# Patient Record
Sex: FEMALE | Race: WHITE | Employment: OTHER | URBAN - METROPOLITAN AREA
[De-identification: names, ages, dates, MRNs, and addresses within clinical notes are randomized per-mention and may not be internally consistent; named-entity substitution may affect disease eponyms.]

---

## 2017-04-27 ENCOUNTER — HOSPICE ADMISSION (OUTPATIENT)
Dept: HOSPICE | Facility: HOSPICE | Age: 82
End: 2017-04-27

## 2017-05-09 PROBLEM — E03.9 HYPOTHYROIDISM: Status: ACTIVE | Noted: 2017-05-09

## 2017-05-09 PROBLEM — F03.90 DEMENTIA WITHOUT BEHAVIORAL DISTURBANCE (HCC): Status: ACTIVE | Noted: 2017-05-09

## 2017-05-09 PROBLEM — G47.34 NOCTURNAL HYPOXIA: Status: ACTIVE | Noted: 2017-05-09

## 2017-05-09 PROBLEM — J44.9 COPD (CHRONIC OBSTRUCTIVE PULMONARY DISEASE) (HCC): Status: ACTIVE | Noted: 2017-05-09

## 2017-05-09 PROBLEM — F32.A DEPRESSION: Status: ACTIVE | Noted: 2017-05-09

## 2017-06-21 ENCOUNTER — HOSPICE ADMISSION (OUTPATIENT)
Dept: HOSPICE | Facility: HOSPICE | Age: 82
End: 2017-06-21
Payer: OTHER MISCELLANEOUS

## 2017-06-21 ENCOUNTER — HOSPICE ADMISSION (OUTPATIENT)
Dept: HOSPICE | Facility: HOSPICE | Age: 82
End: 2017-06-21

## 2017-06-29 ENCOUNTER — HOSPITAL ENCOUNTER (INPATIENT)
Age: 82
LOS: 5 days | Discharge: HOME | End: 2017-07-04
Attending: INTERNAL MEDICINE | Admitting: INTERNAL MEDICINE

## 2017-06-29 PROCEDURE — 3336500001 HSPC ELECTION

## 2017-06-29 PROCEDURE — 0655 HSPC INPATIENT RESPITE

## 2017-06-29 RX ORDER — TAMSULOSIN HYDROCHLORIDE 0.4 MG/1
0.4 CAPSULE ORAL DAILY
Status: DISCONTINUED | OUTPATIENT
Start: 2017-06-30 | End: 2017-07-04 | Stop reason: HOSPADM

## 2017-06-29 RX ORDER — DIPHENHYDRAMINE HCL 25 MG
25 CAPSULE ORAL
Status: DISCONTINUED | OUTPATIENT
Start: 2017-06-29 | End: 2017-06-29

## 2017-06-29 RX ORDER — CICLOPIROX OLAMINE 7.7 MG/G
1 CREAM TOPICAL 2 TIMES DAILY
Status: DISCONTINUED | OUTPATIENT
Start: 2017-06-29 | End: 2017-07-04 | Stop reason: HOSPADM

## 2017-06-29 RX ORDER — ACETAMINOPHEN 500 MG
500 TABLET ORAL
Status: ON HOLD | COMMUNITY
End: 2017-06-29

## 2017-06-29 RX ORDER — CICLOPIROX OLAMINE 7.7 MG/G
1 CREAM TOPICAL 2 TIMES DAILY
COMMUNITY

## 2017-06-29 RX ORDER — DOCUSATE SODIUM 100 MG/1
100 CAPSULE, LIQUID FILLED ORAL
Status: DISCONTINUED | OUTPATIENT
Start: 2017-06-29 | End: 2017-06-29

## 2017-06-29 RX ORDER — ACETAMINOPHEN 500 MG
500 TABLET ORAL
Status: DISCONTINUED | OUTPATIENT
Start: 2017-06-29 | End: 2017-06-29

## 2017-06-29 RX ORDER — FLUTICASONE PROPIONATE AND SALMETEROL 250; 50 UG/1; UG/1
1 POWDER RESPIRATORY (INHALATION) EVERY 12 HOURS
Status: DISCONTINUED | OUTPATIENT
Start: 2017-06-29 | End: 2017-07-04 | Stop reason: HOSPADM

## 2017-06-29 RX ORDER — ALBUTEROL SULFATE 90 UG/1
2 AEROSOL, METERED RESPIRATORY (INHALATION)
Status: DISCONTINUED | OUTPATIENT
Start: 2017-06-29 | End: 2017-07-04 | Stop reason: HOSPADM

## 2017-06-29 RX ORDER — DIPHENHYDRAMINE HCL 25 MG
25 CAPSULE ORAL
Status: DISCONTINUED | OUTPATIENT
Start: 2017-06-29 | End: 2017-07-04 | Stop reason: HOSPADM

## 2017-06-29 RX ORDER — DULOXETIN HYDROCHLORIDE 30 MG/1
30 CAPSULE, DELAYED RELEASE ORAL DAILY
Status: DISCONTINUED | OUTPATIENT
Start: 2017-06-30 | End: 2017-07-04 | Stop reason: HOSPADM

## 2017-06-29 RX ORDER — TAMSULOSIN HYDROCHLORIDE 0.4 MG/1
0.4 CAPSULE ORAL DAILY
COMMUNITY
End: 2017-08-23

## 2017-06-29 RX ORDER — MEMANTINE HYDROCHLORIDE 10 MG/1
10 TABLET ORAL 2 TIMES DAILY
Status: DISCONTINUED | OUTPATIENT
Start: 2017-06-29 | End: 2017-07-04 | Stop reason: HOSPADM

## 2017-06-29 RX ORDER — LORAZEPAM 0.5 MG/1
0.5 TABLET ORAL
COMMUNITY
End: 2017-07-31 | Stop reason: ALTCHOICE

## 2017-06-29 RX ORDER — DIPHENHYDRAMINE HCL 25 MG
25 CAPSULE ORAL
COMMUNITY
End: 2017-07-31 | Stop reason: ALTCHOICE

## 2017-06-29 RX ORDER — LORAZEPAM 0.5 MG/1
0.5 TABLET ORAL
Status: DISCONTINUED | OUTPATIENT
Start: 2017-06-29 | End: 2017-07-04 | Stop reason: HOSPADM

## 2017-06-29 RX ORDER — LORAZEPAM 0.5 MG/1
0.5 TABLET ORAL
Status: DISCONTINUED | OUTPATIENT
Start: 2017-06-29 | End: 2017-06-29

## 2017-06-29 RX ORDER — DOCUSATE SODIUM 100 MG/1
100 CAPSULE, LIQUID FILLED ORAL
Status: ON HOLD | COMMUNITY
End: 2017-06-29

## 2017-06-29 RX ORDER — ALBUTEROL SULFATE 90 UG/1
2 AEROSOL, METERED RESPIRATORY (INHALATION)
COMMUNITY

## 2017-06-29 RX ORDER — DIPHENHYDRAMINE HCL 25 MG
25 CAPSULE ORAL
Status: ON HOLD | COMMUNITY
End: 2017-06-29

## 2017-06-29 RX ORDER — PHENOL 1.4 %
1 AEROSOL, SPRAY (ML) MUCOUS MEMBRANE DAILY
Status: DISCONTINUED | OUTPATIENT
Start: 2017-06-30 | End: 2017-07-04 | Stop reason: HOSPADM

## 2017-06-29 RX ADMIN — FLUTICASONE PROPIONATE AND SALMETEROL 1 PUFF: 250; 50 POWDER RESPIRATORY (INHALATION) at 20:41

## 2017-06-29 RX ADMIN — MEMANTINE HYDROCHLORIDE 10 MG: 10 TABLET ORAL at 20:42

## 2017-06-29 RX ADMIN — CICLOPIROX OLAMINE 1 G: 7.7 CREAM TOPICAL at 20:41

## 2017-06-29 NOTE — IP AVS SNAPSHOT
Current Discharge Medication List  
  
CONTINUE these medications which have CHANGED Dose & Instructions Dispensing Information Comments Morning Noon Evening Bedtime OTHER(NON-FORMULARY) What changed:  Another medication with the same name was removed. Continue taking this medication, and follow the directions you see here. Your last dose was: Your next dose is:    
   
   
 Dose:  1 Tab Take 1 Tab by mouth every four (4) hours as needed (nasal congestion). Zicam rapid melts 1 tablet po q4 prn nasal congestion. Contracted Patient with [facility]. Using patient supply of home med. Refills:  0 CONTINUE these medications which have NOT CHANGED Dose & Instructions Dispensing Information Comments Morning Noon Evening Bedtime ADVAIR DISKUS 250-50 mcg/dose diskus inhaler Generic drug:  fluticasone-salmeterol Your last dose was: Your next dose is:    
   
   
 Dose:  1 Puff Take 1 Puff by inhalation every twelve (12) hours. Refills:  0  
     
   
   
   
  
 ATIVAN 0.5 mg tablet Generic drug:  LORazepam  
   
Your last dose was: Your next dose is:    
   
   
 Dose:  0.5 mg Take 0.5 mg by mouth every six (6) hours as needed for Anxiety. Refills:  0  
     
   
   
   
  
 BENADRYL 25 mg capsule Generic drug:  diphenhydrAMINE Your last dose was: Your next dose is:    
   
   
 Dose:  25 mg Take 25 mg by mouth nightly as needed for Sleep. Refills:  0  
     
   
   
   
  
 CHLORASEPTIC SORE THROAT 6-10 mg Lozg Generic drug:  Benzocaine-Menthol Your last dose was: Your next dose is:    
   
   
 Dose:  1-2 Lozenge 1-2 Lozenges by Mucous Membrane route every four (4) hours as needed for Other (sore throat). Contracted Patient with [facility]. Using patient supply of home med. Refills:  0 ciclopirox 0.77 % topical cream  
Commonly known as:  Radha Colder Your last dose was: Your next dose is:    
   
   
 Dose:  1 g Apply 1 g to affected area two (2) times a day. To affected area bid. Contracted Patient with [facility]. Using patient supply of home med. Refills:  0  
     
   
   
   
  
 CYMBALTA 30 mg capsule Generic drug:  DULoxetine Your last dose was: Your next dose is:    
   
   
 Dose:  30 mg Take 30 mg by mouth daily. Refills:  0  
     
   
   
   
  
 FLOMAX 0.4 mg capsule Generic drug:  tamsulosin Your last dose was: Your next dose is:    
   
   
 Dose:  0.4 mg Take 0.4 mg by mouth daily. Refills:  0 MEGARED OMEGA-3 KRILL OIL PO Your last dose was: Your next dose is:    
   
   
 Dose:  1 Cap Take 1 Cap by mouth daily. Refills:  0  
     
   
   
   
  
 NAMENDA 10 mg tablet Generic drug:  memantine Your last dose was: Your next dose is:    
   
   
 Dose:  10 mg Take 10 mg by mouth two (2) times a day. Refills:  0 phenol throat spray 1.4 % spray Commonly known as:  [place] Your last dose was: Your next dose is:    
   
   
 Dose:  2 Spray Take 2 Sprays by mouth every four (4) hours as needed for Sore throat. Contracted Patient with [facility]. Using patient supply of home med. Refills:  0 PROVENTIL HFA 90 mcg/actuation inhaler Generic drug:  albuterol Your last dose was: Your next dose is:    
   
   
 Dose:  2 Puff Take 2 Puffs by inhalation every four (4) hours as needed for Wheezing or Shortness of Breath. Refills:  0

## 2017-06-29 NOTE — IP AVS SNAPSHOT
Yoseph Bowman 
 
 
 [address] [facility] 
[id number] Patient: Wayne Stroud MRN: [id number] KJT:[id number] You are allergic to the following No active allergies Recent Documentation Smoking Status Former Smoker Emergency Contacts Name Discharge Info Relation Home Work Mobile Lauren Love  Daughter [21] [phone] About your hospitalization You were admitted on:  June 29, 2017 You last received care in the:  [address] [place] [address] You were discharged on:  July 4, 2017 Unit phone number:  [phone] Why you were hospitalized Your primary diagnosis was:  Copd (Chronic Obstructive Pulmonary Disease) ([facility]) Providers Seen During Your Hospitalizations Provider Role Specialty Primary office phone Leda Mcgraw MD Attending Provider Geriatric Medicine [phone] Your Primary Care Physician (PCP) Primary Care Physician Office Phone Office Fax Omid Dunncorrine [phone] Follow-up Information Follow up With Details Comments Contact Info Lane Gilford, [place]   [place] [address] 
[phone] Leda Mcgraw MD   [address] [address] 
[phone] Current Discharge Medication List  
  
CONTINUE these medications which have CHANGED Dose & Instructions Dispensing Information Comments Morning Noon Evening Bedtime OTHER(NON-FORMULARY) What changed:  Another medication with the same name was removed. Continue taking this medication, and follow the directions you see here. Your last dose was: Your next dose is:    
   
   
 Dose:  1 Tab Take 1 Tab by mouth every four (4) hours as needed (nasal congestion). Zicam rapid melts 1 tablet po q4 prn nasal congestion.  Contracted Patient with [facility]. Using patient supply of home med. Refills:  0 CONTINUE these medications which have NOT CHANGED Dose & Instructions Dispensing Information Comments Morning Noon Evening Bedtime ADVAIR DISKUS 250-50 mcg/dose diskus inhaler Generic drug:  fluticasone-salmeterol Your last dose was: Your next dose is:    
   
   
 Dose:  1 Puff Take 1 Puff by inhalation every twelve (12) hours. Refills:  0  
     
   
   
   
  
 ATIVAN 0.5 mg tablet Generic drug:  LORazepam  
   
Your last dose was: Your next dose is:    
   
   
 Dose:  0.5 mg Take 0.5 mg by mouth every six (6) hours as needed for Anxiety. Refills:  0  
     
   
   
   
  
 BENADRYL 25 mg capsule Generic drug:  diphenhydrAMINE Your last dose was: Your next dose is:    
   
   
 Dose:  25 mg Take 25 mg by mouth nightly as needed for Sleep. Refills:  0  
     
   
   
   
  
 CHLORASEPTIC SORE THROAT 6-10 mg Lozg Generic drug:  Benzocaine-Menthol Your last dose was: Your next dose is:    
   
   
 Dose:  1-2 Lozenge 1-2 Lozenges by Mucous Membrane route every four (4) hours as needed for Other (sore throat). Contracted Patient with [facility]. Using patient supply of home med. Refills:  0  
     
   
   
   
  
 ciclopirox 0.77 % topical cream  
Commonly known as:  Areli Erika Your last dose was: Your next dose is:    
   
   
 Dose:  1 g Apply 1 g to affected area two (2) times a day. To affected area bid. Contracted Patient with [facility]. Using patient supply of home med. Refills:  0  
     
   
   
   
  
 CYMBALTA 30 mg capsule Generic drug:  DULoxetine Your last dose was: Your next dose is:    
   
   
 Dose:  30 mg Take 30 mg by mouth daily. Refills:  0  
     
   
   
   
  
 FLOMAX 0.4 mg capsule Generic drug:  tamsulosin Your last dose was: Your next dose is:    
   
   
 Dose:  0.4 mg Take 0.4 mg by mouth daily. Refills:  0 MEGARED OMEGA-3 KRILL OIL PO Your last dose was: Your next dose is:    
   
   
 Dose:  1 Cap Take 1 Cap by mouth daily. Refills:  0  
     
   
   
   
  
 NAMENDA 10 mg tablet Generic drug:  memantine Your last dose was: Your next dose is:    
   
   
 Dose:  10 mg Take 10 mg by mouth two (2) times a day. Refills:  0 phenol throat spray 1.4 % spray Commonly known as:  Roxianbhargav Goodmanles Your last dose was: Your next dose is:    
   
   
 Dose:  2 Spray Take 2 Sprays by mouth every four (4) hours as needed for Sore throat. Contracted Patient with [facility]. Using patient supply of home med. Refills:  0 PROVENTIL HFA 90 mcg/actuation inhaler Generic drug:  albuterol Your last dose was: Your next dose is:    
   
   
 Dose:  2 Puff Take 2 Puffs by inhalation every four (4) hours as needed for Wheezing or Shortness of Breath. Refills:  0 Discharge Instructions SEE DC SUMMARY. Rip Robbins NP Nurse Practitioner Cosign Needed Nurse Practitioner Discharge Summaries Date of Service: 07/03/17 1526 Hide copied text 
 
  
Discharge Summary  
  
Patient: Brenda Henderson MRN: [id number]  SSN: [id number] YOB: 1933  Age: 80 y.o. Sex: female   
   
Admit Date: 6/29/2017 
  
Discharge Date: 7/4/17   
  
Admission Diagnoses: Respite COPD (chronic obstructive pulmonary disease) ([facility] [address] COPD (chronic obstructive pulmonary disease) ([facility] [address] COPD (chronic obstructive pulmonary disease) ([facility] [address] COPD (chronic obstructive pulmonary disease) [facility] 
  
Discharge Diagnoses:  
Problem List as of 7/3/2017  Date Reviewed: 7/3/2017  
          Codes Class Noted - Resolved   * (Principal)COPD (chronic obstructive pulmonary disease) ([facility] [address] ICD-10-CM: J44.9 ICD-9-CM: 054   5/9/2017 - Present  
     
  Depression ICD-10-CM: F32.9 ICD-9-CM: 758   5/9/2017 - Present  
     
  Hypothyroidism ICD-10-CM: E03.9 ICD-9-CM: 303. 9   5/9/2017 - Present  
     
  Nocturnal hypoxia ICD-10-CM: G47.34 
ICD-9-CM: 327.24   5/9/2017 - Present  
     
  Dementia without behavioral disturbance ICD-10-CM: F03.90 ICD-9-CM: 294.20   5/9/2017 - Present  
     
  
  
  
Discharge Condition: Stable 
  
Code Status:DNR 
  
Hospital Course: Discharge from respite stay at [facility] to home with [facility]. DC 7/4/17 at  via 11 AM by family car. Status at time of discharge is routine. 
  
Consults: None 
  
Significant Diagnostic Studies: None 
  
Disposition: home 
  
Discharge Medications:  
   
Current Discharge Medication List  
   
   
CONTINUE these medications which have NOT CHANGED  
  Details LORazepam (ATIVAN) 0.5 mg tablet Take 0.5 mg by mouth every six (6) hours as needed for Anxiety.  
   
albuterol (PROVENTIL HFA) 90 mcg/actuation inhaler Take 2 Puffs by inhalation every four (4) hours as needed for Wheezing or Shortness of Breath.  
   
tamsulosin (FLOMAX) 0.4 mg capsule Take 0.4 mg by mouth daily.  
   
OTHER,NON-FORMULARY, Take 1 Tab by mouth every four (4) hours as needed (nasal congestion). Zicam rapid melts 1 tablet po q4 prn nasal congestion. Contracted Patient with [facility]. Using patient supply of home med.  
   
KRILL/OM-3/DHA/EPA/PHOSPHO/AST (MEGARED OMEGA-3 KRILL OIL PO) Take 1 Cap by mouth daily.  
   
phenol throat spray (CHLORASEPTIC) 1.4 % spray Take 2 Sprays by mouth every four (4) hours as needed for Sore throat. Contracted Patient with [facility].  Using patient supply of home med.  
   
Benzocaine-Menthol (CHLORASEPTIC SORE THROAT) 6-10 mg lozg 1-2 Lozenges by Mucous Membrane route every four (4) hours as needed for Other (sore throat). Contracted Patient with [facility]. Using patient supply of home med.  
   
ciclopirox (LOPROX) 0.77 % topical cream Apply 1 g to affected area two (2) times a day. To affected area bid. Contracted Patient with [facility]. Using patient supply of home med.  
   
diphenhydrAMINE (BENADRYL) 25 mg capsule Take 25 mg by mouth nightly as needed for Sleep.  
   
DULoxetine (CYMBALTA) 30 mg capsule Take 30 mg by mouth daily.  
   
memantine (NAMENDA) 10 mg tablet Take 10 mg by mouth two (2) times a day.  
   
fluticasone-salmeterol (ADVAIR DISKUS) 250-50 mcg/dose diskus inhaler Take 1 Puff by inhalation every twelve (12) hours.  
   
   
  
  
Activity: Activity as tolerated with assist. 
Diet: Regular Diet Wound Care: None needed Oxygen: 2 L/min via NC PRN SOB. Equipment: As previously established by primary hospice. Meds: Patient to DC home tomorrow with [facility]. Meds  From home to be returned to patient on DC. No new meds ordered and no prescriptions provided.  
  
     
Follow-up Appointments Procedures  FOLLOW UP VISIT Appointment in: Other (Specify) Patient to DC home tomorrow with [facility]. Meds  From home to be returned to pat on DC. No new meds ordered and no prescriptions provided.  
    Patient to DC home tomorrow with [facility]. Meds  From home to be returned to pat on DC. No new meds ordered and no prescriptions provided.  
    Standing Status:   Standing  
    Number of Occurrences:   1  
    Order Specific Question:   Appointment in  
    Answer: Other (Specify)  
  
  
Signed By: Dougie Paul NP   
  July 3, 2017   
  
   
   
  
     
   
 
 
 
Discharge Orders None MyChart Announcement  We are excited to announce that we are making your provider's discharge notes available to you in Vsnap. You will see these notes when they are completed and signed by the physician that discharged you from your recent hospital stay. If you have any questions or concerns about any information you see in Vsnap, please call the Health Information Department where you were seen or reach out to your Primary Care Provider for more information about your plan of care. Introducing [facility] SERVICES! Maria G Patel introduces Vsnap patient portal. Now you can access parts of your medical record, email your doctor's office, and request medication refills online. 1. In your internet browser, go to [email or web address] 2. Click on the First Time User? Click Here link in the Sign In box. You will see the New Member Sign Up page. 3. Enter your Vsnap Access Code exactly as it appears below. You will not need to use this code after youve completed the sign-up process. If you do not sign up before the expiration date, you must request a new code. · Vsnap Access Code: [email or web address] Expires: 7/26/2017  2:55 PM 
 
4. Enter the last four digits of your Social Security Number (xxxx) and Date of Birth (mm/dd/yyyy) as indicated and click Submit. You will be taken to the next sign-up page. 5. Create a Vsnap ID. This will be your Vsnap login ID and cannot be changed, so think of one that is secure and easy to remember. 6. Create a Vsnap password. You can change your password at any time. 7. Enter your Password Reset Question and Answer. This can be used at a later time if you forget your password. 8. Enter your e-mail address. You will receive e-mail notification when new information is available in [address] 9. Click Sign Up. You can now view and download portions of your medical record. 10. Click the Download Summary menu link to download a portable copy of your medical information. If you have questions, please visit the Frequently Asked Questions section of the MyChart website. Remember, MyChart is NOT to be used for urgent needs. For medical emergencies, dial 911. Now available from your iPhone and Android! General Information Please provide this summary of care documentation to your next provider. Patient Signature:  ____________________________________________________________ Date:  ____________________________________________________________  
  
Larri Hazard Provider Signature:  ____________________________________________________________ Date:  ____________________________________________________________

## 2017-06-29 NOTE — PROGRESS NOTES
Presents via private vehicle from her daughter's home with her daughter for respite admission. DX: COPD. She presents alert, oriented x 3. She anticipates and assists in assessment, sofya, ESAs and fall risk. Her daughter Kathalene Olszewski provides information related to PMH and medications. Swapna Pearce RN from [facility] is present to reconcile meds. Pt denies pain, nausea, SOB. She is cheerful, interactive, pleasant. She is escorted through the house in her wheelchair by her daughter for assisted tour with a volunteer. All deny questions, concerns. 1530- Snack provided. She ate 100% of her late lunch. She denies pain, SOB, nausea. 1653- eating dinner. She is alert, cheerful and interactive. She is oriented to the building and the location of her room. Her gait is steady. She denies discomfort. I verify that a minimum of hourly rounds have been provided for this patient during this shift. Meds: Admission meds reconciled    Family/Education: Pt and daughter oriented to the building, to safety issues, call bell etc.      Oral intake: Ate 100% both meals    New problems/issues: Non    Summary/general care: Cooperative, oriented respite admission. Meds reconciled with [facility] nurse, issues addressed. No symptoms for management at this time.

## 2017-06-30 PROCEDURE — 0655 HSPC INPATIENT RESPITE

## 2017-06-30 RX ADMIN — CICLOPIROX OLAMINE 1 G: 7.7 CREAM TOPICAL at 21:00

## 2017-06-30 RX ADMIN — MEMANTINE HYDROCHLORIDE 10 MG: 10 TABLET ORAL at 21:00

## 2017-06-30 RX ADMIN — FLUTICASONE PROPIONATE AND SALMETEROL 1 PUFF: 250; 50 POWDER RESPIRATORY (INHALATION) at 08:10

## 2017-06-30 RX ADMIN — DULOXETIN HYDROCHLORIDE 30 MG: 30 CAPSULE, DELAYED RELEASE ORAL at 08:10

## 2017-06-30 RX ADMIN — Medication 1 CAPSULE: at 08:10

## 2017-06-30 RX ADMIN — FLUTICASONE PROPIONATE AND SALMETEROL 1 PUFF: 250; 50 POWDER RESPIRATORY (INHALATION) at 21:00

## 2017-06-30 RX ADMIN — TAMSULOSIN HYDROCHLORIDE 0.4 MG: 0.4 CAPSULE ORAL at 08:10

## 2017-06-30 RX ADMIN — CICLOPIROX OLAMINE 1 G: 7.7 CREAM TOPICAL at 08:10

## 2017-06-30 RX ADMIN — MEMANTINE HYDROCHLORIDE 10 MG: 10 TABLET ORAL at 08:10

## 2017-06-30 NOTE — PROGRESS NOTES
ID, bedside report. She wakes, denies pain or discomfort. She requests to sleep longer and states she doesn't want breakfast yet. 830- awakens. She requests breakfast and her medications. She is oriented, pleasant and cooperative. Up with minimal assist, supervision mostly. Oral care, morning care by her own hand. 1100- walking about with volunteer. Steady, cheerful. She denies distress. 1400- Watching Tv, having a snack. She denies discomfort. Unaccompanied. 1730- Up in the room unattended. Steady on her feet. She denies needs, distress. She is cheerful,.

## 2017-06-30 NOTE — HSP[facility] IDG VOLUNTEER NOTES
[address] Review     Status Codes I = Initiated C=Continued R=Revised RS = Resolved     I.  Volunteer     Goal: Hospice house volunteer (s) enhances the quality of remaining life while patient is at the hospice house. Interventions: Tejas Cobb Volunteer (s) will provide companionship to the patient and/or family by visiting at the hospice house       . Tejas Cobb Volunteer (s) will provide respite as needed when requested by patient and/or family. Tejas Trinidad Brochure  Volunteer will provide activities such as music, reading, pet therapy, etc. as requested. Tejas Trinidad Brochure  Comfort bag delivered. Any other special requests or information regarding volunteer services:    Staff have asked for extra companionship visits and volunteers have been notified. No further needs identified at this time. These notes have been discussed in [facility] meeting.           Signed by: Twan Chan

## 2017-06-30 NOTE — ADVANCED PRACTICE NURSE
Confused. Roaming in hallway all the way to the cafe yesterday. Eating well. Plans for discharge on 7/4. Case discussed with Dr. Chad Land and in [facility] meeting today. No changes in plan of care.

## 2017-06-30 NOTE — HSPC IDG NURSE NOTES
Patient: Brenda Henderson    Date: 06/30/17  Time: 12:29 PM    [facility] Nurse Notes    UPDATE: Patient is a 80year old Female patient, on [address] [place], from [facility]. O2 prn for dyspnea management. Good appetite reported. Ativan for agitation. Discharge plan for 7/4/2017. Skin reported to be intact with itching reported at tops of feet. GOALS OF CARE:   Continue to monitor for optimal patient comfort and symptom management. Discharge plan for 7/4/2017.             Signed by: Fadi Guevara RN MSN

## 2017-06-30 NOTE — PROGRESS NOTES
Report received from day shift RN. Pt identified by name and . Pt walking from her bathroom to her room at this time. Denies pain or discomfort. Resp. Even and unlabored. Pt reminded to call for assist as needed. Call bell in reach.

## 2017-06-30 NOTE — HSPC IDG SOCIAL WORKER NOTES
Patient: Jorje Scanlon    Date: 06/30/17  Time: 12:28 PM    [facility]  Notes  Pt is a respite for [facility]. LMSW will differ to the SW for the psychosocial needs.  DC on 7/4/17        Signed by: Bolivar Knight

## 2017-07-01 PROCEDURE — 0655 HSPC INPATIENT RESPITE

## 2017-07-01 RX ADMIN — FLUTICASONE PROPIONATE AND SALMETEROL 1 PUFF: 250; 50 POWDER RESPIRATORY (INHALATION) at 08:06

## 2017-07-01 RX ADMIN — FLUTICASONE PROPIONATE AND SALMETEROL 1 PUFF: 250; 50 POWDER RESPIRATORY (INHALATION) at 21:18

## 2017-07-01 RX ADMIN — TAMSULOSIN HYDROCHLORIDE 0.4 MG: 0.4 CAPSULE ORAL at 08:07

## 2017-07-01 RX ADMIN — CICLOPIROX OLAMINE 1 G: 7.7 CREAM TOPICAL at 08:05

## 2017-07-01 RX ADMIN — MEMANTINE HYDROCHLORIDE 10 MG: 10 TABLET ORAL at 21:19

## 2017-07-01 RX ADMIN — CICLOPIROX OLAMINE 1 G: 7.7 CREAM TOPICAL at 21:17

## 2017-07-01 RX ADMIN — DULOXETIN HYDROCHLORIDE 30 MG: 30 CAPSULE, DELAYED RELEASE ORAL at 08:06

## 2017-07-01 RX ADMIN — Medication 1 CAPSULE: at 08:07

## 2017-07-01 RX ADMIN — MEMANTINE HYDROCHLORIDE 10 MG: 10 TABLET ORAL at 08:07

## 2017-07-01 NOTE — PROGRESS NOTES
Patient assessment complotted as documented. Patient denies needs at present. Noted pt does complain of throat pain. States difficult to swallow due to pain. Noted small white patches in back of throat. No fever, nausea or shortness of breath/ Will notify NP/ physician re complaint. No other abnormalities noted a t present. Instructed to call for needs.

## 2017-07-02 PROCEDURE — 0655 HSPC INPATIENT RESPITE

## 2017-07-02 RX ADMIN — MEMANTINE HYDROCHLORIDE 10 MG: 10 TABLET ORAL at 21:00

## 2017-07-02 RX ADMIN — Medication 1 CAPSULE: at 09:00

## 2017-07-02 RX ADMIN — DULOXETIN HYDROCHLORIDE 30 MG: 30 CAPSULE, DELAYED RELEASE ORAL at 09:00

## 2017-07-02 RX ADMIN — CICLOPIROX OLAMINE 1 G: 7.7 CREAM TOPICAL at 09:00

## 2017-07-02 RX ADMIN — FLUTICASONE PROPIONATE AND SALMETEROL 1 PUFF: 250; 50 POWDER RESPIRATORY (INHALATION) at 09:00

## 2017-07-02 RX ADMIN — FLUTICASONE PROPIONATE AND SALMETEROL 1 PUFF: 250; 50 POWDER RESPIRATORY (INHALATION) at 21:00

## 2017-07-02 RX ADMIN — TAMSULOSIN HYDROCHLORIDE 0.4 MG: 0.4 CAPSULE ORAL at 09:00

## 2017-07-02 RX ADMIN — CICLOPIROX OLAMINE 1 G: 7.7 CREAM TOPICAL at 21:00

## 2017-07-02 RX ADMIN — MEMANTINE HYDROCHLORIDE 10 MG: 10 TABLET ORAL at 09:00

## 2017-07-02 NOTE — HSP[facility] IDG CHAPLAIN NOTES
Patient: Asha Perkins    Date: 07/02/17  Time: 4:42 PM    [facility]  Notes  Respite Patient. Spiritual support to be provided as needed, requested or referred.         Signed by: Shital Law

## 2017-07-02 NOTE — PROGRESS NOTES
Awakened for HS meds. Still drowsy. Denies needs. No trouble swallowing. Denies needs. All safety measures continue. Bed in low and locked position with side rails up x2 and call light in reach. Door left open as pt is unaccompanied.

## 2017-07-02 NOTE — PROGRESS NOTES
Pt identified by name/. Walking rounds done with off going RN. Pt sleeping. Respirations easy and unlabored. Facial features flat,relaxed. Bed in low and locked position with side rails up x 2 and call light in reach. Door left open as pt is unaccompanied.

## 2017-07-03 VITALS
TEMPERATURE: 99.7 F | RESPIRATION RATE: 14 BRPM | SYSTOLIC BLOOD PRESSURE: 149 MMHG | DIASTOLIC BLOOD PRESSURE: 78 MMHG | HEART RATE: 95 BPM

## 2017-07-03 PROCEDURE — 0655 HSPC INPATIENT RESPITE

## 2017-07-03 RX ADMIN — CICLOPIROX OLAMINE 1 G: 7.7 CREAM TOPICAL at 09:05

## 2017-07-03 RX ADMIN — FLUTICASONE PROPIONATE AND SALMETEROL 1 PUFF: 250; 50 POWDER RESPIRATORY (INHALATION) at 09:04

## 2017-07-03 RX ADMIN — CICLOPIROX OLAMINE 1 G: 7.7 CREAM TOPICAL at 21:00

## 2017-07-03 RX ADMIN — FLUTICASONE PROPIONATE AND SALMETEROL 1 PUFF: 250; 50 POWDER RESPIRATORY (INHALATION) at 21:00

## 2017-07-03 RX ADMIN — Medication 1 CAPSULE: at 09:04

## 2017-07-03 RX ADMIN — TAMSULOSIN HYDROCHLORIDE 0.4 MG: 0.4 CAPSULE ORAL at 09:05

## 2017-07-03 RX ADMIN — DULOXETIN HYDROCHLORIDE 30 MG: 30 CAPSULE, DELAYED RELEASE ORAL at 09:04

## 2017-07-03 RX ADMIN — MEMANTINE HYDROCHLORIDE 10 MG: 10 TABLET ORAL at 21:00

## 2017-07-03 RX ADMIN — MEMANTINE HYDROCHLORIDE 10 MG: 10 TABLET ORAL at 09:04

## 2017-07-03 NOTE — DISCHARGE SUMMARY
Discharge Summary     Patient: Shane Bullock MRN: [id number]  SSN: [id number]    YOB: 1933  Age: 80 y.o. Sex: female       Admit Date: 6/29/2017    Discharge Date: 7/4/17      Admission Diagnoses: Respite  COPD (chronic obstructive pulmonary disease) (HCC)  COPD (chronic obstructive pulmonary disease) (HCC)  COPD (chronic obstructive pulmonary disease) (HCC)  COPD (chronic obstructive pulmonary disease) ([facility].)    Discharge Diagnoses:   Problem List as of 7/3/2017  Date Reviewed: 7/3/2017          Codes Class Noted - Resolved    * (Principal)COPD (chronic obstructive pulmonary disease) ([facility].) ICD-10-CM: J44.9  ICD-9-CM: 496  5/9/2017 - Present        Depression ICD-10-CM: F32.9  ICD-9-CM: 311  5/9/2017 - Present        Hypothyroidism ICD-10-CM: E03.9  ICD-9-CM: 244.9  5/9/2017 - Present        Nocturnal hypoxia ICD-10-CM: G47.34  ICD-9-CM: 327.24  5/9/2017 - Present        Dementia without behavioral disturbance ICD-10-CM: F03.90  ICD-9-CM: 294.20  5/9/2017 - Present               Discharge Condition: Stable    Code Status:DNR    Hospital Course: Discharge from respite stay at [facility] to home with [facility]. [place] 7/4/17 at  via 11 AM by family car. Status at time of discharge is routine. Consults: None    Significant Diagnostic Studies: None    Disposition: home    Discharge Medications:   Current Discharge Medication List      CONTINUE these medications which have NOT CHANGED    Details   LORazepam (ATIVAN) 0.5 mg tablet Take 0.5 mg by mouth every six (6) hours as needed for Anxiety. albuterol (PROVENTIL HFA) 90 mcg/actuation inhaler Take 2 Puffs by inhalation every four (4) hours as needed for Wheezing or Shortness of Breath. tamsulosin (FLOMAX) 0.4 mg capsule Take 0.4 mg by mouth daily. OTHER,NON-FORMULARY, Take 1 Tab by mouth every four (4) hours as needed (nasal congestion). Zicam rapid melts 1 tablet po q4 prn nasal congestion.   Contracted Patient with [facility]. Using patient supply of home med. KRILL/OM-3/DHA/EPA/PHOSPHO/AST (MEGARED OMEGA-3 KRILL OIL PO) Take 1 Cap by mouth daily. phenol throat spray (CHLORASEPTIC) 1.4 % spray Take 2 Sprays by mouth every four (4) hours as needed for Sore throat. Contracted Patient with [facility]. Using patient supply of home med. Benzocaine-Menthol (CHLORASEPTIC SORE THROAT) 6-10 mg lozg 1-2 Lozenges by Mucous Membrane route every four (4) hours as needed for Other (sore throat). Contracted Patient with [facility]. Using patient supply of home med. ciclopirox (LOPROX) 0.77 % topical cream Apply 1 g to affected area two (2) times a day. To affected area bid. Contracted Patient with [facility]. Using patient supply of home med. diphenhydrAMINE (BENADRYL) 25 mg capsule Take 25 mg by mouth nightly as needed for Sleep. DULoxetine (CYMBALTA) 30 mg capsule Take 30 mg by mouth daily. memantine (NAMENDA) 10 mg tablet Take 10 mg by mouth two (2) times a day. fluticasone-salmeterol (ADVAIR DISKUS) 250-50 mcg/dose diskus inhaler Take 1 Puff by inhalation every twelve (12) hours. Activity: Activity as tolerated with assist.  Diet: Regular Diet  Wound Care: None needed  Oxygen: 2 L/min via NC PRN SOB. Equipment: As previously established by primary hospice. Meds: Patient to DC home tomorrow with [facility]. Meds  From home to be returned to patient on DC. No new meds ordered and no prescriptions provided. Follow-up Appointments   Procedures    FOLLOW UP VISIT Appointment in: Other (Specify) Patient to DC home tomorrow with [facility]. Meds  From home to be returned to pat on DC. No new meds ordered and no prescriptions provided. Patient to DC home tomorrow with [facility]. Meds  From home to be returned to pat on DC.  No new meds ordered and no prescriptions provided. Standing Status:   Standing     Number of Occurrences:   1     Order Specific Question:   Appointment in     Answer:    Other (Specify)       Signed By: Marc Del Cid NP     July 3, 2017

## 2017-07-03 NOTE — PROGRESS NOTES
ID, bedside report. She is asleep. FLACc 0.  RR 16,e asy. Unaccompanied. 1000- Asleep. Skin warm, dry. FLACC 0.  RR 16, easy. Awakens easily for medications. 1055- home aide in for shower. Pt assists, is independent in many activities and works with CNA to complete care. She denies pain, sOB, nausea. She declines food/snack at this time. 1330- ambulatory in the room , gait steady, unassisted. She is alert, but somewhat angry. She cannot tell me why, she states \"I'm just tired of being here\". She is reassured she is going home tomorrow, but remains corky and somewhat evasive of conversation. She denies pain, SOB,e tc.      1600- skin warm, dry. She is restful. Unaccompanied. She denies discomfort. \  1750- Restful. Sitting on the edge of the bed eating. Unaccompanied. She denies discomfort. Stacey Barr

## 2017-07-03 NOTE — ROUTINE PROCESS
Pt sleeping. Calm. No distress. No facial grimace. Flacc =0-1. Resp non labored on RA. SR up x2. Bed low/locked. Call light with in reach. Door opened.

## 2017-07-03 NOTE — ROUTINE PROCESS
Pt I'd by name and . Pt calm. Lying in bed. No distress. No facial grimace. Flacc =0-1. Denies pain at this time. Resp non labored on RA. Lungs diminished. BS diminished. HR reg. No edema noted at this time. SR up x2. Bed low/locked. Call light with in reach. Door opened.

## 2017-07-03 NOTE — ROUTINE PROCESS
Pt arouses easily. Denies pain at this time. Flacc =0-1. Resp n on labored on RA. Routine med given po. Inhaler used. Cream applied to left leg. SR up x2. Bed low/locked. Call light with in reach. Door opened.

## 2017-07-04 RX ADMIN — FLUTICASONE PROPIONATE AND SALMETEROL 1 PUFF: 250; 50 POWDER RESPIRATORY (INHALATION) at 09:30

## 2017-07-04 RX ADMIN — DULOXETIN HYDROCHLORIDE 30 MG: 30 CAPSULE, DELAYED RELEASE ORAL at 09:29

## 2017-07-04 RX ADMIN — TAMSULOSIN HYDROCHLORIDE 0.4 MG: 0.4 CAPSULE ORAL at 09:31

## 2017-07-04 RX ADMIN — Medication 1 CAPSULE: at 09:27

## 2017-07-04 RX ADMIN — CICLOPIROX OLAMINE 1 G: 7.7 CREAM TOPICAL at 09:29

## 2017-07-04 RX ADMIN — MEMANTINE HYDROCHLORIDE 10 MG: 10 TABLET ORAL at 09:31

## 2017-07-04 NOTE — PROGRESS NOTES
Gave oral report to nurse Ruben Mayo from [facility]. Explained pt's daughter's concerns and gave a brief report. States she will speak with Lauren.

## 2017-07-04 NOTE — PROGRESS NOTES
Pt daughter at bedside, she addressed some concerns she had. She asked if pt had had pulse ox three times a day and if pt had had a strep test done. She said her daughter had talked to a nurse that had talked about getting the rapid strep done. When she was told those had not been done she expressed annoyance. Discharge instructions completed and daughter, Garfield Ye, given written discharge instructions. Assisted pt out to private car via wheelchair and safety transferred to car.

## 2017-07-04 NOTE — DISCHARGE INSTRUCTIONS
SEE DC SUMMARY. Stevie Montoya NP Nurse Practitioner Cosign Needed Nurse Practitioner Discharge Summaries Date of Service: 07/03/17 1548         []Hide copied text       Discharge Summary      Patient: Hernan Nowak MRN: [id number]  SSN: [id number]    YOB: 1933  Age: 80 y.o. Sex: female        Admit Date: 6/29/2017     Discharge Date: 7/4/17       Admission Diagnoses: Respite  COPD (chronic obstructive pulmonary disease) (HCC)  COPD (chronic obstructive pulmonary disease) (HCC)  COPD (chronic obstructive pulmonary disease) (HCC)  COPD (chronic obstructive pulmonary disease) ([facility].)     Discharge Diagnoses:   Problem List as of 7/3/2017  Date Reviewed: 7/3/2017             Codes Class Noted - Resolved     * (Principal)COPD (chronic obstructive pulmonary disease) ([facility].) ICD-10-CM: J44.9  ICD-9-CM: 80   5/9/2017 - Present           Depression ICD-10-CM: F32.9  ICD-9-CM: 311   5/9/2017 - Present           Hypothyroidism ICD-10-CM: E03.9  ICD-9-CM: 244. 9   5/9/2017 - Present           Nocturnal hypoxia ICD-10-CM: G47.34  ICD-9-CM: 327.24   5/9/2017 - Present           Dementia without behavioral disturbance ICD-10-CM: F03.90  ICD-9-CM: 294.20   5/9/2017 - Present                  Discharge Condition: Stable     Code Status:DNR     Hospital Course: Discharge from respite stay at [facility] to home with [facility]. DC 7/4/17 at  via 11 AM by family car.  Status at time of discharge is routine.     Consults: None     Significant Diagnostic Studies: None     Disposition: home     Discharge Medications:       Current Discharge Medication List           CONTINUE these medications which have NOT CHANGED     Details   LORazepam (ATIVAN) 0.5 mg tablet Take 0.5 mg by mouth every six (6) hours as needed for Anxiety.       albuterol (PROVENTIL HFA) 90 mcg/actuation inhaler Take 2 Puffs by inhalation every four (4) hours as needed for Wheezing or Shortness of Breath.       tamsulosin (FLOMAX) 0.4 mg capsule Take 0.4 mg by mouth daily.       OTHER,NON-FORMULARY, Take 1 Tab by mouth every four (4) hours as needed (nasal congestion). Zicam rapid melts 1 tablet po q4 prn nasal congestion. Contracted Patient with [facility]. Using patient supply of home med.       KRILL/OM-3/DHA/EPA/PHOSPHO/AST (MEGARED OMEGA-3 KRILL OIL PO) Take 1 Cap by mouth daily.       phenol throat spray (CHLORASEPTIC) 1.4 % spray Take 2 Sprays by mouth every four (4) hours as needed for Sore throat. Contracted Patient with [facility]. Using patient supply of home med.       Benzocaine-Menthol (CHLORASEPTIC SORE THROAT) 6-10 mg lozg 1-2 Lozenges by Mucous Membrane route every four (4) hours as needed for Other (sore throat). Contracted Patient with [facility]. Using patient supply of home med.       ciclopirox (LOPROX) 0.77 % topical cream Apply 1 g to affected area two (2) times a day. To affected area bid. Contracted Patient with [facility]. Using patient supply of home med.       diphenhydrAMINE (BENADRYL) 25 mg capsule Take 25 mg by mouth nightly as needed for Sleep.       DULoxetine (CYMBALTA) 30 mg capsule Take 30 mg by mouth daily.       memantine (NAMENDA) 10 mg tablet Take 10 mg by mouth two (2) times a day.       fluticasone-salmeterol (ADVAIR DISKUS) 250-50 mcg/dose diskus inhaler Take 1 Puff by inhalation every twelve (12) hours.                 Activity: Activity as tolerated with assist.  Diet: Regular Diet  Wound Care: None needed  Oxygen: 2 L/min via [place] PRN SOB. Equipment: As previously established by primary hospice. Meds: Patient to IN home tomorrow with [facility]. Meds  From home to be returned to patient on IN. No new meds ordered and no prescriptions provided.            Follow-up Appointments   Procedures    FOLLOW UP VISIT Appointment in: Other (Specify) Patient to IN home tomorrow with [facility]. Meds  From home to be returned to pat on DC. No new meds ordered and no prescriptions provided.       Patient to DC home tomorrow with [facility]. Meds  From home to be returned to pat on DC. No new meds ordered and no prescriptions provided.       Standing Status:   Standing       Number of Occurrences:   1       Order Specific Question:   Appointment in       Answer:    Other (Specify)         Signed By: Lizbeth Glasgow NP      July 3, 2017

## 2017-07-04 NOTE — ROUTINE PROCESS
Pt awake. Calm. No distress. No agitation. Lying in bed. No facial grimace. Flacc =0-1. Pt denies pain at this time. Resp non labored on RA. SR up x2. Bed low/locked. Call light with in reach. Door opened.

## 2017-07-04 NOTE — PROGRESS NOTES
Administered pt oral medication, pt able swallow without difficulty. Physical assessment completed. No complaints from pt.

## 2017-07-04 NOTE — ROUTINE PROCESS
Pt awake. Calm. No distress. Denies pain at this time. Resp non labored on RA. Routine med given po. Inhaler given. Cream to foot. SR up x2. Bed low/locked. Call light with in reach. Door opened.

## 2017-07-31 PROBLEM — B35.1 ONYCHOMYCOSIS DUE TO DERMATOPHYTE: Status: ACTIVE | Noted: 2017-07-31

## 2017-11-28 PROBLEM — R30.0 DYSURIA: Status: ACTIVE | Noted: 2017-11-28
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
## 2017-11-28 PROBLEM — K57.20 DIVERTICULITIS OF LARGE INTESTINE WITH ABSCESS WITHOUT BLEEDING: Status: ACTIVE | Noted: 2017-11-28

## 2017-11-28 PROBLEM — R41.82 ALTERED MENTAL STATUS, UNSPECIFIED: Status: ACTIVE | Noted: 2017-11-28

## 2017-11-29 ENCOUNTER — HOSPITAL ENCOUNTER (OUTPATIENT)
Dept: CT IMAGING | Age: 82
Discharge: HOME | DRG: 392 | End: 2017-11-29
Attending: FAMILY MEDICINE
Payer: MEDICARE

## 2017-11-29 ENCOUNTER — HOSPITAL ENCOUNTER (INPATIENT)
Age: 82
LOS: 14 days | Discharge: HOME | DRG: 392 | End: 2017-12-13
Attending: EMERGENCY MEDICINE | Admitting: INTERNAL MEDICINE
Payer: MEDICARE

## 2017-11-29 ENCOUNTER — APPOINTMENT (OUTPATIENT)
Dept: GENERAL RADIOLOGY | Age: 82
DRG: 392 | End: 2017-11-29
Attending: INTERNAL MEDICINE
Payer: MEDICARE

## 2017-11-29 DIAGNOSIS — K57.20 DIVERTICULITIS OF LARGE INTESTINE WITH ABSCESS, UNSPECIFIED BLEEDING STATUS: Primary | ICD-10-CM

## 2017-11-29 DIAGNOSIS — K57.20 DIVERTICULITIS OF LARGE INTESTINE WITH ABSCESS WITHOUT BLEEDING: ICD-10-CM

## 2017-11-29 DIAGNOSIS — N32.1 COLOVESICAL FISTULA: ICD-10-CM

## 2017-11-29 DIAGNOSIS — R41.82 ALTERED MENTAL STATUS, UNSPECIFIED ALTERED MENTAL STATUS TYPE: ICD-10-CM
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
## 2017-11-29 PROBLEM — K57.92 ACUTE DIVERTICULITIS OF INTESTINE: Status: ACTIVE | Noted: 2017-11-29

## 2017-11-29 LAB
ALBUMIN SERPL-MCNC: 2.8 G/DL (ref 3.2–4.6)
ALBUMIN/GLOB SERPL: 0.6 {RATIO} (ref 1.2–3.5)
ALP SERPL-CCNC: 146 U/L (ref 50–136)
ALT SERPL-CCNC: 21 U/L (ref 12–65)
ANION GAP SERPL CALC-SCNC: 10 MMOL/L (ref 7–16)
APPEARANCE UR: CLEAR
AST SERPL-CCNC: 18 U/L (ref 15–37)
ATRIAL RATE: 87 BPM
BACTERIA URNS QL MICRO: 0 /HPF
BASOPHILS # BLD: 0 K/UL (ref 0–0.2)
BASOPHILS NFR BLD: 0 % (ref 0–2)
BILIRUB SERPL-MCNC: 0.6 MG/DL (ref 0.2–1.1)
BILIRUB UR QL: NEGATIVE
BUN SERPL-MCNC: 18 MG/DL (ref 8–23)
CALCIUM SERPL-MCNC: 8.7 MG/DL (ref 8.3–10.4)
CALCULATED P AXIS, ECG09: 81 DEGREES
CALCULATED R AXIS, ECG10: 67 DEGREES
CALCULATED T AXIS, ECG11: 69 DEGREES
CASTS URNS QL MICRO: 0 /LPF
CHLORIDE SERPL-SCNC: 99 MMOL/L (ref 98–107)
CO2 SERPL-SCNC: 27 MMOL/L (ref 21–32)
COLOR UR: YELLOW
CREAT SERPL-MCNC: 0.71 MG/DL (ref 0.6–1)
CRYSTALS URNS QL MICRO: 0 /LPF
DIAGNOSIS, 93000: NORMAL
DIFFERENTIAL METHOD BLD: ABNORMAL
EOSINOPHIL # BLD: 0.1 K/UL (ref 0–0.8)
EOSINOPHIL NFR BLD: 1 % (ref 0.5–7.8)
EPI CELLS #/AREA URNS HPF: NORMAL /HPF
ERYTHROCYTE [DISTWIDTH] IN BLOOD BY AUTOMATED COUNT: 13.6 % (ref 11.9–14.6)
GLOBULIN SER CALC-MCNC: 4.5 G/DL (ref 2.3–3.5)
GLUCOSE SERPL-MCNC: 92 MG/DL (ref 65–100)
GLUCOSE UR STRIP.AUTO-MCNC: NEGATIVE MG/DL
HCT VFR BLD AUTO: 38.6 % (ref 35.8–46.3)
HGB BLD-MCNC: 12.7 G/DL (ref 11.7–15.4)
HGB UR QL STRIP: ABNORMAL
IMM GRANULOCYTES # BLD: 0 K/UL (ref 0–0.5)
IMM GRANULOCYTES NFR BLD AUTO: 0 % (ref 0–5)
KETONES UR QL STRIP.AUTO: NEGATIVE MG/DL
LACTATE BLD-SCNC: 0.7 MMOL/L (ref 0.5–1.9)
LACTATE BLD-SCNC: 1.3 MMOL/L (ref 0.5–1.9)
LEUKOCYTE ESTERASE UR QL STRIP.AUTO: ABNORMAL
LIPASE SERPL-CCNC: 110 U/L (ref 73–393)
LYMPHOCYTES # BLD: 1.9 K/UL (ref 0.5–4.6)
LYMPHOCYTES NFR BLD: 15 % (ref 13–44)
MAGNESIUM SERPL-MCNC: 2.2 MG/DL (ref 1.8–2.4)
MCH RBC QN AUTO: 29.3 PG (ref 26.1–32.9)
MCHC RBC AUTO-ENTMCNC: 32.9 G/DL (ref 31.4–35)
MCV RBC AUTO: 88.9 FL (ref 79.6–97.8)
MONOCYTES # BLD: 0.7 K/UL (ref 0.1–1.3)
MONOCYTES NFR BLD: 6 % (ref 4–12)
MUCOUS THREADS URNS QL MICRO: 0 /LPF
NEUTS SEG # BLD: 10.2 K/UL (ref 1.7–8.2)
NEUTS SEG NFR BLD: 78 % (ref 43–78)
NITRITE UR QL STRIP.AUTO: NEGATIVE
OTHER OBSERVATIONS,UCOM: NORMAL
P-R INTERVAL, ECG05: 156 MS
PH UR STRIP: 5 [PH] (ref 5–9)
PLATELET # BLD AUTO: 260 K/UL (ref 150–450)
PMV BLD AUTO: 10 FL (ref 10.8–14.1)
POTASSIUM SERPL-SCNC: 4 MMOL/L (ref 3.5–5.1)
PROCALCITONIN SERPL-MCNC: 0.1 NG/ML
PROT SERPL-MCNC: 7.3 G/DL (ref 6.3–8.2)
PROT UR STRIP-MCNC: ABNORMAL MG/DL
Q-T INTERVAL, ECG07: 336 MS
QRS DURATION, ECG06: 70 MS
QTC CALCULATION (BEZET), ECG08: 404 MS
RBC # BLD AUTO: 4.34 M/UL (ref 4.05–5.25)
RBC #/AREA URNS HPF: NORMAL /HPF
SODIUM SERPL-SCNC: 136 MMOL/L (ref 136–145)
SP GR UR REFRACTOMETRY: 1.1 (ref 1–1.02)
UROBILINOGEN UR QL STRIP.AUTO: 1 EU/DL (ref 0.2–1)
VENTRICULAR RATE, ECG03: 87 BPM
WBC # BLD AUTO: 13 K/UL (ref 4.3–11.1)
WBC URNS QL MICRO: NORMAL /HPF

## 2017-11-29 PROCEDURE — 87086 URINE CULTURE/COLONY COUNT: CPT | Performed by: EMERGENCY MEDICINE

## 2017-11-29 PROCEDURE — 80053 COMPREHEN METABOLIC PANEL: CPT | Performed by: EMERGENCY MEDICINE

## 2017-11-29 PROCEDURE — 65270000029 HC RM PRIVATE

## 2017-11-29 PROCEDURE — 86580 TB INTRADERMAL TEST: CPT | Performed by: INTERNAL MEDICINE

## 2017-11-29 PROCEDURE — 83735 ASSAY OF MAGNESIUM: CPT | Performed by: EMERGENCY MEDICINE

## 2017-11-29 PROCEDURE — 74011250637 HC RX REV CODE- 250/637: Performed by: INTERNAL MEDICINE

## 2017-11-29 PROCEDURE — 74011250636 HC RX REV CODE- 250/636: Performed by: EMERGENCY MEDICINE

## 2017-11-29 PROCEDURE — 74011000302 HC RX REV CODE- 302: Performed by: INTERNAL MEDICINE

## 2017-11-29 PROCEDURE — 83690 ASSAY OF LIPASE: CPT | Performed by: EMERGENCY MEDICINE

## 2017-11-29 PROCEDURE — 81015 MICROSCOPIC EXAM OF URINE: CPT | Performed by: EMERGENCY MEDICINE

## 2017-11-29 PROCEDURE — 87040 BLOOD CULTURE FOR BACTERIA: CPT | Performed by: EMERGENCY MEDICINE

## 2017-11-29 PROCEDURE — 74011000250 HC RX REV CODE- 250: Performed by: INTERNAL MEDICINE

## 2017-11-29 PROCEDURE — 85025 COMPLETE CBC W/AUTO DIFF WBC: CPT | Performed by: EMERGENCY MEDICINE

## 2017-11-29 PROCEDURE — 71010 XR CHEST SNGL V: CPT

## 2017-11-29 PROCEDURE — 81003 URINALYSIS AUTO W/O SCOPE: CPT | Performed by: EMERGENCY MEDICINE

## 2017-11-29 PROCEDURE — 93005 ELECTROCARDIOGRAM TRACING: CPT | Performed by: EMERGENCY MEDICINE

## 2017-11-29 PROCEDURE — 84145 PROCALCITONIN (PCT): CPT | Performed by: EMERGENCY MEDICINE

## 2017-11-29 PROCEDURE — 74011000258 HC RX REV CODE- 258: Performed by: EMERGENCY MEDICINE

## 2017-11-29 PROCEDURE — 83605 ASSAY OF LACTIC ACID: CPT

## 2017-11-29 PROCEDURE — 74011250636 HC RX REV CODE- 250/636: Performed by: INTERNAL MEDICINE

## 2017-11-29 PROCEDURE — 99283 EMERGENCY DEPT VISIT LOW MDM: CPT | Performed by: EMERGENCY MEDICINE

## 2017-11-29 RX ORDER — SODIUM CHLORIDE 0.9 % (FLUSH) 0.9 %
10 SYRINGE (ML) INJECTION
Status: COMPLETED | OUTPATIENT
Start: 2017-11-29 | End: 2017-11-29

## 2017-11-29 RX ORDER — METRONIDAZOLE 500 MG/100ML
500 INJECTION, SOLUTION INTRAVENOUS EVERY 12 HOURS
Status: DISCONTINUED | OUTPATIENT
Start: 2017-11-29 | End: 2017-12-08

## 2017-11-29 RX ORDER — MEMANTINE HYDROCHLORIDE 5 MG/1
10 TABLET ORAL 2 TIMES DAILY
Status: DISCONTINUED | OUTPATIENT
Start: 2017-11-29 | End: 2017-12-13 | Stop reason: HOSPADM

## 2017-11-29 RX ORDER — CIPROFLOXACIN 2 MG/ML
400 INJECTION, SOLUTION INTRAVENOUS EVERY 8 HOURS
Status: CANCELLED | OUTPATIENT
Start: 2017-11-29

## 2017-11-29 RX ORDER — ACETAMINOPHEN 325 MG/1
650 TABLET ORAL
Status: DISCONTINUED | OUTPATIENT
Start: 2017-11-29 | End: 2017-12-13 | Stop reason: HOSPADM

## 2017-11-29 RX ORDER — FLUTICASONE FUROATE AND VILANTEROL 200; 25 UG/1; UG/1
1 POWDER RESPIRATORY (INHALATION) DAILY
Status: DISCONTINUED | OUTPATIENT
Start: 2017-11-30 | End: 2017-11-29 | Stop reason: CLARIF

## 2017-11-29 RX ORDER — HEPARIN SODIUM 5000 [USP'U]/ML
5000 INJECTION, SOLUTION INTRAVENOUS; SUBCUTANEOUS EVERY 12 HOURS
Status: DISCONTINUED | OUTPATIENT
Start: 2017-11-30 | End: 2017-12-13 | Stop reason: HOSPADM

## 2017-11-29 RX ORDER — SODIUM CHLORIDE 9 MG/ML
75 INJECTION, SOLUTION INTRAVENOUS CONTINUOUS
Status: DISCONTINUED | OUTPATIENT
Start: 2017-11-29 | End: 2017-12-04

## 2017-11-29 RX ORDER — BUDESONIDE 0.5 MG/2ML
500 INHALANT ORAL
Status: DISCONTINUED | OUTPATIENT
Start: 2017-11-29 | End: 2017-12-13 | Stop reason: HOSPADM

## 2017-11-29 RX ORDER — MORPHINE SULFATE 2 MG/ML
1 INJECTION, SOLUTION INTRAMUSCULAR; INTRAVENOUS
Status: DISCONTINUED | OUTPATIENT
Start: 2017-11-29 | End: 2017-12-13 | Stop reason: HOSPADM

## 2017-11-29 RX ORDER — ALBUTEROL SULFATE 90 UG/1
2 AEROSOL, METERED RESPIRATORY (INHALATION)
Status: DISCONTINUED | OUTPATIENT
Start: 2017-11-29 | End: 2017-12-13 | Stop reason: HOSPADM

## 2017-11-29 RX ORDER — VANCOMYCIN HYDROCHLORIDE
1250 ONCE
Status: COMPLETED | OUTPATIENT
Start: 2017-11-29 | End: 2017-11-29

## 2017-11-29 RX ORDER — METRONIDAZOLE 500 MG/100ML
500 INJECTION, SOLUTION INTRAVENOUS EVERY 12 HOURS
Status: DISCONTINUED | OUTPATIENT
Start: 2017-11-29 | End: 2017-11-29

## 2017-11-29 RX ORDER — DULOXETINE 40 MG/1
40 CAPSULE, DELAYED RELEASE ORAL DAILY
Status: CANCELLED | OUTPATIENT
Start: 2017-11-30

## 2017-11-29 RX ORDER — ALBUTEROL SULFATE 2.5 MG/.5ML
2.5 SOLUTION RESPIRATORY (INHALATION)
Status: DISCONTINUED | OUTPATIENT
Start: 2017-11-30 | End: 2017-12-13 | Stop reason: HOSPADM

## 2017-11-29 RX ORDER — ONDANSETRON 2 MG/ML
4 INJECTION INTRAMUSCULAR; INTRAVENOUS
Status: DISCONTINUED | OUTPATIENT
Start: 2017-11-29 | End: 2017-12-13 | Stop reason: HOSPADM

## 2017-11-29 RX ADMIN — SODIUM CHLORIDE 100 ML: 900 INJECTION, SOLUTION INTRAVENOUS at 16:45

## 2017-11-29 RX ADMIN — Medication 1 AMPULE: at 23:00

## 2017-11-29 RX ADMIN — CEFEPIME HYDROCHLORIDE 2 G: 2 INJECTION, POWDER, FOR SOLUTION INTRAVENOUS at 20:19

## 2017-11-29 RX ADMIN — SODIUM CHLORIDE 1000 ML: 900 INJECTION, SOLUTION INTRAVENOUS at 20:06

## 2017-11-29 RX ADMIN — VANCOMYCIN HYDROCHLORIDE 1250 MG: 10 INJECTION, POWDER, LYOPHILIZED, FOR SOLUTION INTRAVENOUS at 20:19

## 2017-11-29 RX ADMIN — Medication 10 ML: at 16:45

## 2017-11-29 RX ADMIN — TUBERCULIN PURIFIED PROTEIN DERIVATIVE 5 UNITS: 5 INJECTION INTRADERMAL at 22:46

## 2017-11-29 RX ADMIN — METRONIDAZOLE 500 MG: 500 INJECTION, SOLUTION INTRAVENOUS at 22:46

## 2017-11-29 RX ADMIN — DIATRIZOATE MEGLUMINE AND DIATRIZOATE SODIUM 15 ML: 660; 100 LIQUID ORAL; RECTAL at 16:45

## 2017-11-29 RX ADMIN — IOPAMIDOL 100 ML: 755 INJECTION, SOLUTION INTRAVENOUS at 16:45

## 2017-11-29 RX ADMIN — SODIUM CHLORIDE 75 ML/HR: 900 INJECTION, SOLUTION INTRAVENOUS at 22:00

## 2017-11-29 RX ADMIN — MEMANTINE HYDROCHLORIDE 10 MG: 5 TABLET ORAL at 22:48

## 2017-11-29 NOTE — ED TRIAGE NOTES
Pt to ed with family from ct scan with possible admission with abscess that is possibly connected to her bladder - no n/v/d - dizziness - multiple falls - uti symptoms - elevated wbc

## 2017-11-29 NOTE — IP AVS SNAPSHOT
Lashonda Aki 
 
 
 [address] [facility] [address] [facility] 
[phone] Patient: Elfego Martinez MRN: [id number] GFJ:1[birth year] My Medications STOP taking these medications   
 cephALEXin 500 mg capsule Commonly known as:  Deanna Officer TAKE these medications as instructed Instructions Each Dose to Equal  
 Morning Noon Evening Bedtime ATIVAN 0.5 mg tablet Generic drug:  LORazepam  
   
Your last dose was: Your next dose is: Take 0.5 mg by mouth every six (6) hours as needed for Anxiety. 0.5 mg  
    
   
   
   
  
 BREO ELLIPTA 200-25 mcg/dose inhaler Generic drug:  fluticasone-vilanterol Your last dose was: Your next dose is: Take 1 Puff by inhalation daily. 1 Puff CHLORASEPTIC SORE THROAT 6-10 mg Lozg Generic drug:  Benzocaine-Menthol Your last dose was: Your next dose is:    
   
   
 1-2 Lozenges by Mucous Membrane route every four (4) hours as needed for Other (sore throat). Contracted Patient with [facility]. Using patient supply of home med. 1-2 Lozenge  
    
   
   
   
  
 ciclopirox 0.77 % topical cream  
Commonly known as:  Sarah [place] Your last dose was: Your next dose is:    
   
   
 Apply 1 g to affected area two (2) times a day. To affected area bid. Contracted Patient with [facility]. Using patient supply of home med. 1 g  
    
   
   
   
  
 ciprofloxacin HCl 500 mg tablet Commonly known as:  CIPRO Your last dose was: Your next dose is: Take 1 Tab by mouth every twelve (12) hours. 500 mg  
    
   
   
   
  
 COLACE 100 mg capsule Generic drug:  docusate sodium Your last dose was: Your next dose is: Take 100 mg by mouth two (2) times daily as needed for Constipation.   
 100 mg  
    
   
   
   
  
 CYMBALTA 40 mg Cpdr  
 Generic drug:  DULoxetine Your last dose was: Your next dose is: Take 40 mg by mouth daily. 40 mg  
    
   
   
   
  
 ergocalciferol (vitamin D2) 2,000 unit Tab Your last dose was: Your next dose is: Take 2,000 Units by mouth daily. 2000 Units MEGARED OMEGA-3 KRILL -28-39-50 mg Cap Generic drug:  krill-om-3-dha-epa-phospho-ast  
   
Your last dose was: Your next dose is: Take 300 mg by mouth daily. 300 mg  
    
   
   
   
  
 metroNIDAZOLE 500 mg tablet Commonly known as:  FLAGYL Your last dose was: Your next dose is: Take 1 Tab by mouth every twelve (12) hours. 500 mg NAMENDA 10 mg tablet Generic drug:  memantine Your last dose was: Your next dose is: Take 10 mg by mouth two (2) times a day. 10 mg PROVENTIL HFA 90 mcg/actuation inhaler Generic drug:  albuterol Your last dose was: Your next dose is: Take 2 Puffs by inhalation every four (4) hours as needed for Wheezing or Shortness of Breath. 2 Puff Where to Get Your Medications Information on where to get these meds will be given to you by the nurse or doctor. ! Ask your nurse or doctor about these medications  
  ciprofloxacin HCl 500 mg tablet  
 metroNIDAZOLE 500 mg tablet

## 2017-11-29 NOTE — IP AVS SNAPSHOT
Merlin Laroche 
 
 
 [address] 
[phone] Patient: Reyes Levans MRN: [id number] JBR:1[birth year]/5097 About your hospitalization You were admitted on:  November 29, 2017 You last received care in the:  [facility] 6 MED SURG You were discharged on:  December 13, 2017 Why you were hospitalized Your primary diagnosis was:  Acute Diverticulitis Of Intestine Your diagnoses also included:  Colovesical Fistula, Abscess, Abdomen (Hcc), Copd (Chronic Obstructive Pulmonary Disease) (Hcc), Hypothyroidism, Dementia Without Behavioral Disturbance, Fall Things You Need To Do (next 8 weeks) Follow up with Samantha Lamar MD  
  
Phone:  [phone] Where:  [address] [place] [address], [facility] [address] Follow up with Samantha Lamar MD in 1 week(s) Phone:  [phone] Where:  [address] [place] [address], [facility] [address] Discharge Orders None A check tom indicates which time of day the medication should be taken. My Medications STOP taking these medications   
 cephALEXin 500 mg capsule Commonly known as:  Crystal Screws TAKE these medications as instructed Instructions Each Dose to Equal  
 Morning Noon Evening Bedtime ATIVAN 0.5 mg tablet Generic drug:  LORazepam  
   
Your last dose was: Your next dose is: Take 0.5 mg by mouth every six (6) hours as needed for Anxiety. 0.5 mg  
    
   
   
   
  
 BREO ELLIPTA 200-25 mcg/dose inhaler Generic drug:  fluticasone-vilanterol Your last dose was: Your next dose is: Take 1 Puff by inhalation daily. 1 Puff CHLORASEPTIC SORE THROAT 6-10 mg Lozg Generic drug:  Benzocaine-Menthol Your last dose was:     
   
Your next dose is:    
   
   
 1-2 Lozenges by Mucous Membrane route every four (4) hours as needed for Other (sore throat). Contracted Patient with [facility]. Using patient supply of home med. 1-2 Lozenge  
    
   
   
   
  
 ciclopirox 0.77 % topical cream  
Commonly known as:  Cuca Arms Your last dose was: Your next dose is:    
   
   
 Apply 1 g to affected area two (2) times a day. To affected area bid. Contracted Patient with [facility]. Using patient supply of home med. 1 g  
    
   
   
   
  
 ciprofloxacin HCl 500 mg tablet Commonly known as:  CIPRO Your last dose was: Your next dose is: Take 1 Tab by mouth every twelve (12) hours. 500 mg  
    
   
   
   
  
 COLACE 100 mg capsule Generic drug:  docusate sodium Your last dose was: Your next dose is: Take 100 mg by mouth two (2) times daily as needed for Constipation. 100 mg  
    
   
   
   
  
 CYMBALTA 40 mg Cpdr  
Generic drug:  DULoxetine Your last dose was: Your next dose is: Take 40 mg by mouth daily. 40 mg  
    
   
   
   
  
 ergocalciferol (vitamin D2) 2,000 unit Tab Your last dose was: Your next dose is: Take 2,000 Units by mouth daily. 2000 Units MEGARED OMEGA-3 KRILL -99-63-50 mg Cap Generic drug:  krill-om-3-dha-epa-phospho-ast  
   
Your last dose was: Your next dose is: Take 300 mg by mouth daily. 300 mg  
    
   
   
   
  
 metroNIDAZOLE 500 mg tablet Commonly known as:  FLAGYL Your last dose was: Your next dose is: Take 1 Tab by mouth every twelve (12) hours. 500 mg NAMENDA 10 mg tablet Generic drug:  memantine Your last dose was: Your next dose is: Take 10 mg by mouth two (2) times a day. 10 mg PROVENTIL HFA 90 mcg/actuation inhaler Generic drug:  albuterol Your last dose was: Your next dose is: Take 2 Puffs by inhalation every four (4) hours as needed for Wheezing or Shortness of Breath. 2 Puff Where to Get Your Medications Information on where to get these meds will be given to you by the nurse or doctor. ! Ask your nurse or doctor about these medications  
  ciprofloxacin HCl 500 mg tablet  
 metroNIDAZOLE 500 mg tablet Discharge Instructions NUTRITION Continue Oral Nutrition Supplement (ONS) at discharge. Recommend Ensure Enlive or a comparable/similar product Three times daily Kady Ponce, RD, LD [employer] Announcement We are excited to announce that we are making your provider's discharge notes available to you in [employer]. You will see these notes when they are completed and signed by the physician that discharged you from your recent hospital stay. If you have any questions or concerns about any information you see in [employer], please call the Health Information Department where you were seen or reach out to your Primary Care Provider for more information about your plan of care. Introducing hospitals & HEALTH SERVICES! Guy Mcdermott introduces [employer] patient portal. Now you can access parts of your medical record, email your doctor's office, and request medication refills online. 1. In your internet browser, go to [email or web address] 2. Click on the First Time User? Click Here link in the Sign In box. You will see the New Member Sign Up page. 3. Enter your [employer] Access Code exactly as it appears below. You will not need to use this code after youve completed the sign-up process. If you do not sign up before the expiration date, you must request a new code. · [employer] Access Code: [email or web address] Expires: 2/26/2018 10:50 AM 
 
4.  Enter the last four digits of your Social Security Number (xxxx) and Date of Birth (mm/dd/yyyy) as indicated and click Submit. You will be taken to the next sign-up page. 5. Create a [employer] ID. This will be your [employer] login ID and cannot be changed, so think of one that is secure and easy to remember. 6. Create a [employer] password. You can change your password at any time. 7. Enter your Password Reset Question and Answer. This can be used at a later time if you forget your password. 8. Enter your e-mail address. You will receive e-mail notification when new information is available in [address] 9. Click Sign Up. You can now view and download portions of your medical record. 10. Click the Download Summary menu link to download a portable copy of your medical information. If you have questions, please visit the Frequently Asked Questions section of the [employer] website. Remember, [employer] is NOT to be used for urgent needs. For medical emergencies, dial 911. Now available from your iPhone and Android! Providers Seen During Your Hospitalization Provider Specialty Primary office phone Franco Fernández MD Emergency Medicine [phone] Audra Robertson MD Internal Medicine [phone] Angel Sanderson MD Family Practice [phone] Immunizations Administered for This Admission Name Date  
 TB Skin Test (PPD) Intradermal 11/29/2017 Your Primary Care Physician (PCP) Primary Care Physician Office Phone Office Fax Ricco Frost [phone] You are allergic to the following No active allergies Recent Documentation Height Weight BMI Smoking Status 1.6 m 63.5 kg 24.8 kg/m2 Former Smoker Emergency Contacts Name Discharge Info Relation Home Work Mobile Lauren Love  Daughter [21] [phone] Patient Belongings  The following personal items are in your possession at time of discharge: 
  Dental Appliances: Uppers, Lowers, With patient         Home Medications: None   Jewelry: None  Clothing: Pants, Shirt    Other Valuables: None Please provide this summary of care documentation to your next provider. Signatures-by signing, you are acknowledging that this After Visit Summary has been reviewed with you and you have received a copy. Patient Signature:  ____________________________________________________________ Date:  ____________________________________________________________  
  
Aloma Snellen Provider Signature:  ____________________________________________________________ Date:  ____________________________________________________________

## 2017-11-30 LAB
ANION GAP SERPL CALC-SCNC: 12 MMOL/L (ref 7–16)
BASOPHILS # BLD: 0 K/UL (ref 0–0.2)
BASOPHILS NFR BLD: 0 % (ref 0–2)
BUN SERPL-MCNC: 14 MG/DL (ref 8–23)
CALCIUM SERPL-MCNC: 8.3 MG/DL (ref 8.3–10.4)
CHLORIDE SERPL-SCNC: 104 MMOL/L (ref 98–107)
CO2 SERPL-SCNC: 24 MMOL/L (ref 21–32)
CREAT SERPL-MCNC: 0.52 MG/DL (ref 0.6–1)
DIFFERENTIAL METHOD BLD: ABNORMAL
EOSINOPHIL # BLD: 0.1 K/UL (ref 0–0.8)
EOSINOPHIL NFR BLD: 1 % (ref 0.5–7.8)
ERYTHROCYTE [DISTWIDTH] IN BLOOD BY AUTOMATED COUNT: 13.5 % (ref 11.9–14.6)
GLUCOSE SERPL-MCNC: 82 MG/DL (ref 65–100)
HCT VFR BLD AUTO: 35.2 % (ref 35.8–46.3)
HGB BLD-MCNC: 11.4 G/DL (ref 11.7–15.4)
IMM GRANULOCYTES # BLD: 0 K/UL (ref 0–0.5)
IMM GRANULOCYTES NFR BLD AUTO: 0 % (ref 0–5)
LYMPHOCYTES # BLD: 1.7 K/UL (ref 0.5–4.6)
LYMPHOCYTES NFR BLD: 18 % (ref 13–44)
MCH RBC QN AUTO: 28.6 PG (ref 26.1–32.9)
MCHC RBC AUTO-ENTMCNC: 32.4 G/DL (ref 31.4–35)
MCV RBC AUTO: 88.2 FL (ref 79.6–97.8)
MONOCYTES # BLD: 0.6 K/UL (ref 0.1–1.3)
MONOCYTES NFR BLD: 6 % (ref 4–12)
NEUTS SEG # BLD: 7.2 K/UL (ref 1.7–8.2)
NEUTS SEG NFR BLD: 75 % (ref 43–78)
PLATELET # BLD AUTO: 230 K/UL (ref 150–450)
PMV BLD AUTO: 9.7 FL (ref 10.8–14.1)
POTASSIUM SERPL-SCNC: 3.9 MMOL/L (ref 3.5–5.1)
RBC # BLD AUTO: 3.99 M/UL (ref 4.05–5.25)
SODIUM SERPL-SCNC: 140 MMOL/L (ref 136–145)
TSH SERPL DL<=0.005 MIU/L-ACNC: 1.54 UIU/ML (ref 0.36–3.74)
WBC # BLD AUTO: 9.6 K/UL (ref 4.3–11.1)

## 2017-11-30 PROCEDURE — 94640 AIRWAY INHALATION TREATMENT: CPT

## 2017-11-30 PROCEDURE — 94760 N-INVAS EAR/PLS OXIMETRY 1: CPT

## 2017-11-30 PROCEDURE — 74011000258 HC RX REV CODE- 258: Performed by: INTERNAL MEDICINE

## 2017-11-30 PROCEDURE — 97162 PT EVAL MOD COMPLEX 30 MIN: CPT

## 2017-11-30 PROCEDURE — 85025 COMPLETE CBC W/AUTO DIFF WBC: CPT | Performed by: INTERNAL MEDICINE

## 2017-11-30 PROCEDURE — 84443 ASSAY THYROID STIM HORMONE: CPT | Performed by: INTERNAL MEDICINE

## 2017-11-30 PROCEDURE — 74011250636 HC RX REV CODE- 250/636: Performed by: INTERNAL MEDICINE

## 2017-11-30 PROCEDURE — 77010033678 HC OXYGEN DAILY

## 2017-11-30 PROCEDURE — 65270000029 HC RM PRIVATE

## 2017-11-30 PROCEDURE — 80048 BASIC METABOLIC PNL TOTAL CA: CPT | Performed by: INTERNAL MEDICINE

## 2017-11-30 PROCEDURE — 74011000250 HC RX REV CODE- 250: Performed by: INTERNAL MEDICINE

## 2017-11-30 PROCEDURE — 36415 COLL VENOUS BLD VENIPUNCTURE: CPT | Performed by: INTERNAL MEDICINE

## 2017-11-30 PROCEDURE — 74011250637 HC RX REV CODE- 250/637: Performed by: INTERNAL MEDICINE

## 2017-11-30 RX ORDER — DOCUSATE SODIUM 100 MG/1
100 CAPSULE, LIQUID FILLED ORAL
COMMUNITY

## 2017-11-30 RX ORDER — LORAZEPAM 0.5 MG/1
0.5 TABLET ORAL
COMMUNITY

## 2017-11-30 RX ADMIN — ACETAMINOPHEN 650 MG: 325 TABLET ORAL at 23:38

## 2017-11-30 RX ADMIN — Medication 1 AMPULE: at 09:22

## 2017-11-30 RX ADMIN — ALBUTEROL SULFATE 2.5 MG: 2.5 SOLUTION RESPIRATORY (INHALATION) at 19:24

## 2017-11-30 RX ADMIN — ALBUTEROL SULFATE 2.5 MG: 2.5 SOLUTION RESPIRATORY (INHALATION) at 02:07

## 2017-11-30 RX ADMIN — BUDESONIDE 500 MCG: 0.5 INHALANT RESPIRATORY (INHALATION) at 19:24

## 2017-11-30 RX ADMIN — ALBUTEROL SULFATE 2.5 MG: 2.5 SOLUTION RESPIRATORY (INHALATION) at 07:33

## 2017-11-30 RX ADMIN — ALBUTEROL SULFATE 2.5 MG: 2.5 SOLUTION RESPIRATORY (INHALATION) at 13:57

## 2017-11-30 RX ADMIN — BUDESONIDE 500 MCG: 0.5 INHALANT RESPIRATORY (INHALATION) at 07:33

## 2017-11-30 RX ADMIN — Medication 1 AMPULE: at 21:00

## 2017-11-30 RX ADMIN — METRONIDAZOLE 500 MG: 500 INJECTION, SOLUTION INTRAVENOUS at 12:08

## 2017-11-30 RX ADMIN — MEMANTINE HYDROCHLORIDE 10 MG: 5 TABLET ORAL at 17:42

## 2017-11-30 RX ADMIN — SODIUM CHLORIDE 2 G: 900 INJECTION, SOLUTION INTRAVENOUS at 22:16

## 2017-11-30 RX ADMIN — MEMANTINE HYDROCHLORIDE 10 MG: 5 TABLET ORAL at 09:21

## 2017-11-30 RX ADMIN — BUDESONIDE 500 MCG: 0.5 INHALANT RESPIRATORY (INHALATION) at 02:07

## 2017-11-30 RX ADMIN — HEPARIN SODIUM 5000 UNITS: 5000 INJECTION, SOLUTION INTRAVENOUS; SUBCUTANEOUS at 17:42

## 2017-11-30 NOTE — ED PROVIDER NOTES
HPI Comments: Presents with complaint of abnormal CT. Patient has a history of dementia and lives in a nursing home. Daughter reports she has been more sleepy complaining of dizziness having multiple falls and a presumed UTI for several weeks. She was treated with Cipro last week with no change in her status. She has lost 15 pounds over the past 2 months. Patient reports no abdominal pain and she feels fine currently. Patient is from [facility] and approximately a year ago had diverticulitis with an abscess and was deemed a nonsurgical candidate and got septic. She was on hospice about 6 months ago but got much better. Daughter is open to surgical intervention at this time. Patient is a 80 y.o. female presenting with abdominal pain. The history is provided by the patient and a relative. The history is limited by the condition of the patient. Abdominal Pain    This is a new problem. The current episode started more than 1 week ago. The problem occurs constantly. The problem has been gradually worsening. The pain is associated with an unknown factor. The pain is at a severity of 0/10. The patient is experiencing no pain. Associated symptoms include dysuria and frequency. Pertinent negatives include no fever, no diarrhea, no nausea, no vomiting and no constipation. Nothing worsens the pain. The pain is relieved by nothing. Her past medical history is significant for diverticulitis. The patient's surgical history includes cholecystectomy.        Past Medical History:   Diagnosis Date    COPD (chronic obstructive pulmonary disease) (HCC)     Depression     HLD (hyperlipidemia)     Hypothyroidism     Nocturnal hypoxia        Past Surgical History:   Procedure Laterality Date    HX CHOLECYSTECTOMY           Family History:   Problem Relation Age of Onset    Hypertension Mother     Heart Disease Father     Cancer Sister     Dementia Sister     Thyroid Disease Sister     Dementia Brother Social History     Social History    Marital status:      Spouse name: N/A    Number of children: N/A    Years of education: N/A     Occupational History    Not on file. Social History Main Topics    Smoking status: Former Smoker     Packs/day: 1.00     Years: 50.00    Smokeless tobacco: Never Used    Alcohol use 2.4 oz/week     4 Glasses of wine per week    Drug use: No    Sexual activity: Not Currently     Partners: Male     Other Topics Concern    Not on file     Social History Narrative         ALLERGIES: Review of patient's allergies indicates no known allergies. Review of Systems   Constitutional: Negative for fever. Gastrointestinal: Positive for abdominal pain. Negative for constipation, diarrhea, nausea and vomiting. Genitourinary: Positive for dysuria and frequency. All other systems reviewed and are negative. Vitals:    11/29/17 1852   BP: 117/63   Pulse: 98   Resp: 20   Temp: 97.7 °F (36.5 °C)   SpO2: 91%   Weight: 63.5 kg (140 lb)   Height: 5' 3\" (1.6 m)            Physical Exam   Constitutional: She appears well-developed and well-nourished. No distress. HENT:   Head: Normocephalic and atraumatic. Neck: Normal range of motion. Neck supple. Cardiovascular: Normal rate and regular rhythm. Pulmonary/Chest: Effort normal and breath sounds normal. No respiratory distress. She has no wheezes. She has no rales. Abdominal: Soft. Bowel sounds are normal. She exhibits no distension. There is tenderness (suprapubic). There is no rebound and no guarding. Musculoskeletal: Normal range of motion. She exhibits no edema. Neurological: She is alert. No cranial nerve deficit. Coordination normal.   Pleasantly  demented   Skin: Skin is warm and dry. No rash noted. She is not diaphoretic. No erythema. Psychiatric: She has a normal mood and affect. Her behavior is normal.   Nursing note and vitals reviewed.        MDM  Number of Diagnoses or Management Options  Diagnosis management comments: Patient had a CT with PCP if showed diverticulitis with multiple abscesses and a colovesicular fistula. She came down from CT and no information was sent from the nursing home and will attempt to obtain not. I reviewed her chart and she was discharged from the [address] in July and seen yesterday and started on Keflex. I discussed her case with Dr. Pam Lal with surgery and he will see the patient in consult and have the hospitalist admit for IV antibiotics.          Amount and/or Complexity of Data Reviewed  Clinical lab tests: ordered and reviewed  Decide to obtain previous medical records or to obtain history from someone other than the patient: yes (dtr  )  Review and summarize past medical records: yes  Discuss the patient with other providers: yes  Independent visualization of images, tracings, or specimens: yes (NSR no ST elevation)    Risk of Complications, Morbidity, and/or Mortality  Presenting problems: high  Diagnostic procedures: moderate  Management options: high    Patient Progress  Patient progress: stable    ED Course       Procedures

## 2017-11-30 NOTE — PROGRESS NOTES
's visit attempted. Staff at bedside. Provided 's card to staff for sharing with patient.      Roberto Dempsey 68  Board Certified

## 2017-11-30 NOTE — CONSULTS
Consult Note   Adele Otto  MRN: [id number]  :1933  Age:84 y.o.    HPI: Adele Otto is a 80 y.o. female who has PMHx of COPD on home oxygen, dementia, Hypothyroidism who was brought in by her daughter after she was diagnosed with acute diverticulitis and intraabdominal abscess with colovesical fistula on CT by PCP. Pt has had two episodes in 1 year, as she was also admitted at [facility] for sepsis after an intraabdominal infection. She was not a candidate for surgery at that time and received long-term Abx via PICC line. Ms Ynes Junior was previously on hospice due to her COPD and dementia, but was recently discharged and send to SNF. Information gleaned from chart review as pt does not have family at bedside currently and is pleasantly confused. Has had episodes of UTI X 6mo with recent trial of keflex 1 week ago. Pt's daughter reported that she complains of dysuria, foul-smell in urine, decreased appetite, confusion and multiple episodes of falls according to chart. Pt's daughter denies pt has had vomiting, fever, chills, abdominal pain, diarrhea, melena, hematochezia. Pt denies current abdominal pain and reports no changes in BMs. IR has seen pt and determined there is high risk of bowel perforation with attempted drainage of abscess therefore general surgery was consulted for surgical evaluation. WBC 9.6 today. AF, VSS. Pt is on vanc, flagyl, & cefepime. CTAP 17:    FINDINGS:  Abdomen CT:  The lung bases are clear. There are no lesions in the liver or  spleen. The adrenal glands and pancreas appear normal.  There is normal  enhancement of the kidneys. There is no hydronephrosis. There is no adenopathy. There is mild diffuse prominence of stool pattern. There is no significant  small bowel distention. There are no inflammatory changes or fluid collections  in the abdomen.     Pelvis CT: There is inflammation of the sigmoid colon consistent with acute  diverticulitis.   There is a complex air-fluid collection inferior and posterior  to the proximal sigmoid colon extending into the mesentery, at least 5 cm in  length. There is a second separate complex fluid collection between the mid  sigmoid colon and the bladder, 4 cm in diameter. This involves the wall of the  bladder. There is air in the bladder lumen suggesting a colovesical fistula. There are calcified fibroids in the uterus. There is no significant pelvic  adenopathy. There are no bony lesions.     IMPRESSION: Acute sigmoid diverticulitis with 2 separate abscess collections. Associated colovesical fistula.       Past Medical History:   Diagnosis Date    COPD (chronic obstructive pulmonary disease) ([facility]     Depression     HLD (hyperlipidemia)     Hypothyroidism     Nocturnal hypoxia      Past Surgical History:   Procedure Laterality Date    HX CHOLECYSTECTOMY       Current Facility-Administered Medications   Medication Dose Route Frequency    albuterol (PROVENTIL HFA, VENTOLIN HFA, PROAIR HFA) inhaler 2 Puff  2 Puff Inhalation Q4H PRN    memantine (NAMENDA) tablet 10 mg  10 mg Oral BID    0.9% sodium chloride infusion  75 mL/hr IntraVENous CONTINUOUS    heparin (porcine) injection 5,000 Units  5,000 Units SubCUTAneous Q12H    ondansetron (ZOFRAN) injection 4 mg  4 mg IntraVENous Q8H PRN    acetaminophen (TYLENOL) tablet 650 mg  650 mg Oral Q6H PRN    morphine injection 1 mg  1 mg IntraVENous Q4H PRN    metroNIDAZOLE (FLAGYL) IVPB premix 500 mg  500 mg IntraVENous Q12H    tuberculin injection 5 Units  5 Units IntraDERMal ONCE    budesonide (PULMICORT) 500 mcg/2 ml nebulizer suspension  500 mcg Nebulization BID RT    And    albuterol CONCENTRATE 2.5mg/0.5 mL neb soln  2.5 mg Nebulization Q6H RT    alcohol 62% (NOZIN) nasal  1 Ampule  1 Ampule Topical Q12H    cefepime (MAXIPIME) 2 g in 0.9% sodium chloride (MBP/ADV) 100 mL ADV  2 g IntraVENous Q24H     Review of patient's allergies indicates no known allergies. Social History     Social History    Marital status:      Spouse name: N/A    Number of children: N/A    Years of education: N/A     Social History Main Topics    Smoking status: Former Smoker     Packs/day: 1.00     Years: 50.00    Smokeless tobacco: Never Used    Alcohol use 2.4 oz/week     4 Glasses of wine per week    Drug use: No    Sexual activity: Not Currently     Partners: Male     Other Topics Concern    None     Social History Narrative     History   Smoking Status    Former Smoker    Packs/day: 1.00    Years: 50.00   Smokeless Tobacco    Never Used     Family History   Problem Relation Age of Onset    Hypertension Mother     Heart Disease Father     Cancer Sister     Dementia Sister     Thyroid Disease Sister     Dementia Brother      ROS: The patient has no difficulty with chest pain or shortness of breath. No fever or chills. Comprehensive review of systems was otherwise unremarkable except as noted above. Physical Exam:   Visit Vitals    /72 (BP 1 Location: Right arm, BP Patient Position: At rest)    Pulse (!) 104    Temp 98.3 °F (36.8 °C)    Resp 18    Ht 5' 3\" (1.6 m)    Wt 140 lb (63.5 kg)    SpO2 97%    BMI 24.8 kg/m2     Constitutional: Alert, disoriented to time and situation, cooperative patient in no acute distress; appears stated age    Eyes:Sclera are clear. EOMs intact  ENMT: no external lesions gross hearing normal; no obvious neck masses, no ear or lip lesions, nares normal  CV: RRR. Normal perfusion  Resp: No JVD. Breathing is  non-labored; no audible wheezing. CTAB  GI: soft and slightly-distended; bowel sounds normoactive X 4; TTP LLQ. - rebound tenderness. Musculoskeletal: unremarkable with normal function. No embolic signs or cyanosis.    Neuro:  Oriented; moves all 4; no focal deficits  Psychiatric: normal affect and mood, no memory impairment    Recent vitals (if inpt):  Patient Vitals for the past 24 hrs:   BP Temp Pulse Resp SpO2 Height Weight   11/30/17 1100 130/72 98.3 °F (36.8 °C) (!) 104 18 97 % - -   11/30/17 0737 - - - - (!) 86 % - -   11/30/17 0701 136/72 97.4 °F (36.3 °C) 95 18 90 % - -   11/30/17 0508 128/67 98.4 °F (36.9 °C) 77 18 95 % - -   11/30/17 0209 - - - - 92 % - -   11/29/17 2200 130/61 97.8 °F (36.6 °C) 91 18 94 % - -   11/29/17 2100 - - - - 99 % - -   11/29/17 1852 117/63 97.7 °F (36.5 °C) 98 20 91 % 5' 3\" (1.6 m) 140 lb (63.5 kg)       Labs:  Recent Labs      11/30/17   0533  11/29/17   1857   WBC  9.6  13.0*   HGB  11.4*  12.7   PLT  230  260   NA  140  136   K  3.9  4.0   CL  104  99   CO2  24  27   BUN  14  18   CREA  0.52*  0.71   GLU  82  92   TBILI   --   0.6   SGOT   --   18   ALT   --   21   AP   --   146*   LPSE   --   110       Lab Results   Component Value Date/Time    WBC 9.6 11/30/2017 05:33 AM    HGB 11.4 11/30/2017 05:33 AM    PLATELET 930 11/79/6838 05:33 AM    Sodium 140 11/30/2017 05:33 AM    Potassium 3.9 11/30/2017 05:33 AM    Chloride 104 11/30/2017 05:33 AM    CO2 24 11/30/2017 05:33 AM    BUN 14 11/30/2017 05:33 AM    Creatinine 0.52 11/30/2017 05:33 AM    Glucose 82 11/30/2017 05:33 AM    Bilirubin, total 0.6 11/29/2017 06:57 PM    AST (SGOT) 18 11/29/2017 06:57 PM    ALT (SGPT) 21 11/29/2017 06:57 PM    Alk. phosphatase 146 11/29/2017 06:57 PM    Lipase 110 11/29/2017 06:57 PM       I reviewed recent labs and recent radiologic studies. I independently reviewed radiology images for studies I described above or studies I have ordered.    Admission date (for inpatients): 11/29/2017   * No surgery found *  * No surgery found *    ASSESSMENT/PLAN:  Problem List  Date Reviewed: 11/29/2017          Codes Class Noted    Colovesical fistula ICD-10-CM: N32.1  ICD-9-CM: 596.1  11/29/2017        Abscess, abdomen ([facility] [facility] ICD-10-CM: K65.1  ICD-9-CM: 567.22  11/29/2017        * (Principal)Acute diverticulitis of intestine ICD-10-CM: K57.92  ICD-9-CM: 562.11  11/29/2017        Altered mental status, unspecified ICD-10-CM: R41.82  ICD-9-CM: 780.97  11/28/2017        Diverticulitis of large intestine with abscess without bleeding ICD-10-CM: K57.20  ICD-9-CM: 562.11, 569.5  11/28/2017        Dysuria ICD-10-CM: R30.0  ICD-9-CM: 788.1  11/28/2017        Fall ICD-10-CM: T43. Daphene Ego  ICD-9-CM: E888.9  11/28/2017        Onychomycosis due to dermatophyte ICD-10-CM: B35.1  ICD-9-CM: 110.1  7/31/2017        COPD (chronic obstructive pulmonary disease) ([facility] ICD-10-CM: J44.9  ICD-9-CM: 755  5/9/2017        Depression ICD-10-CM: F32.9  ICD-9-CM: 259  5/9/2017        Hypothyroidism ICD-10-CM: E03.9  ICD-9-CM: 244.9  5/9/2017        Nocturnal hypoxia ICD-10-CM: G47.34  ICD-9-CM: 327.24  5/9/2017        Dementia without behavioral disturbance ICD-10-CM: F03.90  ICD-9-CM: 294.20  5/9/2017            Principal Problem:    Acute diverticulitis of intestine (11/29/2017)    Active Problems:    COPD (chronic obstructive pulmonary disease) ([facility] [facility] (5/9/2017)      Hypothyroidism (5/9/2017)      Dementia without behavioral disturbance (5/9/2017)      Fall (11/28/2017)      Colovesical fistula (11/29/2017)      Abscess, abdomen ([facility] (11/29/2017)       Plan:  Continue IV Abx  Continue bowel rest. Keep NPO. Continue IVF. Will discuss the possibility of diversion with family.      Signed:  LYN Zhou

## 2017-11-30 NOTE — PROGRESS NOTES
Hourly rounds completed. Patient attempting to get out of bed multiple times without assistance through the night. Bed alarm set. Patient now resting quietly. No needs at this time.

## 2017-11-30 NOTE — ED NOTES
This RN called [facility] living [facility] to request pt records per Dr. Josselin Barba

## 2017-11-30 NOTE — CONSULTS
Department of Interventional Radiology  [phone]        Consult Note     Patient: Valerie Mtz MRN: [id number]  SSN: [id number]    YOB: 1933  Age: 80 y.o. Sex: female      Referring Physician: Dr Frank Vanessa Date: 11/30/2017     Subjective:     Chief Complaint: Intraperitoneal abscesses. History of Present Illness: EMR and CT scan reviewed. Patient was not examined. CT shows a small, 3.0 cm gas/fluid abscess in the wall of the bladder, with small intestine draped over it. There is phlegmon with inflammatory stranding and fat retraction in the LLQ, as well. Percutaneous drainage of the small abscess is high-risk for small bowel injury and should only be performed if surgical resection/bowel diversion is not an option. I have called the General Surgery answering service and placed a consult this morning. Discussed ebony Vega from the General Surgery service. I will await their input before discussing drain placement with the patient and her daughter.       Bisi Kim MD          Past Medical History:   Diagnosis Date    COPD (chronic obstructive pulmonary disease) ([address]     Depression     HLD (hyperlipidemia)     Hypothyroidism     Nocturnal hypoxia     Dementia   Previous episode of Diverticulitis    Past Surgical History:   Procedure Laterality Date    HX CHOLECYSTECTOMY        Family History   Problem Relation Age of Onset    Hypertension Mother     Heart Disease Father     Cancer Sister     Dementia Sister     Thyroid Disease Sister     Dementia Brother      Social History   Substance Use Topics    Smoking status: Former Smoker     Packs/day: 1.00     Years: 50.00    Smokeless tobacco: Never Used    Alcohol use 2.4 oz/week     4 Glasses of wine per week      No Known Allergies  Current Facility-Administered Medications   Medication Dose Route Frequency    albuterol (PROVENTIL HFA, VENTOLIN HFA, PROAIR HFA) inhaler 2 Puff  2 Puff Inhalation Q4H PRN    memantine (NAMENDA) tablet 10 mg  10 mg Oral BID    0.9% sodium chloride infusion  75 mL/hr IntraVENous CONTINUOUS    heparin (porcine) injection 5,000 Units  5,000 Units SubCUTAneous Q12H    ondansetron (ZOFRAN) injection 4 mg  4 mg IntraVENous Q8H PRN    acetaminophen (TYLENOL) tablet 650 mg  650 mg Oral Q6H PRN    morphine injection 1 mg  1 mg IntraVENous Q4H PRN    metroNIDAZOLE (FLAGYL) IVPB premix 500 mg  500 mg IntraVENous Q12H    tuberculin injection 5 Units  5 Units IntraDERMal ONCE    budesonide (PULMICORT) 500 mcg/2 ml nebulizer suspension  500 mcg Nebulization BID RT    And    albuterol CONCENTRATE 2.5mg/0.5 mL neb soln  2.5 mg Nebulization Q6H RT    alcohol 62% (NOZIN) nasal  1 Ampule  1 Ampule Topical Q12H    cefepime (MAXIPIME) 2 g in 0.9% sodium chloride (MBP/ADV) 100 mL ADV  2 g IntraVENous Q24H            Objective:     Physical Exam:  Visit Vitals    /72 (BP 1 Location: Right arm, BP Patient Position: At rest)    Pulse 95    Temp 97.4 °F (36.3 °C)    Resp 18    Ht 5' 3\" (1.6 m)    Wt 63.5 kg (140 lb)    SpO2 90%    BMI 24.8 kg/m2        Lab/Data Review:  BMP:   Lab Results   Component Value Date/Time     11/30/2017 05:33 AM    K 3.9 11/30/2017 05:33 AM     11/30/2017 05:33 AM    CO2 24 11/30/2017 05:33 AM    AGAP 12 11/30/2017 05:33 AM    GLU 82 11/30/2017 05:33 AM    BUN 14 11/30/2017 05:33 AM    CREA 0.52 (L) 11/30/2017 05:33 AM    GFRAA >60 11/30/2017 05:33 AM    GFRNA >60 11/30/2017 05:33 AM     CMP:   Lab Results   Component Value Date/Time     11/30/2017 05:33 AM    K 3.9 11/30/2017 05:33 AM     11/30/2017 05:33 AM    CO2 24 11/30/2017 05:33 AM    AGAP 12 11/30/2017 05:33 AM    GLU 82 11/30/2017 05:33 AM    BUN 14 11/30/2017 05:33 AM    CREA 0.52 (L) 11/30/2017 05:33 AM    GFRAA >60 11/30/2017 05:33 AM    GFRNA >60 11/30/2017 05:33 AM    CA 8.3 11/30/2017 05:33 AM    MG 2.2 11/29/2017 06:57 PM    ALB 2.8 (L) 11/29/2017 06:57 PM    TP 7.3 11/29/2017 06:57 PM    GLOB 4.5 (H) 11/29/2017 06:57 PM    AGRAT 0.6 (L) 11/29/2017 06:57 PM    SGOT 18 11/29/2017 06:57 PM    ALT 21 11/29/2017 06:57 PM     CBC:   Lab Results   Component Value Date/Time    WBC 9.6 11/30/2017 05:33 AM    HGB 11.4 (L) 11/30/2017 05:33 AM    HCT 35.2 (L) 11/30/2017 05:33 AM     11/30/2017 05:33 AM     COAGS: No results found for: APTT, PTP, INR      Assessment/Plan:     Active Problems:    COPD (chronic obstructive pulmonary disease) ([address] (5/9/2017)      Hypothyroidism (5/9/2017)      Dementia without behavioral disturbance (5/9/2017)      Fall (11/28/2017)      Colovesical fistula (11/29/2017)      Abscess, abdomen ([address] (11/29/2017)      Acute diverticulitis of intestine (11/29/2017)

## 2017-11-30 NOTE — H&P
History and Physical    Patient: Aye Anguiano MRN: [id number]  SSN: [id number]    YOB: 1933  Age: 80 y.o. Sex: female      Subjective:    cc:\" Referred by primary MD due to abnormal CT scan\"    Patient is DNR. Aye Anguiano is a 80 y.o. female who has a PMH of COPD on home oxygen, dementia, Hypothyroidism who was brought in by her daughter after she was diagnosed with acute diverticulitis and intraabdominal abscess with colovesical fistula. This is the second time of this episode, since 1 year ago she was admitted at [facility] for sepsis after an intraabdominal infection. She was not a candidate for surgery and received long-term atb via PICC line. Ms Debbie Quigley used to be on hospice due to her COPD and dementia, but was recently discharged and send to an [facility]. Has had episodes of UTI, with recent trial of keflex 1 week ago. Currently she complains of dysuria, foul-smell in urine, decreased appetite, confusion and multiple episodes of falls according to her daughter. Denies vomiting, fever, chills, abdominal pain, diarrhea, melena, hematochezia. Upon arrival to ER VS /63  HR 98  T97F  O2: 91% room air. Pertinent labs: wbc 13k, HB 12, normal chemistry, normal creatinine. Low albumin, high alk phosphatase. EKG NSR,unspecif st changes. Abdomen/pelvis CT scan: Acute sigmoid diverticulitis with 2 separate abscess collections. Associated colovesical fistula. Brain ct: negative for acute pathology. Received cefepime/flagyl due to shortage of zosyn in the hospital. Lactic acid, ua pending. General surgeon informed and plan for no surgical procedure at the moment. Hospitalist was contacted for admission of this unfortunate patient with a diagnosis if acute diverticulitis and intraabdominal abscess with fistula to bladder. ROS: all pertinent described in my note.      Past Medical History:   Diagnosis Date    COPD (chronic obstructive pulmonary disease) ([facility]     Depression     HLD (hyperlipidemia)     Hypothyroidism     Nocturnal hypoxia      Past Surgical History:   Procedure Laterality Date    HX CHOLECYSTECTOMY        Family History   Problem Relation Age of Onset    Hypertension Mother     Heart Disease Father     Cancer Sister     Dementia Sister     Thyroid Disease Sister     Dementia Brother      Social History   Substance Use Topics    Smoking status: Former Smoker     Packs/day: 1.00     Years: 50.00    Smokeless tobacco: Never Used    Alcohol use 2.4 oz/week     4 Glasses of wine per week      Prior to Admission medications    Medication Sig Start Date End Date Taking? Authorizing Provider   cephALEXin (KEFLEX) 500 mg capsule Take 1 Cap by mouth two (2) times a day for 10 days. 11/28/17 12/8/17  Yelena Roberson MD   mrlzt-ok-2-dha-epa-phospho-ast (MEGARED OMEGA-3 KRILL OIL) 213-69-13-50 mg cap Take 300 mg by mouth daily. 8/7/17   Yelena Roberson MD   ergocalciferol, vitamin D2, 2,000 unit tab Take 2,000 Units by mouth daily. 8/7/17   Yelena Roberson MD   fluticasone-vilanterol (BREO ELLIPTA) 078-84 mcg/dose inhaler Take 1 Puff by inhalation daily. 7/31/17   Yelena Roberson MD   DULoxetine 40 mg cpDR Take 40 mg by mouth daily. 7/31/17   Yelena Roberson MD   albuterol (PROVENTIL HFA) 90 mcg/actuation inhaler Take 2 Puffs by inhalation every four (4) hours as needed for Wheezing or Shortness of Breath. Historical Provider   Benzocaine-Menthol (CHLORASEPTIC SORE THROAT) 6-10 mg lozg 1-2 Lozenges by Mucous Membrane route every four (4) hours as needed for Other (sore throat). Contracted Patient with [facility]. Using patient supply of home med. Historical Provider   ciclopirox (LOPROX) 0.77 % topical cream Apply 1 g to affected area two (2) times a day. To affected area bid. Contracted Patient with [facility]. Using patient supply of home med.     Historical Provider   memantine (NAMENDA) 10 mg tablet Take 10 mg by mouth two (2) times a day.    Historical Provider        No Known Allergies    Review of Systems:  A comprehensive review of systems was negative except for that written in the History of Present Illness. Objective:     Vitals:    11/29/17 1852   BP: 117/63   Pulse: 98   Resp: 20   Temp: 97.7 °F (36.5 °C)   SpO2: 91%   Weight: 63.5 kg (140 lb)   Height: 5' 3\" (1.6 m)        Physical Exam:  GENERAL: alert, cooperative, no distress, appears stated age  EYE: negative  LYMPHATIC: Cervical, supraclavicular, and axillary nodes normal.   THROAT & NECK: normal and no erythema or exudates noted. LUNG: clear to auscultation bilaterally  HEART: regular rate and rhythm, S1, S2 normal, no murmur, click, rub or gallop  ABDOMEN: soft, but tender at the LL quadrant. Bowel sounds present. No rebound tenderness. EXTREMITIES:  extremities normal, atraumatic, no cyanosis or edema  SKIN: Normal.  NEUROLOGIC: negative. PSYCHIATRIC: non focal    Assessment:     Hospital Problems  Date Reviewed: 11/29/2017          Codes Class Noted POA    Colovesical fistula ICD-10-CM: N32.1  ICD-9-CM: 596.1  11/29/2017 Yes        Abscess, abdomen ([facility] ICD-10-CM: K65.1  ICD-9-CM: 567.22  11/29/2017 Yes        Acute diverticulitis of intestine ICD-10-CM: K57.92  ICD-9-CM: 562.11  11/29/2017 Yes        Fall ICD-10-CM: D42. Chapis Larissa  ICD-9-CM: E888.9  11/28/2017 Yes        COPD (chronic obstructive pulmonary disease) ([facility] ICD-10-CM: J44.9  ICD-9-CM: 632  5/9/2017 Yes        Hypothyroidism ICD-10-CM: E03.9  ICD-9-CM: 244.9  5/9/2017 Yes        Dementia without behavioral disturbance ICD-10-CM: F03.90  ICD-9-CM: 294.20  5/9/2017 Yes              Plan: 1. Acute diverticulitis with intraabdominal abscess and colovesical fistula: no signs of peritonitis currently   2. History of falls  3. COPD on home oxygen, not in exacerbation  4. Dementia  5. Hypothyroidism, not in therapy     Plan:    -Admit under medicine  -Keep NPO for now  -Start normal saline 75 ml/hr  -maxipime 2gr iv q 12hr and flagyl 1gr iv q 12hrs  -pain control: morphine  -tylenol of fever  -Nausea and vomiting: zofran  -Resume namenda, albuterol neb, advair   -Monitor cbc, chem in am  -F/U lactic acid, UA  -Check TSH, she has been having confusion, falls, weakness   -General surgery consult  -IR for abscess drainage tomorrow   -PT/OT  -DVT ppx: heparin sq, GCS  -Place PPD    Advance directives: DNR  Goals of care discussed with daughter and patient  Estimated LOS>2 MN  Estimated DC planning: nursing home / [facility]  Risk: moderate-high      Signed By: Dinah Brown MD     November 29, 2017

## 2017-11-30 NOTE — PROGRESS NOTES
Patient to room. Oriented to room and call light. Verbalized and demonstrated understanding. Dual skin assessment performed by this RN and JUAN MANUEL Maddox. No skin breakdown noted.

## 2017-11-30 NOTE — INTERDISCIPLINARY ROUNDS
Interdisciplinary team rounds were held 11/30/2017 with the following team members:Care Management, Nursing, Physician, Pharmacy, and Physical Therapist.    Plan of care discussed. See clinical pathway and/or care plan for interventions and desired outcomes.

## 2017-11-30 NOTE — PROGRESS NOTES
Confirmed with [facility] that they are not able to handle a colostomy in and assisted living level of care. Went to discuss with family but they had left the floor. Will follow up in the am.   Aliyah Cantor.  Vy Goldberg

## 2017-11-30 NOTE — ED NOTES
TRANSFER - OUT REPORT:    Verbal report given to [facility] on Ronny Chino  being transferred to [facility] for routine progression of care       Report consisted of patients Situation, Background, Assessment and   Recommendations(SBAR). Information from the following report(s) SBAR, Kardex, ED Summary, MAR, Recent Results and Med Rec Status was reviewed with the receiving nurse. Lines:   Peripheral IV 11/29/17 Right Antecubital (Active)   Site Assessment Clean, dry, & intact 11/29/2017  6:54 PM   Phlebitis Assessment 0 11/29/2017  6:54 PM   Infiltration Assessment 0 11/29/2017  6:54 PM   Dressing Status Clean, dry, & intact 11/29/2017  6:54 PM   Dressing Type Transparent 11/29/2017  6:54 PM       Peripheral IV 11/29/17 Left Antecubital (Active)   Site Assessment Clean, dry, & intact 11/29/2017  8:09 PM   Phlebitis Assessment 0 11/29/2017  8:09 PM   Infiltration Assessment 0 11/29/2017  8:09 PM        Opportunity for questions and clarification was provided.       Patient transported with:   [facility]

## 2017-11-30 NOTE — PROGRESS NOTES
Problem: Mobility Impaired (Adult and Pediatric)  Goal: *Acute Goals and Plan of Care (Insert Text)  1. Ms. De Guzman will perform supine to sit and sit to supine independently in 5 days. 2.  Ms. De Guzman will perform sit to stand and bed to chair independently in 5 days. 3.  Ms. De Guzman will perform gait with rolling walker 300 ft independently in 5 days. PHYSICAL THERAPY: Initial Assessment 11/30/2017  INPATIENT: Hospital Day: 2  [place] / [place] / Product Type: Medicare /      NAME/AGE/GENDER: Sam Leonardo is a 80 y.o. female   PRIMARY DIAGNOSIS: Acute diverticulitis of intestine  Abscess, abdomen ([address]  Colovesical fistula Acute diverticulitis of intestine Acute diverticulitis of intestine        ICD-10: Treatment Diagnosis:   · Generalized Muscle Weakness (M62.81)  · Difficulty in walking, Not elsewhere classified (R26.2)   Precaution/Allergies:  Review of patient's allergies indicates no known allergies. ASSESSMENT:     Ms. Lopez presents with decreased mobility and decreased gait however it is not clear how far from baseline she is. She is quite pleasant and confused. She is at a contact guard level for all mobility with use of walker however she consistently had the walker too far out in front of her. Even with verbal cues she may correct for an instant but it doesn't last.  Understand from chart she has been falling a lot although she tells me no. From chart it also looks like she leaves her walker behind. Ms. Lopez is appropriate for skilled PT to maximize her rehab potential.  Ms. Lopez was on 2 liters of oxygen. She uses at night however today on RA with gait sat was 88%. This section established at most recent assessment   PROBLEM LIST (Impairments causing functional limitations):  1. Decreased Transfer Abilities  2. Decreased Ambulation Ability/Technique  3.  Decreased Activity Tolerance   INTERVENTIONS PLANNED: (Benefits and precautions of physical therapy have been discussed with the patient.)  1. Bed Mobility  2. Gait Training  3. Therapeutic Activites  4. Transfer Training  5. Group Therapy     TREATMENT PLAN: Frequency/Duration: 3 times a week for duration of hospital stay  Rehabilitation Potential For Stated Goals: Good     RECOMMENDED REHABILITATION/EQUIPMENT: (at time of discharge pending progress): Due to the probability of continued deficits (see above) this patient will likely need continued skilled physical therapy after discharge. Equipment:    None at this time              HISTORY:   History of Present Injury/Illness (Reason for Referral):  Evans Blood is a 80 y.o. female who has a PMH of COPD on home oxygen, dementia, Hypothyroidism who was brought in by her daughter after she was diagnosed with acute diverticulitis and intraabdominal abscess with colovesical fistula. This is the second time of this episode, since 1 year ago she was admitted at [facility] for sepsis after an intraabdominal infection. She was not a candidate for surgery and received long-term atb via PICC line. Ms Asher Edge used to be on hospice due to her COPD and dementia, but was recently discharged and send to an [facility]. Has had episodes of UTI, with recent trial of keflex 1 week ago. Currently she complains of dysuria, foul-smell in urine, decreased appetite, confusion and multiple episodes of falls according to her daughter. Denies vomiting, fever, chills, abdominal pain, diarrhea, melena, hematochezia. Upon arrival to ER VS /63  HR 98  T97F  O2: 91% room air. Pertinent labs: wbc 13k, HB 12, normal chemistry, normal creatinine. Low albumin, high alk phosphatase. EKG NSR,unspecif st changes. Abdomen/pelvis CT scan: Acute sigmoid diverticulitis with 2 separate abscess collections. Associated colovesical fistula. Brain ct: negative for acute pathology. Received cefepime/flagyl due to shortage of zosyn in the hospital. Lactic acid, ua pending.   General surgeon informed and plan for no surgical procedure at the moment. Past Medical History/Comorbidities:   Ms. Magalie Holliday  has a past medical history of COPD (chronic obstructive pulmonary disease) ([address] Depression; HLD (hyperlipidemia); Hypothyroidism; and Nocturnal hypoxia. Ms. Magalie Holliday  has a past surgical history that includes cholecystectomy. Social History/Living Environment:   Home Environment: Assisted living  One/Two Story Residence: One story  Living Alone: No  Support Systems: Child(cesar), Assisted living  Patient Expects to be Discharged to[de-identified] Assisted living  Current DME Used/Available at Home: Walker, rolling  Prior Level of Function/Work/Activity:  Independent with walker in memory care unit. copd, dementia   Number of Personal Factors/Comorbidities that affect the Plan of Care: 1-2: MODERATE COMPLEXITY   EXAMINATION:   Most Recent Physical Functioning:   Gross Assessment:  AROM: Generally decreased, functional  Strength: Generally decreased, functional               Posture:  Posture (WDL): Within defined limits  Balance:  Sitting: Intact  Standing: Impaired; With support  Standing - Static: Fair  Standing - Dynamic : Fair Bed Mobility:  Rolling: Supervision  Supine to Sit: Supervision  Wheelchair Mobility:     Transfers:  Sit to Stand: Contact guard assistance  Stand to Sit: Contact guard assistance  Gait:     Base of Support: Widened  Step Length: Right shortened;Left shortened  Distance (ft): 80 Feet (ft)  Assistive Device: Walker, rolling  Ambulation - Level of Assistance: Contact guard assistance  Interventions: Verbal cues; Tactile cues; Safety awareness training;Manual cues      Body Structures Involved:  1. Muscles Body Functions Affected:  1. Movement Related Activities and Participation Affected:  1.  Mobility   Number of elements that affect the Plan of Care: 3: MODERATE COMPLEXITY   CLINICAL PRESENTATION:   Presentation: Evolving clinical presentation with changing clinical characteristics: MODERATE COMPLEXITY   CLINICAL DECISION MAKIN[address] [place] [id number] AM-PAC 6 Clicks   Basic Mobility Inpatient Short Form  How much difficulty does the patient currently have. .. Unable A Lot A Little None   1. Turning over in bed (including adjusting bedclothes, sheets and blankets)? [] 1   [] 2   [x] 3   [] 4   2. Sitting down on and standing up from a chair with arms ( e.g., wheelchair, bedside commode, etc.)   [] 1   [] 2   [x] 3   [] 4   3. Moving from lying on back to sitting on the side of the bed? [] 1   [] 2   [x] 3   [] 4   How much help from another person does the patient currently need. .. Total A Lot A Little None   4. Moving to and from a bed to a chair (including a wheelchair)? [] 1   [] 2   [x] 3   [] 4   5. Need to walk in hospital room? [] 1   [] 2   [x] 3   [] 4   6. Climbing 3-5 steps with a railing? [] 1   [] 2   [x] 3   [] 4   © 2007, Trustees of [address] [place] [address] [address] [id number], under license to [employer]. All rights reserved      Score:  Initial: 18 Most Recent: X (Date: -- )    Interpretation of Tool:  Represents activities that are increasingly more difficult (i.e. Bed mobility, Transfers, Gait). Score 24 23 22-20 19-15 14-10 9-7 6     Modifier CH CI CJ CK CL CM CN      ? Mobility - Walking and Moving Around:     - CURRENT STATUS: CK - 40%-59% impaired, limited or restricted    - GOAL STATUS: CK - 40%-59% impaired, limited or restricted    - D/C STATUS:  ---------------To be determined---------------  [place] / Product Type: Medicare /      Medical Necessity:     · Patient is expected to demonstrate progress in functional technique to increase independence with mobility and gait. .  Reason for Services/Other Comments:  · Patient continues to require present interventions due to patient's inability to function at baseline. .   Use of outcome tool(s) and clinical judgement create a POC that gives a: Questionable prediction of patient's progress: MODERATE COMPLEXITY            TREATMENT:   (In addition to Assessment/Re-Assessment sessions the following treatments were rendered)   Pre-treatment Symptoms/Complaints:  none  Pain: Initial:   Pain Intensity 1: 0  Post Session:  none     Assessment/Reassessment only, no treatment provided today    Braces/Orthotics/Lines/Etc:   · IV  · O2 Device: Room air (placed on 2 lpm)  Treatment/Session Assessment:    · Response to Treatment:  good  · Interdisciplinary Collaboration:   o Physical Therapist  · After treatment position/precautions:   o Up in chair  o Bed alarm/tab alert on  o Call light within reach  o RN notified   · Compliance with Program/Exercises: Will assess as treatment progresses. · Recommendations/Intent for next treatment session: \"Next visit will focus on advancements to more challenging activities and reduction in assistance provided\".   Total Treatment Duration:  PT Patient Time In/Time Out  Time In: 1100  Time Out: 1700 Keith Neely PT

## 2017-11-30 NOTE — PROGRESS NOTES
Hospitalist Progress Note    2017  Admit Date: 2017  6:53 PM   NAME: Indy Davila   :  1933   MRN:  [id number]   Attending: Judge Akua MD  PCP:  Oma Hamilton MD    SUBJECTIVE:     Indy Davila is a 80CYL with COPD on home 2L O2, dementia, hypothyroidism who was admitted  with acute diverticulitis and intra-abdominal abscess with colovesical fistula. She had a similar admission about a year ago in [facility] and required longterm abx.     : reports improvement in abd pain and nausea. Feeling better today. No complaints. Denies SOB, CP. Review of Systems negative with exception of pertinent positives noted above      PHYSICAL EXAM       Visit Vitals    /72 (BP 1 Location: Right arm, BP Patient Position: At rest)    Pulse 95    Temp 97.4 °F (36.3 °C)    Resp 18    Ht 5' 3\" (1.6 m)    Wt 63.5 kg (140 lb)    SpO2 (!) 86%    BMI 24.8 kg/m2      Temp (24hrs), Av.8 °F (36.6 °C), Min:97.4 °F (36.3 °C), Max:98.4 °F (36.9 °C)    Oxygen Therapy  O2 Sat (%): (!) 86 % (17 0737)  Pulse via Oximetry: 95 beats per minute (17 0737)  O2 Device: Room air (placed on 2 lpm) (17 0737)    Intake/Output Summary (Last 24 hours) at 17 1033  Last data filed at 17 0900   Gross per 24 hour   Intake              574 ml   Output                0 ml   Net              574 ml      General: No acute distress. Head:  Atraumatic Normocephalic. Lungs:  CTA Bilaterally. CVS:  RRR  Abdomen: Soft, ND, TTP LLQ without rebound or guarding, +NABS  MSK:  Spontaneously moves extremities.   Neurologic:  No focal deficits      Recent Results (from the past 24 hour(s))   CBC WITH AUTOMATED DIFF    Collection Time: 17  6:57 PM   Result Value Ref Range    WBC 13.0 (H) 4.3 - 11.1 K/uL    RBC 4.34 4.05 - 5.25 M/uL    HGB 12.7 11.7 - 15.4 g/dL    HCT 38.6 35.8 - 46.3 %    MCV 88.9 79.6 - 97.8 FL    MCH 29.3 26.1 - 32.9 PG    MCHC 32.9 31.4 - 35.0 g/dL    RDW 13.6 11.9 - 14.6 %    PLATELET 997 439 - 173 K/uL    MPV 10.0 (L) 10.8 - 14.1 FL    DF AUTOMATED      NEUTROPHILS 78 43 - 78 %    LYMPHOCYTES 15 13 - 44 %    MONOCYTES 6 4.0 - 12.0 %    EOSINOPHILS 1 0.5 - 7.8 %    BASOPHILS 0 0.0 - 2.0 %    IMMATURE GRANULOCYTES 0 0.0 - 5.0 %    ABS. NEUTROPHILS 10.2 (H) 1.7 - 8.2 K/UL    ABS. LYMPHOCYTES 1.9 0.5 - 4.6 K/UL    ABS. MONOCYTES 0.7 0.1 - 1.3 K/UL    ABS. EOSINOPHILS 0.1 0.0 - 0.8 K/UL    ABS. BASOPHILS 0.0 0.0 - 0.2 K/UL    ABS. IMM. GRANS. 0.0 0.0 - 0.5 K/UL   METABOLIC PANEL, COMPREHENSIVE    Collection Time: 11/29/17  6:57 PM   Result Value Ref Range    Sodium 136 136 - 145 mmol/L    Potassium 4.0 3.5 - 5.1 mmol/L    Chloride 99 98 - 107 mmol/L    CO2 27 21 - 32 mmol/L    Anion gap 10 7 - 16 mmol/L    Glucose 92 65 - 100 mg/dL    BUN 18 8 - 23 MG/DL    Creatinine 0.71 0.6 - 1.0 MG/DL    GFR est AA >60 >60 ml/min/1.73m2    GFR est non-AA >60 >60 ml/min/1.73m2    Calcium 8.7 8.3 - 10.4 MG/DL    Bilirubin, total 0.6 0.2 - 1.1 MG/DL    ALT (SGPT) 21 12 - 65 U/L    AST (SGOT) 18 15 - 37 U/L    Alk. phosphatase 146 (H) 50 - 136 U/L    Protein, total 7.3 6.3 - 8.2 g/dL    Albumin 2.8 (L) 3.2 - 4.6 g/dL    Globulin 4.5 (H) 2.3 - 3.5 g/dL    A-G Ratio 0.6 (L) 1.2 - 3.5     LIPASE    Collection Time: 11/29/17  6:57 PM   Result Value Ref Range    Lipase 110 73 - 393 U/L   PROCALCITONIN    Collection Time: 11/29/17  6:57 PM   Result Value Ref Range    Procalcitonin 0.1 ng/mL   MAGNESIUM    Collection Time: 11/29/17  6:57 PM   Result Value Ref Range    Magnesium 2.2 1.8 - 2.4 mg/dL   EKG, 12 LEAD, INITIAL    Collection Time: 11/29/17  7:23 PM   Result Value Ref Range    Ventricular Rate 87 BPM    Atrial Rate 87 BPM    P-R Interval 156 ms    QRS Duration 70 ms    Q-T Interval 336 ms    QTC Calculation (Bezet) 404 ms    Calculated P Axis 81 degrees    Calculated R Axis 67 degrees    Calculated T Axis 69 degrees    Diagnosis       !! AGE AND GENDER SPECIFIC ECG ANALYSIS !!   Normal sinus rhythm  Septal infarct , age undetermined  Abnormal ECG  No previous ECGs available  Confirmed by ST ROSEMARY FRIAS MD (), ERWIN PARK ([id number]) on 11/29/2017 9:50:46 PM     POC LACTIC ACID    Collection Time: 11/29/17  7:40 PM   Result Value Ref Range    Lactic Acid (POC) 0.7 0.5 - 1.9 mmol/L   POC LACTIC ACID    Collection Time: 11/29/17  9:03 PM   Result Value Ref Range    Lactic Acid (POC) 1.3 0.5 - 1.9 mmol/L   URINALYSIS W/ RFLX MICROSCOPIC    Collection Time: 11/29/17 10:26 PM   Result Value Ref Range    Color YELLOW      Appearance CLEAR      Specific gravity 1.096 (H) 1.001 - 1.023      pH (UA) 5.0 5.0 - 9.0      Protein TRACE (A) NEG mg/dL    Glucose NEGATIVE  mg/dL    Ketone NEGATIVE  NEG mg/dL    Bilirubin NEGATIVE  NEG      Blood SMALL AMOUNT (A) NEG      Urobilinogen 1.0 0.2 - 1.0 EU/dL    Nitrites NEGATIVE  NEG      Leukocyte Esterase SMALL AMOUNT (A) NEG     URINE MICROSCOPIC    Collection Time: 11/29/17 10:26 PM   Result Value Ref Range    WBC 3-5 0 /hpf    RBC 5-10 0 /hpf    Epithelial cells 0-3 0 /hpf    Bacteria 0 0 /hpf    Casts 0 0 /lpf    Crystals, urine 0 0 /LPF    Mucus 0 0 /lpf    Other observations RESULTS VERIFIED MANUALLY     CULTURE, URINE    Collection Time: 11/29/17 10:27 PM   Result Value Ref Range    Special Requests: NO SPECIAL REQUESTS      Culture result:        NO GROWTH AFTER SHORT PERIOD OF INCUBATION. FURTHER RESULTS TO FOLLOW AFTER OVERNIGHT INCUBATION.    CBC WITH AUTOMATED DIFF    Collection Time: 11/30/17  5:33 AM   Result Value Ref Range    WBC 9.6 4.3 - 11.1 K/uL    RBC 3.99 (L) 4.05 - 5.25 M/uL    HGB 11.4 (L) 11.7 - 15.4 g/dL    HCT 35.2 (L) 35.8 - 46.3 %    MCV 88.2 79.6 - 97.8 FL    MCH 28.6 26.1 - 32.9 PG    MCHC 32.4 31.4 - 35.0 g/dL    RDW 13.5 11.9 - 14.6 %    PLATELET 518 044 - 185 K/uL    MPV 9.7 (L) 10.8 - 14.1 FL    DF AUTOMATED      NEUTROPHILS 75 43 - 78 %    LYMPHOCYTES 18 13 - 44 %    MONOCYTES 6 4.0 - 12.0 %    EOSINOPHILS 1 0.5 - 7.8 %    BASOPHILS 0 0.0 - 2.0 %    IMMATURE GRANULOCYTES 0 0.0 - 5.0 %    ABS. NEUTROPHILS 7.2 1.7 - 8.2 K/UL    ABS. LYMPHOCYTES 1.7 0.5 - 4.6 K/UL    ABS. MONOCYTES 0.6 0.1 - 1.3 K/UL    ABS. EOSINOPHILS 0.1 0.0 - 0.8 K/UL    ABS. BASOPHILS 0.0 0.0 - 0.2 K/UL    ABS. IMM. GRANS. 0.0 0.0 - 0.5 K/UL   METABOLIC PANEL, BASIC    Collection Time: 11/30/17  5:33 AM   Result Value Ref Range    Sodium 140 136 - 145 mmol/L    Potassium 3.9 3.5 - 5.1 mmol/L    Chloride 104 98 - 107 mmol/L    CO2 24 21 - 32 mmol/L    Anion gap 12 7 - 16 mmol/L    Glucose 82 65 - 100 mg/dL    BUN 14 8 - 23 MG/DL    Creatinine 0.52 (L) 0.6 - 1.0 MG/DL    GFR est AA >60 >60 ml/min/1.73m2    GFR est non-AA >60 >60 ml/min/1.73m2    Calcium 8.3 8.3 - 10.4 MG/DL   TSH 3RD GENERATION    Collection Time: 11/30/17  5:33 AM   Result Value Ref Range    TSH 1.540 0.358 - 3.740 uIU/mL         Imaging /Procedures /Studies   XR CHEST SNGL V   Final Result   IMPRESSION:      No focal pulmonary consolidation. ASSESSMENT      Hospital Problems as of 11/30/2017  Date Reviewed: 11/29/2017          Codes Class Noted - Resolved POA    Colovesical fistula ICD-10-CM: N32.1  ICD-9-CM: 596.1  11/29/2017 - Present Yes        Abscess, abdomen ([facility] ICD-10-CM: K65.1  ICD-9-CM: 567.22  11/29/2017 - Present Yes        Acute diverticulitis of intestine ICD-10-CM: K57.92  ICD-9-CM: 562.11  11/29/2017 - Present Yes        Fall ICD-10-CM: W19. Jimmye Rise  ICD-9-CM: E888.9  11/28/2017 - Present Yes        COPD (chronic obstructive pulmonary disease) ([facility] ICD-10-CM: J44.9  ICD-9-CM: 220  5/9/2017 - Present Yes        Hypothyroidism ICD-10-CM: E03.9  ICD-9-CM: 244.9  5/9/2017 - Present Yes        Dementia without behavioral disturbance ICD-10-CM: F03.90  ICD-9-CM: 294.20  5/9/2017 - Present Yes                  Plan:    - Acute diverticulitis with intra-abd abscess and colovesical fistula:  No peritoneal signs  Keep NPO until further plan develops  IR and surgery to evaluate today for abscess drainage vs surgical intervention  Continue cefepime and flagyl  Continue IVF  Symptomatic treatment with pain meds and anti-emetics    - Dementia:  Continue home namenda    - COPD on home O2:  Not in exacerbation  Continue breo and albuterol prn    - hx of hypothyroidism:  TSH within normal limits  Not currently on synthroid    Code: DNR  DVT Prophylaxis: Hep SQ  Dispo: PT/OT eval pending, PPD placed in case needs STR at discharge    Shalonda Brunson MD

## 2017-11-30 NOTE — PROGRESS NOTES
SW spoke with pt's daughter/emergency contact s/p interviewing pt for additional assessment clarification, due to pt's dementia dx. This 81 yo pt uncertain about DNR & POA status, reported that she lived with her spouse in a 1 BR house. Per daughter, pt has a DNR and daughter is pt's POA. Daughter reported that when pt's spouse  in 2017, pt went to live with pt's daughter in [facility] until 2017 when pt went to live in a 1 BR [facility]. Daughter reported that pt had been falling recently and daughter took pt to her PCP, Dr Leah Hill, on Tuesday, and then took pt to the ED. Plan: to contact [address] [place] [address] for baseline,PT results, etc.  Social Work to f/u . Care Management Interventions  PCP Verified by CM: Yes (SW spoke with daughter/ER contact who stated that ptt sees Dr Leah Hill @ [phone])  Transition of Care Consult (CM Consult): Discharge Planning, Long Term Care  Physical Therapy Consult: Yes  Current Support Network: Shanae discussed with Pt/Family/Caregiver: Yes ( spoke with pt's ER contact/Emil Cedillo @ [phone])  Freedom of Choice Offered: Yes  Discharge Location  Discharge Placement: Unable to determine at this time)    [facility] contacted - Aleks Pete, , reported that prior to pt's admission on 17, this pt was [facility] ambulatory. .(and should use her walker. ..but forgets to use her walker\").    Goal is to have pt ultimately  return to [facility] in [facility].

## 2017-11-30 NOTE — PROGRESS NOTES
Problem: Nutrition Deficit  Goal: *Optimize nutritional status  Nutrition  Reason for assessment: Referral received from nursing admission Malnutrition Screening Tool for recently lost 14-23# (15# in 2 months per ER MD notes) without trying and eating poorly due to decreased appetite. Assessment:   Diet order(s): NPO  Food/Nutrition History:  Hx of dementia and diverticular abscess. Now with acute sigmoid diverticulitis with 2 separate abscess collections. Pt unable to provide and diet or weight history. Anthropometrics: Height: 5' 3\" (160 cm), Weight: 63.5 kg (140 lb)-unknown source, Body mass index is 24.8 kg/(m^2). BMI class of normal weight. Weight hx per EMR ( Based on connect care functionality, RD cannot know if these weight are actual versus stated):    WT / BMI WEIGHT   7/31/2017 147 lb   5/9/2017 158 lb   ~9.7% change in wt within ~ 2 months c/w severe change. Macronutrient needs:   EER:  1539-6453 kcal /day (20-25 kcal/kg listed BW)   EPR:  63-78 grams protein/day (1.2-1.5 grams/kg IBW)  Intake/Comparative Standards: Current NPO does not meet kcal or protein needs     Nutrition Diagnosis: Inadequate oral intake r/t altered GI status and inability to consume oral intake as evidenced by NPO status, abscesses and fistula as above and  wt loss as above    Intervention:  Meals and snacks:  Await initiation and progression of p.o. diet as GI status allows. Nutrition supplement therapy: Ensure clear tid with clear liquids, then Ensure Enlive tid once diet is progressed to at least full liquids. PN: If it is expected that patient will be unable to tolerate p.o. diet greater than 55% of full liquid diet or greater within 5-3 days, consider TPN. If TPN is pursued, please order nutrition consult for TPN management. Discharge nutrition plan: Too soon to determine.     Josephine Mario, [address] [place] [address], [address] [place] [address]

## 2017-12-01 LAB
ANION GAP SERPL CALC-SCNC: 11 MMOL/L (ref 7–16)
BASOPHILS # BLD: 0 K/UL (ref 0–0.2)
BASOPHILS NFR BLD: 0 % (ref 0–2)
BUN SERPL-MCNC: 12 MG/DL (ref 8–23)
CALCIUM SERPL-MCNC: 8.4 MG/DL (ref 8.3–10.4)
CHLORIDE SERPL-SCNC: 105 MMOL/L (ref 98–107)
CO2 SERPL-SCNC: 25 MMOL/L (ref 21–32)
CREAT SERPL-MCNC: 0.52 MG/DL (ref 0.6–1)
DIFFERENTIAL METHOD BLD: ABNORMAL
EOSINOPHIL # BLD: 0 K/UL (ref 0–0.8)
EOSINOPHIL NFR BLD: 0 % (ref 0.5–7.8)
ERYTHROCYTE [DISTWIDTH] IN BLOOD BY AUTOMATED COUNT: 13.4 % (ref 11.9–14.6)
GLUCOSE SERPL-MCNC: 80 MG/DL (ref 65–100)
HCT VFR BLD AUTO: 34.7 % (ref 35.8–46.3)
HGB BLD-MCNC: 11.5 G/DL (ref 11.7–15.4)
IMM GRANULOCYTES # BLD: 0 K/UL (ref 0–0.5)
IMM GRANULOCYTES NFR BLD AUTO: 0 % (ref 0–5)
LYMPHOCYTES # BLD: 1.7 K/UL (ref 0.5–4.6)
LYMPHOCYTES NFR BLD: 19 % (ref 13–44)
MCH RBC QN AUTO: 29.1 PG (ref 26.1–32.9)
MCHC RBC AUTO-ENTMCNC: 33.1 G/DL (ref 31.4–35)
MCV RBC AUTO: 87.8 FL (ref 79.6–97.8)
MM INDURATION POC: 0 MM (ref 0–5)
MM INDURATION POC: NORMAL MM (ref 0–5)
MONOCYTES # BLD: 0.7 K/UL (ref 0.1–1.3)
MONOCYTES NFR BLD: 8 % (ref 4–12)
NEUTS SEG # BLD: 6.7 K/UL (ref 1.7–8.2)
NEUTS SEG NFR BLD: 73 % (ref 43–78)
PLATELET # BLD AUTO: 249 K/UL (ref 150–450)
PMV BLD AUTO: 9.8 FL (ref 10.8–14.1)
POTASSIUM SERPL-SCNC: 3.6 MMOL/L (ref 3.5–5.1)
PPD POC: NORMAL NEGATIVE
PPD POC: NORMAL NEGATIVE
RBC # BLD AUTO: 3.95 M/UL (ref 4.05–5.25)
SODIUM SERPL-SCNC: 141 MMOL/L (ref 136–145)
WBC # BLD AUTO: 9.1 K/UL (ref 4.3–11.1)

## 2017-12-01 PROCEDURE — 74011250636 HC RX REV CODE- 250/636: Performed by: INTERNAL MEDICINE

## 2017-12-01 PROCEDURE — 36415 COLL VENOUS BLD VENIPUNCTURE: CPT | Performed by: INTERNAL MEDICINE

## 2017-12-01 PROCEDURE — 74011000250 HC RX REV CODE- 250: Performed by: INTERNAL MEDICINE

## 2017-12-01 PROCEDURE — 85025 COMPLETE CBC W/AUTO DIFF WBC: CPT | Performed by: INTERNAL MEDICINE

## 2017-12-01 PROCEDURE — 97530 THERAPEUTIC ACTIVITIES: CPT

## 2017-12-01 PROCEDURE — 80048 BASIC METABOLIC PNL TOTAL CA: CPT | Performed by: INTERNAL MEDICINE

## 2017-12-01 PROCEDURE — 74011250637 HC RX REV CODE- 250/637: Performed by: INTERNAL MEDICINE

## 2017-12-01 PROCEDURE — 65270000029 HC RM PRIVATE

## 2017-12-01 PROCEDURE — 97165 OT EVAL LOW COMPLEX 30 MIN: CPT

## 2017-12-01 PROCEDURE — 94760 N-INVAS EAR/PLS OXIMETRY 1: CPT

## 2017-12-01 PROCEDURE — 74011000258 HC RX REV CODE- 258: Performed by: INTERNAL MEDICINE

## 2017-12-01 PROCEDURE — 94640 AIRWAY INHALATION TREATMENT: CPT

## 2017-12-01 RX ADMIN — ALBUTEROL SULFATE 2.5 MG: 2.5 SOLUTION RESPIRATORY (INHALATION) at 08:23

## 2017-12-01 RX ADMIN — ACETAMINOPHEN 650 MG: 325 TABLET ORAL at 21:05

## 2017-12-01 RX ADMIN — MEMANTINE HYDROCHLORIDE 10 MG: 5 TABLET ORAL at 17:48

## 2017-12-01 RX ADMIN — SODIUM CHLORIDE 75 ML/HR: 900 INJECTION, SOLUTION INTRAVENOUS at 23:37

## 2017-12-01 RX ADMIN — Medication 1 AMPULE: at 21:11

## 2017-12-01 RX ADMIN — METRONIDAZOLE 500 MG: 500 INJECTION, SOLUTION INTRAVENOUS at 23:31

## 2017-12-01 RX ADMIN — ALBUTEROL SULFATE 2.5 MG: 2.5 SOLUTION RESPIRATORY (INHALATION) at 19:27

## 2017-12-01 RX ADMIN — HEPARIN SODIUM 5000 UNITS: 5000 INJECTION, SOLUTION INTRAVENOUS; SUBCUTANEOUS at 21:04

## 2017-12-01 RX ADMIN — ALBUTEROL SULFATE 2.5 MG: 2.5 SOLUTION RESPIRATORY (INHALATION) at 01:21

## 2017-12-01 RX ADMIN — SODIUM CHLORIDE 2 G: 900 INJECTION, SOLUTION INTRAVENOUS at 21:04

## 2017-12-01 RX ADMIN — ALBUTEROL SULFATE 2.5 MG: 2.5 SOLUTION RESPIRATORY (INHALATION) at 13:23

## 2017-12-01 RX ADMIN — METRONIDAZOLE 500 MG: 500 INJECTION, SOLUTION INTRAVENOUS at 11:40

## 2017-12-01 RX ADMIN — Medication 1 AMPULE: at 08:47

## 2017-12-01 RX ADMIN — MEMANTINE HYDROCHLORIDE 10 MG: 5 TABLET ORAL at 08:46

## 2017-12-01 RX ADMIN — BUDESONIDE 500 MCG: 0.5 INHALANT RESPIRATORY (INHALATION) at 08:23

## 2017-12-01 RX ADMIN — HEPARIN SODIUM 5000 UNITS: 5000 INJECTION, SOLUTION INTRAVENOUS; SUBCUTANEOUS at 08:47

## 2017-12-01 RX ADMIN — BUDESONIDE 500 MCG: 0.5 INHALANT RESPIRATORY (INHALATION) at 19:27

## 2017-12-01 NOTE — PROGRESS NOTES
Hospitalist Progress Note    2017  Admit Date: 2017  6:53 PM   NAME: Dayna Fernández   :  1933   MRN:  [id number]   Attending: Phillip Jackson MD  PCP:  Alyssa King MD    SUBJECTIVE:     Dayna Fernández is a 69DQS with COPD on home 2L O2, dementia, hypothyroidism who was admitted  with acute diverticulitis and intra-abdominal abscess with colovesical fistula. She had a similar admission about a year ago in [facility] and required longterm abx.     : sleepy this morning. Abd pain appears to be improving. No nausea. Had fever overnight      Review of Systems negative with exception of pertinent positives noted above      PHYSICAL EXAM       Visit Vitals    /73 (BP 1 Location: Right arm, BP Patient Position: At rest)    Pulse 96    Temp 98 °F (36.7 °C)    Resp 18    Ht 5' 3\" (1.6 m)    Wt 63.5 kg (140 lb)    SpO2 94%    BMI 24.8 kg/m2      Temp (24hrs), Av.8 °F (37.1 °C), Min:98 °F (36.7 °C), Max:101.2 °F (38.4 °C)    Oxygen Therapy  O2 Sat (%): 94 % (17 0705)  Pulse via Oximetry: 100 beats per minute (17 0122)  O2 Device: Room air (17 0122)  O2 Flow Rate (L/min): 2 l/min (17 1357)    Intake/Output Summary (Last 24 hours) at 17 0749  Last data filed at 17 0900   Gross per 24 hour   Intake                0 ml   Output                0 ml   Net                0 ml      General: No acute distress. Head:  Atraumatic Normocephalic. Lungs:  CTA Bilaterally. CVS:  RRR  Abdomen: Soft, ND, mild TTP LLQ without rebound or guarding, +NABS  MSK:  Spontaneously moves extremities.   Neurologic:  No focal deficits      Recent Results (from the past 24 hour(s))   PLEASE READ & DOCUMENT PPD TEST IN 24 HRS    Collection Time: 17 10:00 PM   Result Value Ref Range    PPD  Negative    mm Induration 0 mm   METABOLIC PANEL, BASIC    Collection Time: 17  5:16 AM   Result Value Ref Range    Sodium 141 136 - 145 mmol/L    Potassium 3.6 3.5 - 5.1 mmol/L    Chloride 105 98 - 107 mmol/L    CO2 25 21 - 32 mmol/L    Anion gap 11 7 - 16 mmol/L    Glucose 80 65 - 100 mg/dL    BUN 12 8 - 23 MG/DL    Creatinine 0.52 (L) 0.6 - 1.0 MG/DL    GFR est AA >60 >60 ml/min/1.73m2    GFR est non-AA >60 >60 ml/min/1.73m2    Calcium 8.4 8.3 - 10.4 MG/DL   CBC WITH AUTOMATED DIFF    Collection Time: 12/01/17  5:16 AM   Result Value Ref Range    WBC 9.1 4.3 - 11.1 K/uL    RBC 3.95 (L) 4.05 - 5.25 M/uL    HGB 11.5 (L) 11.7 - 15.4 g/dL    HCT 34.7 (L) 35.8 - 46.3 %    MCV 87.8 79.6 - 97.8 FL    MCH 29.1 26.1 - 32.9 PG    MCHC 33.1 31.4 - 35.0 g/dL    RDW 13.4 11.9 - 14.6 %    PLATELET 848 539 - 866 K/uL    MPV 9.8 (L) 10.8 - 14.1 FL    DF AUTOMATED      NEUTROPHILS 73 43 - 78 %    LYMPHOCYTES 19 13 - 44 %    MONOCYTES 8 4.0 - 12.0 %    EOSINOPHILS 0 (L) 0.5 - 7.8 %    BASOPHILS 0 0.0 - 2.0 %    IMMATURE GRANULOCYTES 0 0.0 - 5.0 %    ABS. NEUTROPHILS 6.7 1.7 - 8.2 K/UL    ABS. LYMPHOCYTES 1.7 0.5 - 4.6 K/UL    ABS. MONOCYTES 0.7 0.1 - 1.3 K/UL    ABS. EOSINOPHILS 0.0 0.0 - 0.8 K/UL    ABS. BASOPHILS 0.0 0.0 - 0.2 K/UL    ABS. IMM. GRANS. 0.0 0.0 - 0.5 K/UL         Imaging /Procedures /Studies   XR CHEST SNGL V   Final Result   IMPRESSION:      No focal pulmonary consolidation. ASSESSMENT      Hospital Problems as of 12/1/2017  Date Reviewed: 11/29/2017          Codes Class Noted - Resolved POA    Colovesical fistula ICD-10-CM: N32.1  ICD-9-CM: 596.1  11/29/2017 - Present Yes        Abscess, abdomen ([address] ICD-10-CM: K65.1  ICD-9-CM: 567.22  11/29/2017 - Present Yes        * (Principal)Acute diverticulitis of intestine ICD-10-CM: K57.92  ICD-9-CM: 562.11  11/29/2017 - Present Yes        Fall ICD-10-CM: W19. Axel Grief  ICD-9-CM: E888.9  11/28/2017 - Present Yes        COPD (chronic obstructive pulmonary disease) ([email or web address] ICD-10-CM: J44.9  ICD-9-CM: 693  5/9/2017 - Present Yes        Hypothyroidism ICD-10-CM: E03.9  ICD-9-CM: 244.9  5/9/2017 - Present Yes        Dementia without behavioral disturbance ICD-10-CM: F03.90  ICD-9-CM: 294.20  5/9/2017 - Present Yes                  Plan:    - Acute diverticulitis with intra-abd abscess and colovesical fistula:  No peritoneal signs  Keep NPO unless ok with surgery to advance diet  Surgery following and discussing possible surgical options with family  Continue cefepime and flagyl  Continue IVF  Symptomatic treatment with pain meds and anti-emetics    - Dementia:  Continue home namenda    - COPD on home O2:  Not in exacerbation  Continue breo and albuterol prn    - hx of hypothyroidism:  TSH within normal limits  Not currently on synthroid    Code: DNR  DVT Prophylaxis: Hep SQ  Dispo: PT/OT eval pending, PPD placed in case needs STR at discharge    [employer], MD

## 2017-12-01 NOTE — PROGRESS NOTES
Department of Interventional Radiology  [phone]                                 Progress Note  Patient: lEaine Taylor MRN: [id number]  SSN: [id number]    YOB: 1933  Age: 80 y.o. Sex: female           I had a detailed conversation with the patient's daughter:  Ana Suggs ([phone]) concerning treatment options for her mother. I explained that given the small size, and location of the abscess, that drain placement would be quite difficult, and small bowel injury is a real risk. She is in agreement that this should not be pursued. At this point, neither she nor her mother, want any surgery or procedure. Additionally, we have learned that the referring SNF will not accept a patient with a colostomy or with an indwelling drain. This leaves a conservative approach as the best option. She is agreeable to continuing antibiotics, as necessary. IR will sign off. Please call with any questions.     David Prieto MD           Objective:     Vitals:    12/01/17 0315 12/01/17 0705 12/01/17 0823 12/01/17 1048   BP: 149/77 118/73  123/71   Pulse: 99 96  98   Resp: 18 18  18   Temp: 98.2 °F (36.8 °C) 98 °F (36.7 °C)  98.4 °F (36.9 °C)   SpO2: 92% 94% 94% 91%   Weight:       Height:

## 2017-12-01 NOTE — PROGRESS NOTES
Problem: Mobility Impaired (Adult and Pediatric)  Goal: *Acute Goals and Plan of Care (Insert Text)  1. Ms. De Guzman will perform supine to sit and sit to supine independently in 5 days. 2.  Ms. De Guzman will perform sit to stand and bed to chair independently in 5 days. 3.  Ms. De Guzman will perform gait with rolling walker 300 ft independently in 5 days. PHYSICAL THERAPY: Daily Note, Treatment Day: 1st, AM 12/1/2017  INPATIENT: Hospital Day: 3  [place] / Product Type: Medicare /      NAME/AGE/GENDER: Meseret Zelaya is a 80 y.o. female   PRIMARY DIAGNOSIS: Acute diverticulitis of intestine  Abscess, abdomen ([address]  Colovesical fistula Acute diverticulitis of intestine Acute diverticulitis of intestine        ICD-10: Treatment Diagnosis:   · Generalized Muscle Weakness (M62.81)  · Difficulty in walking, Not elsewhere classified (R26.2)   Precaution/Allergies:  Review of patient's allergies indicates no known allergies. ASSESSMENT:     Ms. Smith Sanchez presents sitting at edge of bed finishing OT assessment. She is agreeable to therapy treatment/ambulation in hallway. Performs transfers with supervision. Ambulates 100 ft in hallway with walker and SBA-supervision with min verbal cueing for safety. Does better than yesterday with staying close to walker and appears fairly steady. Able to progress gait distance slightly. C/o fatigue towards the end. Agreeable with encouragement to sitting in recliner after activity however declines LE exercises politely but firmly. Left with alarm in place and needs in reach. Appears to be functioning close to baseline at this time and hopeful to return to her assisted living facility. This section established at most recent assessment   PROBLEM LIST (Impairments causing functional limitations):  1. Decreased Transfer Abilities  2. Decreased Ambulation Ability/Technique  3.  Decreased Activity Tolerance   INTERVENTIONS PLANNED: (Benefits and precautions of physical therapy have been discussed with the patient.)  1. Bed Mobility  2. Gait Training  3. Therapeutic Activites  4. Transfer Training  5. Group Therapy     TREATMENT PLAN: Frequency/Duration: 3 times a week for duration of hospital stay  Rehabilitation Potential For Stated Goals: Good     RECOMMENDED REHABILITATION/EQUIPMENT: (at time of discharge pending progress): Due to the probability of continued deficits (see above) this patient will likely need continued skilled physical therapy after discharge. Equipment:    None at this time              HISTORY:   History of Present Injury/Illness (Reason for Referral):  Jo Aranda is a 80 y.o. female who has a PMH of COPD on home oxygen, dementia, Hypothyroidism who was brought in by her daughter after she was diagnosed with acute diverticulitis and intraabdominal abscess with colovesical fistula. This is the second time of this episode, since 1 year ago she was admitted at [facility] for sepsis after an intraabdominal infection. She was not a candidate for surgery and received long-term atb via PICC line. Ms Deidre Roldan used to be on hospice due to her COPD and dementia, but was recently discharged [facility]. Has had episodes of UTI, with recent trial of keflex 1 week ago. Currently she complains of dysuria, foul-smell in urine, decreased appetite, confusion and multiple episodes of falls according to her daughter. Denies vomiting, fever, chills, abdominal pain, diarrhea, melena, hematochezia. Upon arrival to ER VS /63  HR 98  T97F  O2: 91% room air. Pertinent labs: wbc 13k, HB 12, normal chemistry, normal creatinine. Low albumin, high alk phosphatase. EKG NSR,unspecif st changes. Abdomen/pelvis CT scan: Acute sigmoid diverticulitis with 2 separate abscess collections. Associated colovesical fistula. Brain ct: negative for acute pathology.   Received cefepime/flagyl due to shortage of zosyn in the hospital. Lactic acid, ua pending. General surgeon informed and plan for no surgical procedure at the moment. Past Medical History/Comorbidities:   Ms. Nereida Mccann  has a past medical history of COPD (chronic obstructive pulmonary disease) ([address] Depression; HLD (hyperlipidemia); Hypothyroidism; and Nocturnal hypoxia. Ms. Nereida Mccann  has a past surgical history that includes cholecystectomy. Social History/Living Environment:   Home Environment: Assisted living  One/Two Story Residence: One story  Living Alone: No  Support Systems: Child(cesar), Assisted living  Patient Expects to be Discharged to[de-identified] Assisted living  Current DME Used/Available at Home: Walker, rolling  Prior Level of Function/Work/Activity:  Independent with walker in memory care unit. copd, dementia   Number of Personal Factors/Comorbidities that affect the Plan of Care: 1-2: MODERATE COMPLEXITY   EXAMINATION:   Most Recent Physical Functioning:   Gross Assessment:                  Posture:     Balance:  Sitting: Intact  Standing: Impaired  Standing - Static: Fair (+)  Standing - Dynamic : Fair (+) Bed Mobility:     Wheelchair Mobility:     Transfers:  Sit to Stand: Supervision  Stand to Sit: Supervision  Bed to Chair: Supervision  Interventions: Verbal cues; Safety awareness training  Duration: 16 Minutes  Gait:     Base of Support: Narrowed  Step Length: Left shortened;Right shortened  Distance (ft): 100 Feet (ft)  Assistive Device: Walker, rolling  Ambulation - Level of Assistance: Supervision  Interventions: Verbal cues; Safety awareness training      Body Structures Involved:  1. Muscles Body Functions Affected:  1. Movement Related Activities and Participation Affected:  1.  Mobility   Number of elements that affect the Plan of Care: 3: MODERATE COMPLEXITY   CLINICAL PRESENTATION:   Presentation: Evolving clinical presentation with changing clinical characteristics: MODERATE COMPLEXITY   CLINICAL DECISION MAKIN [employer] Mobility Inpatient Short Form  How much difficulty does the patient currently have. .. Unable A Lot A Little None   1. Turning over in bed (including adjusting bedclothes, sheets and blankets)? [] 1   [] 2   [x] 3   [] 4   2. Sitting down on and standing up from a chair with arms ( e.g., wheelchair, bedside commode, etc.)   [] 1   [] 2   [x] 3   [] 4   3. Moving from lying on back to sitting on the side of the bed? [] 1   [] 2   [x] 3   [] 4   How much help from another person does the patient currently need. .. Total A Lot A Little None   4. Moving to and from a bed to a chair (including a wheelchair)? [] 1   [] 2   [x] 3   [] 4   5. Need to walk in hospital room? [] 1   [] 2   [x] 3   [] 4   6. Climbing 3-5 steps with a railing? [] 1   [] 2   [x] 3   [] 4   © 2007, Trustees of [address] [place] [address], under license to [employer]. All rights reserved      Score:  Initial: 18 Most Recent: X (Date: -- )    Interpretation of Tool:  Represents activities that are increasingly more difficult (i.e. Bed mobility, Transfers, Gait). Score 24 23 22-20 19-15 14-10 9-7 6     Modifier CH CI CJ CK CL CM CN      ? Mobility - Walking and Moving Around:     - CURRENT STATUS: CK - 40%-59% impaired, limited or restricted    - GOAL STATUS: CK - 40%-59% impaired, limited or restricted    - D/C STATUS:  ---------------To be determined---------------  [place] / Product Type: Medicare /      Medical Necessity:     · Patient is expected to demonstrate progress in functional technique to increase independence with mobility and gait. .  Reason for Services/Other Comments:  · Patient continues to require present interventions due to patient's inability to function at baseline. .   Use of outcome tool(s) and clinical judgement create a POC that gives a: Questionable prediction of patient's progress: MODERATE COMPLEXITY            TREATMENT:   (In addition to Assessment/Re-Assessment sessions the following treatments were rendered)   Pre-treatment Symptoms/Complaints:  \"I want to take a nap\"  Pain: Initial:   Pain Intensity 1: 0  Post Session:  0/10 no complaints     Therapeutic Activity: (  16 Minutes ):  Therapeutic activities including Chair transfers and Ambulation on level ground to improve mobility, strength, balance and activity tolerance. Required minimal Verbal cues; Safety awareness training to promote dynamic balance in standing and promote motor control of bilateral, lower extremity(s). Braces/Orthotics/Lines/Etc:   · none  Treatment/Session Assessment:    · Response to Treatment:  Good progress with gait distance, safety  · Interdisciplinary Collaboration:   o Physical Therapist  o Registered Nurse  · After treatment position/precautions:   o Up in chair  o Bed alarm/tab alert on  o Bed in low position  o Call light within reach  o RN notified   · Compliance with Program/Exercises: Will assess as treatment progresses. · Recommendations/Intent for next treatment session: \"Next visit will focus on advancements to more challenging activities and reduction in assistance provided\".   Total Treatment Duration  PT Patient Time In/Time Out  Time In: 0951  Time Out: JUNIOR Dimas

## 2017-12-01 NOTE — PROGRESS NOTES
Consult Note   Simran Amezquita  MRN: [id number]  :1933  Age:84 y.o.    HPI: Simran Amezquita is a 80 y.o. female who has PMHx of COPD on home oxygen, dementia, Hypothyroidism who was brought in by her daughter after she was diagnosed with acute diverticulitis and intraabdominal abscess with colovesical fistula on CT by PCP. Pt has had two episodes in 1 year, as she was also admitted at [facility] for sepsis after an intraabdominal infection. She was not a candidate for surgery at that time and received long-term Abx via PICC line. Ms Raúl Ohara was previously on hospice due to her COPD and dementia, but was recently discharged and send to SNF. Information gleaned from chart review as pt does not have family at bedside currently and is pleasantly confused. Has had episodes of UTI X 6mo with recent trial of keflex 1 week ago. Pt's daughter reported that she complains of dysuria, foul-smell in urine, decreased appetite, confusion and multiple episodes of falls according to chart. Pt's daughter denies pt has had vomiting, fever, chills, abdominal pain, diarrhea, melena, hematochezia. Pt denies current abdominal pain and reports no changes in BMs. IR has seen pt and determined there is high risk of bowel perforation with attempted drainage of abscess therefore general surgery was consulted for surgical evaluation. WBC 9.6 today. AF, VSS. Pt is on vanc, flagyl, & cefepime. 17: Pt denies abdominal pain. Tmax 101.2, HR 110s, oxygenating well on room air. WBC 9.1 today. No family at bedside at time of exam.    CTAP 17:    FINDINGS:  Abdomen CT:  The lung bases are clear. There are no lesions in the liver or  spleen. The adrenal glands and pancreas appear normal.  There is normal  enhancement of the kidneys. There is no hydronephrosis. There is no adenopathy. There is mild diffuse prominence of stool pattern. There is no significant  small bowel distention.   There are no inflammatory changes or fluid collections  in the abdomen.     Pelvis CT: There is inflammation of the sigmoid colon consistent with acute  diverticulitis. There is a complex air-fluid collection inferior and posterior  to the proximal sigmoid colon extending into the mesentery, at least 5 cm in  length. There is a second separate complex fluid collection between the mid  sigmoid colon and the bladder, 4 cm in diameter. This involves the wall of the  bladder. There is air in the bladder lumen suggesting a colovesical fistula. There are calcified fibroids in the uterus. There is no significant pelvic  adenopathy. There are no bony lesions.     IMPRESSION: Acute sigmoid diverticulitis with 2 separate abscess collections. Associated colovesical fistula.       Past Medical History:   Diagnosis Date    COPD [facility]) [facility]     Depression     HLD (hyperlipidemia)     Hypothyroidism     Nocturnal hypoxia      Past Surgical History:   Procedure Laterality Date    HX CHOLECYSTECTOMY       Current Facility-Administered Medications   Medication Dose Route Frequency    albuterol (PROVENTIL HFA, VENTOLIN HFA, PROAIR HFA) inhaler 2 Puff  2 Puff Inhalation Q4H PRN    memantine (NAMENDA) tablet 10 mg  10 mg Oral BID    0.9% sodium chloride infusion  75 mL/hr IntraVENous CONTINUOUS    heparin (porcine) injection 5,000 Units  5,000 Units SubCUTAneous Q12H    ondansetron (ZOFRAN) injection 4 mg  4 mg IntraVENous Q8H PRN    acetaminophen (TYLENOL) tablet 650 mg  650 mg Oral Q6H PRN    morphine injection 1 mg  1 mg IntraVENous Q4H PRN    metroNIDAZOLE (FLAGYL) IVPB premix 500 mg  500 mg IntraVENous Q12H    budesonide (PULMICORT) 500 mcg/2 ml nebulizer suspension  500 mcg Nebulization BID RT    And    albuterol CONCENTRATE 2.5mg/0.5 mL neb soln  2.5 mg Nebulization Q6H RT    alcohol 62% (NOZIN) nasal  1 Ampule  1 Ampule Topical Q12H    cefepime (MAXIPIME) 2 g in 0.9% sodium chloride (MBP/ADV) 100 mL ADV  2 g IntraVENous Q24H     Review of patient's allergies indicates no known allergies. Social History     Social History    Marital status:      Spouse name: N/A    Number of children: N/A    Years of education: N/A     Social History Main Topics    Smoking status: Former Smoker     Packs/day: 1.00     Years: 50.00    Smokeless tobacco: Never Used    Alcohol use 2.4 oz/week     4 Glasses of wine per week    Drug use: No    Sexual activity: Not Currently     Partners: Male     Other Topics Concern    None     Social History Narrative     History   Smoking Status    Former Smoker    Packs/day: 1.00    Years: 50.00   Smokeless Tobacco    Never Used     Family History   Problem Relation Age of Onset   Renovo Shaker Hypertension Mother     Heart Disease Father     Cancer Sister     Dementia Sister     Thyroid Disease Sister     Dementia Brother        Physical Exam:   Visit Vitals    /71 (BP 1 Location: Right arm, BP Patient Position: At rest)    Pulse 98    Temp 98.4 °F (36.9 °C)    Resp 18    Ht 5' 3\" (1.6 m)    Wt 140 lb (63.5 kg)    SpO2 94%    BMI 24.8 kg/m2     Constitutional: Alert, disoriented to time and situation, cooperative patient in no acute distress; appears stated age    Eyes:Sclera are clear. EOMs intact  ENMT: no external lesions gross hearing normal; no obvious neck masses, no ear or lip lesions, nares normal  CV: RRR. Normal perfusion  Resp: No JVD. Breathing is  non-labored; no audible wheezing. GI: soft and slightly-distended; bowel sounds normoactive X 4; TTP LLQ  Musculoskeletal: unremarkable with normal function. No embolic signs or cyanosis.    Neuro:  Oriented; moves all 4; no focal deficits  Psychiatric: normal affect and mood, no memory impairment    Recent vitals (if inpt):  Patient Vitals for the past 24 hrs:   BP Temp Pulse Resp SpO2   12/01/17 1324 - - - - 94 %   12/01/17 1048 123/71 98.4 °F (36.9 °C) 98 18 91 %   12/01/17 0823 - - - - 94 %   12/01/17 0705 118/73 98 °F (36.7 °C) 96 18 94 %   12/01/17 0315 149/77 98.2 °F (36.8 °C) 99 18 92 %   12/01/17 0122 - - - - 90 %   11/30/17 2301 151/81 (!) 101.2 °F (38.4 °C) (!) 117 17 91 %   11/30/17 1926 - - - - 92 %   11/30/17 1449 112/59 98.1 °F (36.7 °C) (!) 105 18 95 %   11/30/17 1357 - - - - 97 %       Labs:  Recent Labs      12/01/17   0516   11/29/17   1857   WBC  9.1   < >  13.0*   HGB  11.5*   < >  12.7   PLT  249   < >  260   NA  141   < >  136   K  3.6   < >  4.0   CL  105   < >  99   CO2  25   < >  27   BUN  12   < >  18   CREA  0.52*   < >  0.71   GLU  80   < >  92   TBILI   --    --   0.6   SGOT   --    --   18   ALT   --    --   21   AP   --    --   146*   LPSE   --    --   110    < > = values in this interval not displayed. Lab Results   Component Value Date/Time    WBC 9.1 12/01/2017 05:16 AM    HGB 11.5 12/01/2017 05:16 AM    PLATELET 906 68/41/5574 05:16 AM    Sodium 141 12/01/2017 05:16 AM    Potassium 3.6 12/01/2017 05:16 AM    Chloride 105 12/01/2017 05:16 AM    CO2 25 12/01/2017 05:16 AM    BUN 12 12/01/2017 05:16 AM    Creatinine 0.52 12/01/2017 05:16 AM    Glucose 80 12/01/2017 05:16 AM    Bilirubin, total 0.6 11/29/2017 06:57 PM    AST (SGOT) 18 11/29/2017 06:57 PM    ALT (SGPT) 21 11/29/2017 06:57 PM    Alk. phosphatase 146 11/29/2017 06:57 PM    Lipase 110 11/29/2017 06:57 PM       I reviewed recent labs and recent radiologic studies. I independently reviewed radiology images for studies I described above or studies I have ordered.    Admission date (for inpatients): 11/29/2017   * No surgery found *  * No surgery found *    ASSESSMENT/PLAN:  Problem List  Date Reviewed: 11/29/2017          Codes Class Noted    Colovesical fistula ICD-10-CM: N32.1  ICD-9-CM: 596.1  11/29/2017        Abscess[facility] abdomen [facility] ICD-10-CM: K65.1  ICD-9-CM: 567.22  11/29/2017        * (Principal)Acute diverticulitis of intestine ICD-10-CM: K57.92  ICD-9-CM: 562.11  11/29/2017        Altered mental status, unspecified ICD-10-CM: R41.82  ICD-9-CM: 780.97  11/28/2017        Diverticulitis of large intestine with abscess without bleeding ICD-10-CM: K57.20  ICD-9-CM: 562.11, 569.5  11/28/2017        Dysuria ICD-10-CM: R30.0  ICD-9-CM: 788.1  11/28/2017        Fall ICD-10-CM: O47. Daune Koyanagi  ICD-9-CM: E888.9  11/28/2017        Onychomycosis due to dermatophyte ICD-10-CM: B35.1  ICD-9-CM: 110.1  7/31/2017        COPD (chronic obstructive pulmonary disease) [facility] ICD-10-CM: J44.9  ICD-9-CM: 714  5/9/2017        Depression ICD-10-CM: F32.9  ICD-9-CM: 827  5/9/2017        Hypothyroidism ICD-10-CM: E03.9  ICD-9-CM: 244.9  5/9/2017        Nocturnal hypoxia ICD-10-CM: G47.34  ICD-9-CM: 327.24  5/9/2017        Dementia without behavioral disturbance ICD-10-CM: F03.90  ICD-9-CM: 294.20  5/9/2017            Principal Problem:    Acute diverticulitis of intestine (11/29/2017)    Active Problems:    COPD (chronic obstructive pulmonary disease) ([facility] (5/9/2017)      Hypothyroidism (5/9/2017)      Dementia without behavioral disturbance (5/9/2017)      Fall (11/28/2017)      Colovesical fistula (11/29/2017)      Abscess, abdomen ([facility] (11/29/2017)       Plan:  Family does not wish for surgery at this time per RN notes. Dr. Philippa Eisenmenger will confirm via phone with daughter. General surgery to sign off. Please call if family's wishes changes.     Signed:  Norton Ganser, PA

## 2017-12-02 LAB
ANION GAP SERPL CALC-SCNC: 14 MMOL/L (ref 7–16)
BASOPHILS # BLD: 0 K/UL (ref 0–0.2)
BASOPHILS NFR BLD: 0 % (ref 0–2)
BUN SERPL-MCNC: 11 MG/DL (ref 8–23)
CALCIUM SERPL-MCNC: 8.6 MG/DL (ref 8.3–10.4)
CHLORIDE SERPL-SCNC: 107 MMOL/L (ref 98–107)
CO2 SERPL-SCNC: 21 MMOL/L (ref 21–32)
CREAT SERPL-MCNC: 0.39 MG/DL (ref 0.6–1)
DIFFERENTIAL METHOD BLD: ABNORMAL
EOSINOPHIL # BLD: 0.1 K/UL (ref 0–0.8)
EOSINOPHIL NFR BLD: 1 % (ref 0.5–7.8)
ERYTHROCYTE [DISTWIDTH] IN BLOOD BY AUTOMATED COUNT: 13.6 % (ref 11.9–14.6)
GLUCOSE SERPL-MCNC: 53 MG/DL (ref 65–100)
HCT VFR BLD AUTO: 34.1 % (ref 35.8–46.3)
HGB BLD-MCNC: 11 G/DL (ref 11.7–15.4)
IMM GRANULOCYTES # BLD: 0 K/UL (ref 0–0.5)
IMM GRANULOCYTES NFR BLD AUTO: 0 % (ref 0–5)
LYMPHOCYTES # BLD: 1.6 K/UL (ref 0.5–4.6)
LYMPHOCYTES NFR BLD: 19 % (ref 13–44)
MCH RBC QN AUTO: 28.4 PG (ref 26.1–32.9)
MCHC RBC AUTO-ENTMCNC: 32.3 G/DL (ref 31.4–35)
MCV RBC AUTO: 88.1 FL (ref 79.6–97.8)
MONOCYTES # BLD: 0.9 K/UL (ref 0.1–1.3)
MONOCYTES NFR BLD: 10 % (ref 4–12)
NEUTS SEG # BLD: 6.2 K/UL (ref 1.7–8.2)
NEUTS SEG NFR BLD: 70 % (ref 43–78)
PLATELET # BLD AUTO: 256 K/UL (ref 150–450)
PMV BLD AUTO: 9.9 FL (ref 10.8–14.1)
POTASSIUM SERPL-SCNC: 3.6 MMOL/L (ref 3.5–5.1)
RBC # BLD AUTO: 3.87 M/UL (ref 4.05–5.25)
SODIUM SERPL-SCNC: 142 MMOL/L (ref 136–145)
WBC # BLD AUTO: 8.9 K/UL (ref 4.3–11.1)

## 2017-12-02 PROCEDURE — 94760 N-INVAS EAR/PLS OXIMETRY 1: CPT

## 2017-12-02 PROCEDURE — 85025 COMPLETE CBC W/AUTO DIFF WBC: CPT | Performed by: INTERNAL MEDICINE

## 2017-12-02 PROCEDURE — 74011250636 HC RX REV CODE- 250/636: Performed by: INTERNAL MEDICINE

## 2017-12-02 PROCEDURE — 80048 BASIC METABOLIC PNL TOTAL CA: CPT | Performed by: INTERNAL MEDICINE

## 2017-12-02 PROCEDURE — 94640 AIRWAY INHALATION TREATMENT: CPT

## 2017-12-02 PROCEDURE — 74011000250 HC RX REV CODE- 250: Performed by: INTERNAL MEDICINE

## 2017-12-02 PROCEDURE — 65270000029 HC RM PRIVATE

## 2017-12-02 PROCEDURE — 74011000258 HC RX REV CODE- 258: Performed by: INTERNAL MEDICINE

## 2017-12-02 PROCEDURE — 36415 COLL VENOUS BLD VENIPUNCTURE: CPT | Performed by: INTERNAL MEDICINE

## 2017-12-02 PROCEDURE — 74011250637 HC RX REV CODE- 250/637: Performed by: INTERNAL MEDICINE

## 2017-12-02 RX ADMIN — HEPARIN SODIUM 5000 UNITS: 5000 INJECTION, SOLUTION INTRAVENOUS; SUBCUTANEOUS at 22:44

## 2017-12-02 RX ADMIN — ALBUTEROL SULFATE 2.5 MG: 2.5 SOLUTION RESPIRATORY (INHALATION) at 20:22

## 2017-12-02 RX ADMIN — SODIUM CHLORIDE 2 G: 900 INJECTION, SOLUTION INTRAVENOUS at 22:44

## 2017-12-02 RX ADMIN — BUDESONIDE 500 MCG: 0.5 INHALANT RESPIRATORY (INHALATION) at 07:16

## 2017-12-02 RX ADMIN — MEMANTINE HYDROCHLORIDE 10 MG: 5 TABLET ORAL at 17:28

## 2017-12-02 RX ADMIN — ALBUTEROL SULFATE 2.5 MG: 2.5 SOLUTION RESPIRATORY (INHALATION) at 07:16

## 2017-12-02 RX ADMIN — METRONIDAZOLE 500 MG: 500 INJECTION, SOLUTION INTRAVENOUS at 12:20

## 2017-12-02 RX ADMIN — MEMANTINE HYDROCHLORIDE 10 MG: 5 TABLET ORAL at 10:15

## 2017-12-02 RX ADMIN — Medication 1 AMPULE: at 22:45

## 2017-12-02 RX ADMIN — BUDESONIDE 500 MCG: 0.5 INHALANT RESPIRATORY (INHALATION) at 20:22

## 2017-12-02 RX ADMIN — ALBUTEROL SULFATE 2.5 MG: 2.5 SOLUTION RESPIRATORY (INHALATION) at 01:14

## 2017-12-02 RX ADMIN — HEPARIN SODIUM 5000 UNITS: 5000 INJECTION, SOLUTION INTRAVENOUS; SUBCUTANEOUS at 10:15

## 2017-12-02 RX ADMIN — Medication 1 AMPULE: at 10:17

## 2017-12-02 RX ADMIN — ALBUTEROL SULFATE 2.5 MG: 2.5 SOLUTION RESPIRATORY (INHALATION) at 15:10

## 2017-12-02 NOTE — PROGRESS NOTES
Hospitalist Progress Note    2017  Admit Date: 2017  6:53 PM   NAME: Indy Davila   :  1933   MRN:  [id number]   Attending: Judge Akua MD  PCP:  Oma Hamilton MD    SUBJECTIVE:   Patient presented  with acute diverticulitis and intra-abdominal abscess with colovesical fistula. On cefepime and flagyl. Had low grade temp overnight. Does not appear to have any surgical options. Pt denies any current abd pain, nausea, vomiting. States she just wants to sleep      Review of Systems negative with exception of pertinent positives noted above  PHYSICAL EXAM     Visit Vitals    /61 (BP 1 Location: Left arm, BP Patient Position: At rest)    Pulse 87    Temp 97.6 °F (36.4 °C)    Resp 18    Ht 5' 3\" (1.6 m)    Wt 63.5 kg (140 lb)    SpO2 90%    BMI 24.8 kg/m2      Temp (24hrs), Av.2 °F (36.8 °C), Min:97.2 °F (36.2 °C), Max:100.5 °F (38.1 °C)    Oxygen Therapy  O2 Sat (%): 90 % (17)  Pulse via Oximetry: 88 beats per minute (17)  O2 Device: Room air (17)  O2 Flow Rate (L/min): 2 l/min (17 1357)    Intake/Output Summary (Last 24 hours) at 17 0752  Last data filed at 17 1333   Gross per 24 hour   Intake             59.1 ml   Output                0 ml   Net             59.1 ml      General: No acute distress    Lungs:  CTA Bilaterally. Heart:  Regular rate and rhythm,  No murmur, rub, or gallop  Abdomen: Soft, Non distended, Non tender, Positive bowel sounds  Extremities: No cyanosis, clubbing or edema  Neurologic:  No focal deficits    ASSESSMENT      Active Hospital Problems    Diagnosis Date Noted    Colovesical fistula 2017    [facility]cess, abdomen ([facility] 2017    Acute diverticulitis of intestine 2017    Fall 2017    COPD (chronic obstructive pulmonary disease) ([facility] 2017    Hypothyroidism 2017    Dementia without behavioral disturbance 2017     Plan:  · Continue cefepime and flagyl. Pursuing conservative management at this time given surgical options and patient's baseline health. Follow cx. Consider ID consult pending results  · Continue ivf  · PT/OT  · zofran prn  · SW to aid with dispo options.       DVT Prophylaxis:     Signed By: Philomena Lira MD     December 2, 2017

## 2017-12-02 NOTE — PROGRESS NOTES
Daughter would like to speak with hospitalist. Hospitalist unavailable at this time. Has left building. Verbal orders received for clear liquids. hopsitalist will follow up with patient's family tomorrow.

## 2017-12-02 NOTE — PROGRESS NOTES
Hourly rounds complete this shift, Pt remains confused and tries to exit the bed to the bathroom without assistance.

## 2017-12-03 LAB
ANION GAP SERPL CALC-SCNC: 15 MMOL/L (ref 7–16)
BACTERIA SPEC CULT: ABNORMAL
BACTERIA SPEC CULT: ABNORMAL
BASOPHILS # BLD: 0 K/UL (ref 0–0.2)
BASOPHILS NFR BLD: 0 % (ref 0–2)
BUN SERPL-MCNC: 6 MG/DL (ref 8–23)
CALCIUM SERPL-MCNC: 8.4 MG/DL (ref 8.3–10.4)
CHLORIDE SERPL-SCNC: 106 MMOL/L (ref 98–107)
CO2 SERPL-SCNC: 21 MMOL/L (ref 21–32)
CREAT SERPL-MCNC: 0.46 MG/DL (ref 0.6–1)
DIFFERENTIAL METHOD BLD: ABNORMAL
EOSINOPHIL # BLD: 0 K/UL (ref 0–0.8)
EOSINOPHIL NFR BLD: 0 % (ref 0.5–7.8)
ERYTHROCYTE [DISTWIDTH] IN BLOOD BY AUTOMATED COUNT: 13.4 % (ref 11.9–14.6)
GLUCOSE SERPL-MCNC: 98 MG/DL (ref 65–100)
HCT VFR BLD AUTO: 35.1 % (ref 35.8–46.3)
HGB BLD-MCNC: 11.6 G/DL (ref 11.7–15.4)
IMM GRANULOCYTES # BLD: 0 K/UL (ref 0–0.5)
IMM GRANULOCYTES NFR BLD AUTO: 0 % (ref 0–5)
LYMPHOCYTES # BLD: 1.8 K/UL (ref 0.5–4.6)
LYMPHOCYTES NFR BLD: 20 % (ref 13–44)
MCH RBC QN AUTO: 28.8 PG (ref 26.1–32.9)
MCHC RBC AUTO-ENTMCNC: 33 G/DL (ref 31.4–35)
MCV RBC AUTO: 87.1 FL (ref 79.6–97.8)
MM INDURATION POC: NORMAL MM (ref 0–5)
MONOCYTES # BLD: 0.7 K/UL (ref 0.1–1.3)
MONOCYTES NFR BLD: 8 % (ref 4–12)
NEUTS SEG # BLD: 6.5 K/UL (ref 1.7–8.2)
NEUTS SEG NFR BLD: 72 % (ref 43–78)
PLATELET # BLD AUTO: 265 K/UL (ref 150–450)
PMV BLD AUTO: 9.8 FL (ref 10.8–14.1)
POTASSIUM SERPL-SCNC: 2.9 MMOL/L (ref 3.5–5.1)
PPD POC: NORMAL NEGATIVE
RBC # BLD AUTO: 4.03 M/UL (ref 4.05–5.25)
SERVICE CMNT-IMP: ABNORMAL
SODIUM SERPL-SCNC: 142 MMOL/L (ref 136–145)
WBC # BLD AUTO: 9.1 K/UL (ref 4.3–11.1)

## 2017-12-03 PROCEDURE — 94760 N-INVAS EAR/PLS OXIMETRY 1: CPT

## 2017-12-03 PROCEDURE — 74011000250 HC RX REV CODE- 250: Performed by: INTERNAL MEDICINE

## 2017-12-03 PROCEDURE — 74011250636 HC RX REV CODE- 250/636: Performed by: NURSE PRACTITIONER

## 2017-12-03 PROCEDURE — 85025 COMPLETE CBC W/AUTO DIFF WBC: CPT | Performed by: INTERNAL MEDICINE

## 2017-12-03 PROCEDURE — 74011250636 HC RX REV CODE- 250/636: Performed by: INTERNAL MEDICINE

## 2017-12-03 PROCEDURE — 94640 AIRWAY INHALATION TREATMENT: CPT

## 2017-12-03 PROCEDURE — 74011250637 HC RX REV CODE- 250/637: Performed by: INTERNAL MEDICINE

## 2017-12-03 PROCEDURE — 65270000029 HC RM PRIVATE

## 2017-12-03 PROCEDURE — 80048 BASIC METABOLIC PNL TOTAL CA: CPT | Performed by: INTERNAL MEDICINE

## 2017-12-03 PROCEDURE — 36415 COLL VENOUS BLD VENIPUNCTURE: CPT | Performed by: INTERNAL MEDICINE

## 2017-12-03 PROCEDURE — 74011000258 HC RX REV CODE- 258: Performed by: NURSE PRACTITIONER

## 2017-12-03 RX ORDER — LORAZEPAM 2 MG/ML
1 INJECTION INTRAMUSCULAR ONCE
Status: COMPLETED | OUTPATIENT
Start: 2017-12-03 | End: 2017-12-03

## 2017-12-03 RX ORDER — POTASSIUM CHLORIDE 14.9 MG/ML
20 INJECTION INTRAVENOUS ONCE
Status: COMPLETED | OUTPATIENT
Start: 2017-12-03 | End: 2017-12-03

## 2017-12-03 RX ORDER — HALOPERIDOL 5 MG/ML
4 INJECTION INTRAMUSCULAR
Status: DISCONTINUED | OUTPATIENT
Start: 2017-12-03 | End: 2017-12-13 | Stop reason: HOSPADM

## 2017-12-03 RX ADMIN — HEPARIN SODIUM 5000 UNITS: 5000 INJECTION, SOLUTION INTRAVENOUS; SUBCUTANEOUS at 08:57

## 2017-12-03 RX ADMIN — METRONIDAZOLE 500 MG: 500 INJECTION, SOLUTION INTRAVENOUS at 22:20

## 2017-12-03 RX ADMIN — HALOPERIDOL LACTATE 4 MG: 5 INJECTION, SOLUTION INTRAMUSCULAR at 02:39

## 2017-12-03 RX ADMIN — Medication 1 AMPULE: at 09:05

## 2017-12-03 RX ADMIN — ALBUTEROL SULFATE 2.5 MG: 2.5 SOLUTION RESPIRATORY (INHALATION) at 19:30

## 2017-12-03 RX ADMIN — METRONIDAZOLE 500 MG: 500 INJECTION, SOLUTION INTRAVENOUS at 00:43

## 2017-12-03 RX ADMIN — SODIUM CHLORIDE 75 ML/HR: 900 INJECTION, SOLUTION INTRAVENOUS at 12:02

## 2017-12-03 RX ADMIN — LORAZEPAM 1 MG: 2 INJECTION INTRAMUSCULAR; INTRAVENOUS at 05:15

## 2017-12-03 RX ADMIN — BUDESONIDE 500 MCG: 0.5 INHALANT RESPIRATORY (INHALATION) at 19:30

## 2017-12-03 RX ADMIN — POTASSIUM CHLORIDE 20 MEQ: 14.9 INJECTION, SOLUTION INTRAVENOUS at 07:19

## 2017-12-03 RX ADMIN — HEPARIN SODIUM 5000 UNITS: 5000 INJECTION, SOLUTION INTRAVENOUS; SUBCUTANEOUS at 22:24

## 2017-12-03 RX ADMIN — METRONIDAZOLE 500 MG: 500 INJECTION, SOLUTION INTRAVENOUS at 12:02

## 2017-12-03 RX ADMIN — SODIUM CHLORIDE 2 G: 900 INJECTION, SOLUTION INTRAVENOUS at 17:48

## 2017-12-03 RX ADMIN — Medication 1 AMPULE: at 22:24

## 2017-12-03 RX ADMIN — ALBUTEROL SULFATE 2.5 MG: 2.5 SOLUTION RESPIRATORY (INHALATION) at 02:06

## 2017-12-03 RX ADMIN — MEMANTINE HYDROCHLORIDE 10 MG: 5 TABLET ORAL at 08:56

## 2017-12-03 NOTE — PROGRESS NOTES
BP rechecked and found to be 119/60. Pt's daughter at bedside. Informed her pt has been lethargic and sleeping all day; also that pt hasn't eaten anything. Opens eyes to voice. Pt's daughter states the lethargy comes and goes for the last 2 years. Pt's daughter states this may be part of dementia but also may be infection that hasn't truly resolved.

## 2017-12-03 NOTE — PROGRESS NOTES
Hospitalist Progress Note    12/3/2017  Admit Date: 2017  6:53 PM   NAME: Brenda Mcdowell   :  1933   MRN:  [id number]   Attending: Ollie Duvall MD  PCP:  Festus Pedro MD    SUBJECTIVE:     Brenda Mcdowell is a 58KOA with COPD on home 2L O2, dementia, hypothyroidism who was admitted  with acute diverticulitis and intra-abdominal abscess with colovesical fistula. She had a similar admission about a year ago in [facility] and required longterm abx. Family and patient have decided against surgery. Patient was started on clear liquids. 12/3: agitated and confused overnight. Was given haldol and is still sleepy this morning. No family at bedside      Review of Systems negative with exception of pertinent positives noted above      PHYSICAL EXAM       Visit Vitals    /72 (BP 1 Location: Left arm, BP Patient Position: At rest)    Pulse 81    Temp 97.7 °F (36.5 °C)    Resp 18    Ht 5' 3\" (1.6 m)    Wt 63.5 kg (140 lb)    SpO2 96%    BMI 24.8 kg/m2      Temp (24hrs), Av.4 °F (36.9 °C), Min:97.7 °F (36.5 °C), Max:99.5 °F (37.5 °C)    Oxygen Therapy  O2 Sat (%): 96 % (17 0715)  Pulse via Oximetry: 86 beats per minute (17 0206)  O2 Device: Room air (17 0206)  O2 Flow Rate (L/min): 2 l/min (17 1357)    Intake/Output Summary (Last 24 hours) at 17 1033  Last data filed at 17 0806   Gross per 24 hour   Intake             2957 ml   Output                0 ml   Net             2957 ml      General: No acute distress. Lethargic  Head:  Atraumatic Normocephalic. Lungs:  CTA Bilaterally.    CVS:  RRR  Abdomen: Soft, ND, difficult to assess tenderness, but no rebound or guarding, +NABS        Recent Results (from the past 24 hour(s))   PLEASE READ & DOCUMENT PPD TEST IN 72 HRS    Collection Time: 12/02/17 10:07 PM   Result Value Ref Range    PPD  Negative    mm Induration  mm   CBC WITH AUTOMATED DIFF    Collection Time: 17  3:15 AM   Result Value Ref Range    WBC 9.1 4.3 - 11.1 K/uL    RBC 4.03 (L) 4.05 - 5.25 M/uL    HGB 11.6 (L) 11.7 - 15.4 g/dL    HCT 35.1 (L) 35.8 - 46.3 %    MCV 87.1 79.6 - 97.8 FL    MCH 28.8 26.1 - 32.9 PG    MCHC 33.0 31.4 - 35.0 g/dL    RDW 13.4 11.9 - 14.6 %    PLATELET 149 933 - 116 K/uL    MPV 9.8 (L) 10.8 - 14.1 FL    DF AUTOMATED      NEUTROPHILS 72 43 - 78 %    LYMPHOCYTES 20 13 - 44 %    MONOCYTES 8 4.0 - 12.0 %    EOSINOPHILS 0 (L) 0.5 - 7.8 %    BASOPHILS 0 0.0 - 2.0 %    IMMATURE GRANULOCYTES 0 0.0 - 5.0 %    ABS. NEUTROPHILS 6.5 1.7 - 8.2 K/UL    ABS. LYMPHOCYTES 1.8 0.5 - 4.6 K/UL    ABS. MONOCYTES 0.7 0.1 - 1.3 K/UL    ABS. EOSINOPHILS 0.0 0.0 - 0.8 K/UL    ABS. BASOPHILS 0.0 0.0 - 0.2 K/UL    ABS. IMM. GRANS. 0.0 0.0 - 0.5 K/UL   METABOLIC PANEL, BASIC    Collection Time: 12/03/17  3:15 AM   Result Value Ref Range    Sodium 142 136 - 145 mmol/L    Potassium 2.9 (LL) 3.5 - 5.1 mmol/L    Chloride 106 98 - 107 mmol/L    CO2 21 21 - 32 mmol/L    Anion gap 15 7 - 16 mmol/L    Glucose 98 65 - 100 mg/dL    BUN 6 (L) 8 - 23 MG/DL    Creatinine 0.46 (L) 0.6 - 1.0 MG/DL    GFR est AA >60 >60 ml/min/1.73m2    GFR est non-AA >60 >60 ml/min/1.73m2    Calcium 8.4 8.3 - 10.4 MG/DL         Imaging /Procedures /Studies   XR CHEST SNGL V   Final Result   IMPRESSION:      No focal pulmonary consolidation. ASSESSMENT      Hospital Problems as of 12/3/2017  Date Reviewed: 11/29/2017          Codes Class Noted - Resolved POA    Colovesical fistula ICD-10-CM: N32.1  ICD-9-CM: 596.1  11/29/2017 - Present Yes        Abscess, abdomen ([facility] ICD-10-CM: K65.1  ICD-9-CM: 567.22  11/29/2017 - Present Yes        * (Principal)Acute diverticulitis of intestine ICD-10-CM: K57.92  ICD-9-CM: 562.11  11/29/2017 - Present Yes        Fall ICD-10-CM: W19. Axel Grief  ICD-9-CM: E888.9  11/28/2017 - Present Yes        COPD (chronic obstructive pulmonary disease) ([facility] ICD-10-CM: J44.9  ICD-9-CM: 390  5/9/2017 - Present Yes        Hypothyroidism ICD-10-CM: E03.9  ICD-9-CM: 244.9  5/9/2017 - Present Yes        Dementia without behavioral disturbance ICD-10-CM: F03.90  ICD-9-CM: 294.20  5/9/2017 - Present Yes                  Plan:    - Acute diverticulitis with intra-abd abscess and colovesical fistula:  No peritoneal signs  Clear liquids, advance as tolerated  Family has declined surgery  Continue cefepime and flagyl  Continue gentle IVF  Symptomatic treatment with pain meds and anti-emetics  Will ask ID to evaluate for longterm abx plan    - Dementia:  Continue home namenda    - COPD on home O2:  Not in exacerbation  Continue breo and albuterol prn    - hx of hypothyroidism:  TSH within normal limits  Not currently on synthroid    Code: DNR  DVT Prophylaxis: Hep SQ  Dispo: PT/OT following, PPD placed in case needs STR at discharge    Dale Hayes MD

## 2017-12-03 NOTE — CONSULTS
Infectious Disease Consult    Today's Date: 12/3/2017   Admit Date: 11/29/2017    Impression:   · Acute diverticulitis and intra-abdominal abscesses X2 with colovesical fistula  · Similar [place] ~ 1 yr ago treated with long course of IV abx  · Family & patient have asked for non-surgical approach  · Dementia  · DNR  · Urine cx grew 10K-50K CFU Strep viridans (11/29)    Plan:   · Continue Cefepime and Flagyl   · Adjust Cefepime dose to Q 12 hrs  · IR unable to drain the two fluid collections given locations   · Family and patient have declined surgical intervention  · Repeat serial imaging during treatment  · Leukocytosis and fever are improving  · Consider PO Cipro/Flagyl after ~ One week of IV therapy; she will need abx for life-long given the non-surgical approach  · ID will continue to follow    Anti-infectives:     Inpat Anti-Infectives     Start     Ordered Stop    11/30/17 2000  cefepime (MAXIPIME) 2 g in 0.9% sodium chloride (MBP/ADV) 100 mL ADV  2 g,   IntraVENous,   EVERY 24 HOURS      11/29/17 2224 --    11/29/17 2300  metroNIDAZOLE (FLAGYL) IVPB premix 500 mg  500 mg,   IntraVENous,   EVERY 12 HOURS      11/29/17 2147 --          Subjective:   Date of Consultation:  December 3, 2017  Referring Physician: Dr. Yobany Gardner    Patient is a 80 y.o. female with hx COPD on home 2L O2, dementia, hypothyroidism who was admitted 11/29 with acute diverticulitis and intra-abdominal abscess with colovesical fistula. She had a similar admission about a year ago in [place] and required longterm abx. Family and patient have decided against surgery. No fevers or chills. C/O abd pain and fatigue. Urine cx grew 10K-50K CFU Strep viridans (11/29). Blood cx NGTD X 2. She has been started on IV Cefepime and Flagyl. ID is asked to evaluate patient for abx recommendations.     Patient Active Problem List   Diagnosis Code    COPD [facility]chronic obstructive pulmonary disease) ([facility] J44.9    Depression F32.9    Hypothyroidism E03.9    Nocturnal hypoxia G47.34    Dementia without behavioral disturbance F03.90    Onychomycosis due to dermatophyte B35.1    Altered mental status, unspecified R41.82    Diverticulitis of large intestine with abscess without bleeding K57.20    Dysuria R30.0    Fall W19. Fernanda Limbo    Colovesical fistula N32.1    Abscess, abdomen ([address] K65.1    Acute diverticulitis of intestine K57.92     Past Medical History:   Diagnosis Date    COPD (chronic obstructive pulmonary disease) (HCC)     Depression     HLD (hyperlipidemia)     Hypothyroidism     Nocturnal hypoxia       Family History   Problem Relation Age of Onset    Hypertension Mother     Heart Disease Father     Cancer Sister     Dementia Sister     Thyroid Disease Sister     Dementia Brother       Social History   Substance Use Topics    Smoking status: Former Smoker     Packs/day: 1.00     Years: 50.00    Smokeless tobacco: Never Used    Alcohol use 2.4 oz/week     4 Glasses of wine per week     Past Surgical History:   Procedure Laterality Date    HX CHOLECYSTECTOMY        Prior to Admission medications    Medication Sig Start Date End Date Taking? Authorizing Provider   docusate sodium (COLACE) 100 mg capsule Take 100 mg by mouth two (2) times daily as needed for Constipation. Yes Historical Provider   LORazepam (ATIVAN) 0.5 mg tablet Take 0.5 mg by mouth every six (6) hours as needed for Anxiety. Yes Historical Provider   cephALEXin (KEFLEX) 500 mg capsule Take 1 Cap by mouth two (2) times a day for 10 days. 11/28/17 12/8/17 Yes Stormy Galindo MD   DULoxetine 40 mg cpDR Take 40 mg by mouth daily. 7/31/17  Yes Stormy Galindo MD   albuterol (PROVENTIL HFA) 90 mcg/actuation inhaler Take 2 Puffs by inhalation every four (4) hours as needed for Wheezing or Shortness of Breath.    Yes Historical Provider   Benzocaine-Menthol (CHLORASEPTIC SORE THROAT) 6-10 mg lozg 1-2 Lozenges by Mucous Membrane route every four (4) hours as needed for Other (sore throat). Contracted Patient with [facility]. Using patient supply of home med. Yes Historical Provider   memantine (NAMENDA) 10 mg tablet Take 10 mg by mouth two (2) times a day. Yes Historical Provider   krill-om-3-dha-epa-phospho-ast (MEGARED OMEGA-3 KRILL OIL) 263-79-47-39 mg cap Take 300 mg by mouth daily. 17   Darius Dyson MD   ergocalciferol, vitamin D2, 2,000 unit tab Take 2,000 Units by mouth daily. 17   Darius Dyson MD   fluticasone-vilanterol (BREO ELLIPTA) 674-55 mcg/dose inhaler Take 1 Puff by inhalation daily. 17   Darius Dyson MD   ciclopirox (LOPROX) 0.77 % topical cream Apply 1 g to affected area two (2) times a day. To affected area bid. Contracted Patient with [facility]. Using patient supply of home med. Historical Provider       No Known Allergies     Review of Systems:  A comprehensive review of systems was negative except for that written in the History of Present Illness. Objective:     Visit Vitals    /60    Pulse 84    Temp 97.5 °F (36.4 °C)    Resp 16    Ht 5' 3\" (1.6 m)    Wt 63.5 kg (140 lb)    SpO2 94%    BMI 24.8 kg/m2     Temp (24hrs), Av °F (36.7 °C), Min:96.9 °F (36.1 °C), Max:99.5 °F (37.5 °C)       Lines:  Peripheral IV:            Physical Exam:    General:  Alert, confused, cooperative, well noursished, well developed, appears stated age   Eyes:  Sclera anicteric. Pupils equally round and reactive to light.    Mouth/Throat: Mucous membranes normal, oral pharynx clear   Neck: Supple   Lungs:   Clear to auscultation bilaterally, good effort   CV:  Regular rate and rhythm,no murmur, click, rub or gallop   Abdomen:   Distended and TTP,bowel sounds hypoactive   Extremities: No cyanosis or edema   Skin: Skin color, texture, turgor normal. no acute rash or lesions   Lymph nodes: Cervical and supraclavicular normal   Musculoskeletal: No swelling or deformity   Lines/Devices: Intact, no erythema, drainage or tenderness   Psych: Alert and confused, normal mood affect given the setting         Data Review:     CBC:Recent Labs      175  17   0516   WBC  9.1  8.9  9.1   GRANS  72  70  73   MONOS  8  10  8   EOS  0*  1  0*   ANEU  6.5  6.2  6.7   ABL  1.8  1.6  1.7   HGB  11.6*  11.0*  11.5*   HCT  35.1*  34.1*  34.7*   PLT  265  256  249       BMP:  Recent Labs      17   0516   CREA  0.46*  0.39*  0.52*   BUN  6*  11  12   NA  142  142  141   K  2.9*  3.6  3.6   CL  106  107  105   CO2  21  21  25   AGAP  15  14  11   GLU  98  53*  80       LFTS:  No results for input(s): TBILI, ALT, SGOT, AP, TP, ALB in the last 72 hours. Microbiology:     All Micro Results     Procedure Component Value Units Date/Time    CULTURE, URINE [[id number]]  (Abnormal) Collected:  17    Order Status:  Completed Specimen:  Urine from Cath Urine Updated:  17 1326     Special Requests: NO SPECIAL REQUESTS        Culture result:         10,000 to 50,000 COLONIES/mL STREPTOCOCCUS VIRIDANS (A)              THIS ORGANISM WILL BE HELD FOR 7 DAYS. IF FURTHER TESTING IS REQUIRED PLEASE NOTIFY MICROBIOLOGY    CULTURE, BLOOD [[id number]] Collected:  17    Order Status:  Completed Specimen:  Blood from Blood Updated:  17 101     Special Requests: --        RIGHT  Antecubital       Culture result: NO GROWTH 3 DAYS       CULTURE, BLOOD [[id number]] Collected:  17    Order Status:  Completed Specimen:  Blood from Blood Updated:  17     Special Requests: --        LEFT  Antecubital       Culture result: NO GROWTH 3 DAYS             Imagin/29 CXR (-)     CT ABD  IMPRESSION: Acute sigmoid diverticulitis with 2 separate abscess collections. Associated colovesical fistula.      CT head (-)    Signed By: Clifford Saavedra NP     December 3, 2017        This patient was personally seen, examined, and discussed with the nurse practitioner. I agree with the history, physical exam, assessment, and plan except as noted below. Ms Matilda Dinh has a complex pelvic abscess that is closely involved with the bladder and a colovesical fistula admitted with sepsis/UTI. This complication has been ongoing for a year or more, but she was being taken care of in [place], [place]. She has dementia and her family is not interested in invasive measures. I do not think that this problem can be eradicated with antibiotics. Her best hope is for a management solution with indefinite antibiotics. As long as she is improving then I recommend continuing cefepime and metronidazole. When she clinically is closer to baseline in regard to mental status, SIRS, pain then she can be changed to oral antibiotics for an effort at lifelong suppression.     James De La Cruz MD

## 2017-12-03 NOTE — PROGRESS NOTES
Hourly rounds complete this shift. Dr. Piter Rosas called r/t patient increased agitation, pulling IV lines and climbing over bed rails. Haldol ordered without result, Ativan given, patient is  now sleeping.

## 2017-12-03 NOTE — PROGRESS NOTES
Called due to patient with increasing agitation, pulling at lines, attempting to get out of bed. Will order prn haldol.     Gregor Aguilar MD

## 2017-12-04 LAB
ANION GAP SERPL CALC-SCNC: 12 MMOL/L (ref 7–16)
BASOPHILS # BLD: 0 K/UL (ref 0–0.2)
BASOPHILS NFR BLD: 0 % (ref 0–2)
BUN SERPL-MCNC: 4 MG/DL (ref 8–23)
CALCIUM SERPL-MCNC: 8.2 MG/DL (ref 8.3–10.4)
CHLORIDE SERPL-SCNC: 110 MMOL/L (ref 98–107)
CO2 SERPL-SCNC: 24 MMOL/L (ref 21–32)
CREAT SERPL-MCNC: 0.4 MG/DL (ref 0.6–1)
DIFFERENTIAL METHOD BLD: ABNORMAL
EOSINOPHIL # BLD: 0.1 K/UL (ref 0–0.8)
EOSINOPHIL NFR BLD: 1 % (ref 0.5–7.8)
ERYTHROCYTE [DISTWIDTH] IN BLOOD BY AUTOMATED COUNT: 13.5 % (ref 11.9–14.6)
GLUCOSE SERPL-MCNC: 72 MG/DL (ref 65–100)
HCT VFR BLD AUTO: 35.6 % (ref 35.8–46.3)
HGB BLD-MCNC: 11.5 G/DL (ref 11.7–15.4)
IMM GRANULOCYTES # BLD: 0 K/UL (ref 0–0.5)
IMM GRANULOCYTES NFR BLD AUTO: 0 % (ref 0–5)
LYMPHOCYTES # BLD: 1.8 K/UL (ref 0.5–4.6)
LYMPHOCYTES NFR BLD: 28 % (ref 13–44)
MCH RBC QN AUTO: 28.4 PG (ref 26.1–32.9)
MCHC RBC AUTO-ENTMCNC: 32.3 G/DL (ref 31.4–35)
MCV RBC AUTO: 87.9 FL (ref 79.6–97.8)
MONOCYTES # BLD: 0.4 K/UL (ref 0.1–1.3)
MONOCYTES NFR BLD: 7 % (ref 4–12)
NEUTS SEG # BLD: 4.1 K/UL (ref 1.7–8.2)
NEUTS SEG NFR BLD: 64 % (ref 43–78)
PLATELET # BLD AUTO: 266 K/UL (ref 150–450)
PMV BLD AUTO: 9.9 FL (ref 10.8–14.1)
POTASSIUM SERPL-SCNC: 3.2 MMOL/L (ref 3.5–5.1)
RBC # BLD AUTO: 4.05 M/UL (ref 4.05–5.25)
SODIUM SERPL-SCNC: 146 MMOL/L (ref 136–145)
WBC # BLD AUTO: 6.5 K/UL (ref 4.3–11.1)

## 2017-12-04 PROCEDURE — 85025 COMPLETE CBC W/AUTO DIFF WBC: CPT | Performed by: INTERNAL MEDICINE

## 2017-12-04 PROCEDURE — 74011250636 HC RX REV CODE- 250/636: Performed by: INTERNAL MEDICINE

## 2017-12-04 PROCEDURE — 36415 COLL VENOUS BLD VENIPUNCTURE: CPT | Performed by: INTERNAL MEDICINE

## 2017-12-04 PROCEDURE — 80048 BASIC METABOLIC PNL TOTAL CA: CPT | Performed by: INTERNAL MEDICINE

## 2017-12-04 PROCEDURE — 94640 AIRWAY INHALATION TREATMENT: CPT

## 2017-12-04 PROCEDURE — 74011250637 HC RX REV CODE- 250/637: Performed by: INTERNAL MEDICINE

## 2017-12-04 PROCEDURE — 97530 THERAPEUTIC ACTIVITIES: CPT

## 2017-12-04 PROCEDURE — 74011000250 HC RX REV CODE- 250: Performed by: INTERNAL MEDICINE

## 2017-12-04 PROCEDURE — 65270000029 HC RM PRIVATE

## 2017-12-04 PROCEDURE — 77010033678 HC OXYGEN DAILY

## 2017-12-04 PROCEDURE — 74011000258 HC RX REV CODE- 258: Performed by: NURSE PRACTITIONER

## 2017-12-04 PROCEDURE — 74011250636 HC RX REV CODE- 250/636: Performed by: NURSE PRACTITIONER

## 2017-12-04 PROCEDURE — 94760 N-INVAS EAR/PLS OXIMETRY 1: CPT

## 2017-12-04 RX ORDER — MAG HYDROX/ALUMINUM HYD/SIMETH 200-200-20
30 SUSPENSION, ORAL (FINAL DOSE FORM) ORAL
Status: DISCONTINUED | OUTPATIENT
Start: 2017-12-04 | End: 2017-12-13 | Stop reason: HOSPADM

## 2017-12-04 RX ORDER — POTASSIUM CHLORIDE 20MEQ/15ML
20 LIQUID (ML) ORAL 2 TIMES DAILY WITH MEALS
Status: COMPLETED | OUTPATIENT
Start: 2017-12-04 | End: 2017-12-04

## 2017-12-04 RX ADMIN — HEPARIN SODIUM 5000 UNITS: 5000 INJECTION, SOLUTION INTRAVENOUS; SUBCUTANEOUS at 21:24

## 2017-12-04 RX ADMIN — ALBUTEROL SULFATE 2.5 MG: 2.5 SOLUTION RESPIRATORY (INHALATION) at 08:08

## 2017-12-04 RX ADMIN — Medication 1 AMPULE: at 09:00

## 2017-12-04 RX ADMIN — SODIUM CHLORIDE 2 G: 900 INJECTION, SOLUTION INTRAVENOUS at 07:34

## 2017-12-04 RX ADMIN — POTASSIUM CHLORIDE 20 MEQ: 20 SOLUTION ORAL at 17:19

## 2017-12-04 RX ADMIN — MEMANTINE HYDROCHLORIDE 10 MG: 5 TABLET ORAL at 09:00

## 2017-12-04 RX ADMIN — ALBUTEROL SULFATE 2.5 MG: 2.5 SOLUTION RESPIRATORY (INHALATION) at 14:15

## 2017-12-04 RX ADMIN — POTASSIUM CHLORIDE 20 MEQ: 20 SOLUTION ORAL at 09:00

## 2017-12-04 RX ADMIN — MEMANTINE HYDROCHLORIDE 10 MG: 5 TABLET ORAL at 18:00

## 2017-12-04 RX ADMIN — METRONIDAZOLE 500 MG: 500 INJECTION, SOLUTION INTRAVENOUS at 11:40

## 2017-12-04 RX ADMIN — BUDESONIDE 500 MCG: 0.5 INHALANT RESPIRATORY (INHALATION) at 08:08

## 2017-12-04 RX ADMIN — HEPARIN SODIUM 5000 UNITS: 5000 INJECTION, SOLUTION INTRAVENOUS; SUBCUTANEOUS at 09:00

## 2017-12-04 RX ADMIN — HALOPERIDOL LACTATE 4 MG: 5 INJECTION, SOLUTION INTRAMUSCULAR at 21:15

## 2017-12-04 RX ADMIN — SODIUM CHLORIDE 2 G: 900 INJECTION, SOLUTION INTRAVENOUS at 17:15

## 2017-12-04 RX ADMIN — SODIUM CHLORIDE 75 ML/HR: 900 INJECTION, SOLUTION INTRAVENOUS at 08:43

## 2017-12-04 NOTE — PROGRESS NOTES
Hourly rounds completed throughout this shift. Pt resting in bed; denies needs at this time. Will continue to monitor and report to oncoming day shift nurse.

## 2017-12-04 NOTE — PROGRESS NOTES
Problem: Mobility Impaired (Adult and Pediatric)  Goal: *Acute Goals and Plan of Care (Insert Text)  1. Ms. De Guzman will perform supine to sit and sit to supine independently in 5 days. 2.  Ms. De Guzman will perform sit to stand and bed to chair independently in 5 days. 3.  Ms. De Guzman will perform gait with rolling walker 300 ft independently in 5 days. PHYSICAL THERAPY: Daily Note, Treatment Day: 2nd, AM 12/4/2017  INPATIENT: Hospital Day: 6  [place] / Product Type: Medicare /      NAME/AGE/GENDER: Enmanuel Sotelo is a 80 y.o. female   PRIMARY DIAGNOSIS: Acute diverticulitis of intestine  Abscess, abdomen ([address]  Colovesical fistula Acute diverticulitis of intestine Acute diverticulitis of intestine        ICD-10: Treatment Diagnosis:   · Generalized Muscle Weakness (M62.81)  · Difficulty in walking, Not elsewhere classified (R26.2)   Precaution/Allergies:  Review of patient's allergies indicates no known allergies. ASSESSMENT:     Ms. Faiza Wolfe is agreeable to therapy treatment this morning without complaints. Transfers to sitting with mod I. Ambulates 200 ft in hallway which is double her previous gait distance. Needs SBA for safety only and walker. Returned to room and up to chair afterwards with LEs elevated, needs in reach, and coffee provided after ok from RN. Ms. Faiza Wolfe continues to be very pleasantly confused and is progressing well with mobility. Will continue with therapy efforts. Appears to be functioning close to baseline at this time and hopeful to return to her assisted living facility. This section established at most recent assessment   PROBLEM LIST (Impairments causing functional limitations):  1. Decreased Transfer Abilities  2. Decreased Ambulation Ability/Technique  3. Decreased Activity Tolerance   INTERVENTIONS PLANNED: (Benefits and precautions of physical therapy have been discussed with the patient.)  1. Bed Mobility  2.  Gait Training  3. Therapeutic Activites  4. Transfer Training  5. Group Therapy     TREATMENT PLAN: Frequency/Duration: 3 times a week for duration of hospital stay  Rehabilitation Potential For Stated Goals: Good     RECOMMENDED REHABILITATION/EQUIPMENT: (at time of discharge pending progress): Due to the probability of continued deficits (see above) this patient will likely need continued skilled physical therapy after discharge. Equipment:    None at this time              HISTORY:   History of Present Injury/Illness (Reason for Referral):  Jesus Morales is a 80 y.o. female who has a PMH of COPD on home oxygen, dementia, Hypothyroidism who was brought in by her daughter after she was diagnosed with acute diverticulitis and intraabdominal abscess with colovesical fistula. This is the second time of this episode, since 1 year ago she was admitted at [facility] for sepsis after an intraabdominal infection. She was not a candidate for surgery and received long-term atb via PICC line. Ms Madison Berger used to be on hospice due to her COPD and dementia, but was recently discharged and send to an [facility]. Has had episodes of UTI, with recent trial of keflex 1 week ago. Currently she complains of dysuria, foul-smell in urine, decreased appetite, confusion and multiple episodes of falls according to her daughter. Denies vomiting, fever, chills, abdominal pain, diarrhea, melena, hematochezia. Upon arrival to ER VS /63  HR 98  T97F  O2: 91% room air. Pertinent labs: wbc 13k, HB 12, normal chemistry, normal creatinine. Low albumin, high alk phosphatase. EKG NSR,unspecif st changes. Abdomen/pelvis CT scan: Acute sigmoid diverticulitis with 2 separate abscess collections. Associated colovesical fistula. Brain ct: negative for acute pathology. Received cefepime/flagyl due to shortage of zosyn in the hospital. Lactic acid, ua pending. General surgeon informed and plan for no surgical procedure at the moment.    Past Medical History/Comorbidities:   Ms. Anand Kim  has a past medical history of COPD (chronic obstructive pulmonary disease) ([address] Depression; HLD (hyperlipidemia); Hypothyroidism; and Nocturnal hypoxia. Ms. Anand Kim  has a past surgical history that includes cholecystectomy. Social History/Living Environment:   Home Environment: Assisted living  One/Two Story Residence: One story  Living Alone: No  Support Systems: Child(cesar), Assisted living  Patient Expects to be Discharged to[de-identified] Assisted living  Current DME Used/Available at Home: Shower chair, Grab bars  Tub or Shower Type: Shower  Prior Level of Function/Work/Activity:  Independent with walker in memory care unit. copd, dementia   Number of Personal Factors/Comorbidities that affect the Plan of Care: 1-2: MODERATE COMPLEXITY   EXAMINATION:   Most Recent Physical Functioning:   Gross Assessment:                  Posture:     Balance:  Sitting: Intact  Standing: Impaired  Standing - Static: Fair (+)  Standing - Dynamic : Fair (+) Bed Mobility:  Rolling: Modified independent  Supine to Sit: Modified independent  Scooting: Modified independent  Wheelchair Mobility:     Transfers:  Sit to Stand: Supervision  Stand to Sit: Supervision  Bed to Chair: Stand-by asssistance  Interventions: Verbal cues; Safety awareness training  Duration: 16 Minutes  Gait:     Base of Support: Narrowed  Step Length: Left shortened;Right shortened  Distance (ft): 200 Feet (ft)  Assistive Device: Walker, rolling  Ambulation - Level of Assistance: Stand-by asssistance  Interventions: Verbal cues; Visual/Demos; Safety awareness training      Body Structures Involved:  1. Muscles Body Functions Affected:  1. Movement Related Activities and Participation Affected:  1.  Mobility   Number of elements that affect the Plan of Care: 3: MODERATE COMPLEXITY   CLINICAL PRESENTATION:   Presentation: Evolving clinical presentation with changing clinical characteristics: [address] [place] [id number] AM-PAC 6 Clicks   Basic Mobility Inpatient Short Form  How much difficulty does the patient currently have. .. Unable A Lot A Little None   1. Turning over in bed (including adjusting bedclothes, sheets and blankets)? [] 1   [] 2   [x] 3   [] 4   2. Sitting down on and standing up from a chair with arms ( e.g., wheelchair, bedside commode, etc.)   [] 1   [] 2   [x] 3   [] 4   3. Moving from lying on back to sitting on the side of the bed? [] 1   [] 2   [x] 3   [] 4   How much help from another person does the patient currently need. .. Total A Lot A Little None   4. Moving to and from a bed to a chair (including a wheelchair)? [] 1   [] 2   [x] 3   [] 4   5. Need to walk in hospital room? [] 1   [] 2   [x] 3   [] 4   6. Climbing 3-5 steps with a railing? [] 1   [] 2   [x] 3   [] 4   © 2007, Trustees of [address] [place] [id number], under license to [employer]. All rights reserved      Score:  Initial: 18 Most Recent: X (Date: -- )    Interpretation of Tool:  Represents activities that are increasingly more difficult (i.e. Bed mobility, Transfers, Gait). Score 24 23 22-20 19-15 14-10 9-7 6     Modifier CH CI CJ CK CL CM CN      ? Mobility - Walking and Moving Around:     - CURRENT STATUS: CK - 40%-59% impaired, limited or restricted    - GOAL STATUS: CK - 40%-59% impaired, limited or restricted    - D/C STATUS:  ---------------To be determined---------------  [place] / Product Type: Medicare /      Medical Necessity:     · Patient is expected to demonstrate progress in functional technique to increase independence with mobility and gait. .  Reason for Services/Other Comments:  · Patient continues to require present interventions due to patient's inability to function at baseline. .   Use of outcome tool(s) and clinical judgement create a POC that gives a: Questionable prediction of patient's progress: MODERATE COMPLEXITY TREATMENT:   (In addition to Assessment/Re-Assessment sessions the following treatments were rendered)   Pre-treatment Symptoms/Complaints:  \"where are we now? \"  Pain: Initial:   Pain Intensity 1: 0  Post Session:  0/10 no complaints     Therapeutic Activity: (  16 Minutes ):  Therapeutic activities including bed mobility, sit-stand transfers, Chair transfers and Ambulation on level ground to improve mobility, strength, balance and activity tolerance. Required minimal Verbal cues; Visual/Demos; Safety awareness training to promote dynamic balance in standing and promote motor control of bilateral, lower extremity(s). Braces/Orthotics/Lines/Etc:   · none  Treatment/Session Assessment:    · Response to Treatment:  Continuing to progress well with mobility  · Interdisciplinary Collaboration:   o Physical Therapist  o Registered Nurse  · After treatment position/precautions:   o Up in chair  o Bed in low position  o Call light within reach  o RN notified   · Compliance with Program/Exercises: Will assess as treatment progresses. · Recommendations/Intent for next treatment session: \"Next visit will focus on advancements to more challenging activities and reduction in assistance provided\".   Total Treatment Duration  PT Patient Time In/Time Out  Time In: 1100  Time Out: [address], DPT

## 2017-12-04 NOTE — PROGRESS NOTES
Infectious Disease Progress Note    Today's Date: 2017   Admit Date: 2017    Impression:   · Acute diverticulitis and intra-abdominal abscesses X2 with colovesical fistula  · Similar [place] ~ 1 yr ago treated with long course of IV abx  · Family & patient have asked for non-surgical approach  · Dementia  · DNR  · Urine cx grew 10K-50K CFU Strep viridans ()    Plan:   · Continue Cefepime and Flagyl for about a week of IV therapy and transition to oral cipro/flagyl for life - no surgery planned. · Repeat serial imaging recommended. Anti-infectives:     Cefepime (-  Metronidazole (-    Subjective:   Date of Consultation:  2017  Referring Physician: Dr. Kevin Gonzalez        No Known Allergies     Review of Systems:  A comprehensive review of systems was negative except for that written in the History of Present Illness. Objective:     Visit Vitals    /74 (BP 1 Location: Left arm, BP Patient Position: At rest)    Pulse 91    Temp 97.8 °F (36.6 °C)    Resp 20    Ht 5' 3\" (1.6 m)    Wt 63.5 kg (140 lb)    SpO2 90%    BMI 24.8 kg/m2     Temp (24hrs), Av.6 °F (36.4 °C), Min:96.9 °F (36.1 °C), Max:98 °F (36.7 °C)       Lines:  Peripheral IV:            Physical Exam:    General:  Alert, cooperative, well nourished, well developed, appears stated age   Eyes:  Sclera anicteric. Pupils equally round and reactive to light.    Mouth/Throat: Mucous membranes normal, oral pharynx clear   Neck: Supple   Lungs:   Clear to auscultation bilaterally, good effort   CV:  Regular rate and rhythm, no audible murmur, click, rub or gallop   Abdomen:   Distended and non tender, bowel sounds active   Extremities: No cyanosis or edema   Skin: Skin color, texture, turgor normal. no acute rash or lesions   Lymph nodes: Cervical and supraclavicular normal   Musculoskeletal: No swelling or deformity   Lines/Devices:  Intact, no erythema, drainage or tenderness   Psych: Alert, sitting in the chair, pleasant mood affect given the setting         Data Review:     CBC:  Recent Labs      176  175   WBC  6.5  9.1  8.9   GRANS  64  72  70   MONOS  7  8  10   EOS  1  0*  1   ANEU  4.1  6.5  6.2   ABL  1.8  1.8  1.6   HGB  11.5*  11.6*  11.0*   HCT  35.6*  35.1*  34.1*   PLT  266  265  256       BMP:  Recent Labs      176  17   CREA  0.40*  0.46*  0.39*   BUN  4*  6*  11   NA  146*  142  142   K  3.2*  2.9*  3.6   CL  110*  106  107   CO2  24  21  21   AGAP  12  15  14   GLU  72  98  53*       LFTS:  No results for input(s): TBILI, ALT, SGOT, AP, TP, ALB in the last 72 hours. Microbiology:     All Micro Results     Procedure Component Value Units Date/Time    CULTURE, BLOOD [[id number]] Collected:  17    Order Status:  Completed Specimen:  Blood from Blood Updated:  17     Special Requests: --        RIGHT  Antecubital       Culture result: NO GROWTH 4 DAYS       CULTURE, BLOOD [[id number]] Collected:  17    Order Status:  Completed Specimen:  Blood from Blood Updated:  17     Special Requests: --        LEFT  Antecubital       Culture result: NO GROWTH 4 DAYS       CULTURE, URINE [[id number]]  (Abnormal) Collected:  17    Order Status:  Completed Specimen:  Urine from Cath Urine Updated:  17 1326     Special Requests: NO SPECIAL REQUESTS        Culture result:         10,000 to 50,000 COLONIES/mL STREPTOCOCCUS VIRIDANS (A)              THIS ORGANISM WILL BE HELD FOR 7 DAYS. IF FURTHER TESTING IS REQUIRED PLEASE NOTIFY MICROBIOLOGY          Imagin/29 CXR (-)     CT ABD  IMPRESSION: Acute sigmoid diverticulitis with 2 separate abscess collections. Associated colovesical fistula.      CT head (-)    Signed By: Ivette Sparks NP     2017        This patient was personally seen, examined, and discussed with the nurse practitioner. I agree with the history, physical exam, assessment, and plan except as noted below. Ms Smith Sanchez has a complex pelvic abscess that is closely involved with the bladder and a colovesical fistula admitted with sepsis/UTI. This complication has been ongoing for a year or more, but she was being taken care of in [place], [place]. She has dementia and her family is not interested in invasive measures. I do not think that this problem can be eradicated with antibiotics. Her best hope is for a management solution with indefinite antibiotics. As long as she is improving then I recommend continuing cefepime and metronidazole. When she clinically is closer to baseline in regard to mental status, SIRS, pain then she can be changed to oral antibiotics for an effort at lifelong suppression.     Shawn Owens NP

## 2017-12-04 NOTE — PROGRESS NOTES
Care Management Interventions  PCP Verified by CM: Yes  Mode of Transport at Discharge: BLS  Transition of Care Consult (CM Consult): SNF (Pt to return to [facility] in [place], [place] on Wednesday, 17)  Partner SNF: No  Reason Why Partner SNF Not Chosen: Positive previous encounter, Location, Friend/family recommendation (Pt had been living at [facility] previously)  Physical Therapy Consult: Yes  Occupational Therapy Consult: Yes  Speech Therapy Consult: No  Current Support Network: Assisted Living ([facility])  Plan discussed with Pt/Family/Caregiver: Yes  Freedom of Choice Offered: Yes  Discharge Location  Discharge Placement: Assisted Living ([address] [place] [address])     Pt is an 79 yo female admitted to [facility] from [address] [place] [address] on 17 for Acute diverticulitis with intraabdominal abscess & colovesicular  Fistula, h/o falls, COPD on home O2 - not in exacerbation, Dementia, hypothyroidism - not in therapy. Pt moved to a one room efficiency @ the [address] [place] [address] in [place], [place] after pt's spouse . Pt's emergency contact & POA is her daughter, Adela Salinas (@ [phone]). While inpt, pt has had PI/OT/ST consults. Pt's PCP is Dr Laqueta Severs. This SW contacted pt's daughter, Theodor Co with Admissions,  & pt's Med Tech Safia re: pt's d/c, informing all that pt will return to [address] [place] [address] on 17. Pt's daughter planning to transport pt from D back to [address] [place] [address] on 17, per daughter.

## 2017-12-04 NOTE — PROGRESS NOTES
Pt daughter, Yessi Vieira, requesting not to give pt medication to help her \"calm down at night. \"  Pt states she often wakes up at night and doesn't realize where she is for a few minutes. Pt daughter asking RN to reorient pt and have pt call daughter if she is anxious, no matter what hour of the night.

## 2017-12-04 NOTE — PROGRESS NOTES
OT note:  Pt declined treatment despite encouragement. When therapist asked pt why she didn't want to participate, she stated that she was feeling \"lackadaisical.\" Pt would not even agree to just sitting up in the chair.   James Hull

## 2017-12-04 NOTE — PROGRESS NOTES
Hospitalist Progress Note    2017  Admit Date: 2017  6:53 PM   NAME: Michelle Turner   :  1933   MRN:  [id number]   Attending: Norene Mcardle, MD  PCP:  Kassandra Dawn MD    SUBJECTIVE:     Michelle Turner is a 89GPV with COPD on home 2L O2, dementia, hypothyroidism who was admitted  with acute diverticulitis and intra-abdominal abscess with colovesical fistula. She had a similar admission about a year ago in [facility] and required longterm abx. Family and patient have decided against surgery. Patient was started on clear liquids. : doing well today. Sitting in the chair, eating breakfast. Tolerating clears without nausea or worsening abd pain. Review of Systems negative with exception of pertinent positives noted above      PHYSICAL EXAM       Visit Vitals    /74 (BP 1 Location: Left arm, BP Patient Position: At rest)    Pulse 91    Temp 97.8 °F (36.6 °C)    Resp 20    Ht 5' 3\" (1.6 m)    Wt 63.5 kg (140 lb)    SpO2 90%    BMI 24.8 kg/m2      Temp (24hrs), Av.6 °F (36.4 °C), Min:96.9 °F (36.1 °C), Max:98 °F (36.7 °C)    Oxygen Therapy  O2 Sat (%): 90 % (17 0810)  Pulse via Oximetry: 91 beats per minute (17 0810)  O2 Device: Room air (17 0810)  O2 Flow Rate (L/min): 2 l/min (17 1357)    Intake/Output Summary (Last 24 hours) at 17 0907  Last data filed at 17 0905   Gross per 24 hour   Intake              240 ml   Output                0 ml   Net              240 ml      General: No acute distress. Sitting in chair  Head:  Atraumatic Normocephalic. Lungs:  CTA Bilaterally.    CVS:  RRR  Abdomen: Soft, ND, NTTP, +NABS        Recent Results (from the past 24 hour(s))   CBC WITH AUTOMATED DIFF    Collection Time: 17  3:56 AM   Result Value Ref Range    WBC 6.5 4.3 - 11.1 K/uL    RBC 4.05 4.05 - 5.25 M/uL    HGB 11.5 (L) 11.7 - 15.4 g/dL    HCT 35.6 (L) 35.8 - 46.3 %    MCV 87.9 79.6 - 97.8 FL    MCH 28.4 26.1 - 32.9 PG MCHC 32.3 31.4 - 35.0 g/dL    RDW 13.5 11.9 - 14.6 %    PLATELET 445 013 - 773 K/uL    MPV 9.9 (L) 10.8 - 14.1 FL    DF AUTOMATED      NEUTROPHILS 64 43 - 78 %    LYMPHOCYTES 28 13 - 44 %    MONOCYTES 7 4.0 - 12.0 %    EOSINOPHILS 1 0.5 - 7.8 %    BASOPHILS 0 0.0 - 2.0 %    IMMATURE GRANULOCYTES 0 0.0 - 5.0 %    ABS. NEUTROPHILS 4.1 1.7 - 8.2 K/UL    ABS. LYMPHOCYTES 1.8 0.5 - 4.6 K/UL    ABS. MONOCYTES 0.4 0.1 - 1.3 K/UL    ABS. EOSINOPHILS 0.1 0.0 - 0.8 K/UL    ABS. BASOPHILS 0.0 0.0 - 0.2 K/UL    ABS. IMM. GRANS. 0.0 0.0 - 0.5 K/UL   METABOLIC PANEL, BASIC    Collection Time: 12/04/17  3:56 AM   Result Value Ref Range    Sodium 146 (H) 136 - 145 mmol/L    Potassium 3.2 (L) 3.5 - 5.1 mmol/L    Chloride 110 (H) 98 - 107 mmol/L    CO2 24 21 - 32 mmol/L    Anion gap 12 7 - 16 mmol/L    Glucose 72 65 - 100 mg/dL    BUN 4 (L) 8 - 23 MG/DL    Creatinine 0.40 (L) 0.6 - 1.0 MG/DL    GFR est AA >60 >60 ml/min/1.73m2    GFR est non-AA >60 >60 ml/min/1.73m2    Calcium 8.2 (L) 8.3 - 10.4 MG/DL         Imaging /Procedures /Studies   XR CHEST SNGL V   Final Result   IMPRESSION:      No focal pulmonary consolidation. ASSESSMENT      Hospital Problems as of 12/4/2017  Date Reviewed: 11/29/2017          Codes Class Noted - Resolved POA    Colovesical fistula ICD-10-CM: N32.1  ICD-9-CM: 596.1  11/29/2017 - Present Yes        Abscess, abdomen ([facility] ICD-10-CM: K65.1  ICD-9-CM: 567.22  11/29/2017 - Present Yes        * (Principal)Acute diverticulitis of intestine ICD-10-CM: K57.92  ICD-9-CM: 562.11  11/29/2017 - Present Yes        Fall ICD-10-CM: W19. Reyes Trotter  ICD-9-CM: E888.9  11/28/2017 - Present Yes        COPD (chronic obstructive pulmonary disease) ([facility] ICD-10-CM: J44.9  ICD-9-CM: 846  5/9/2017 - Present Yes        Hypothyroidism ICD-10-CM: E03.9  ICD-9-CM: 244.9  5/9/2017 - Present Yes        Dementia without behavioral disturbance ICD-10-CM: F03.90  ICD-9-CM: 294.20  5/9/2017 - Present Yes                  Plan:    - Acute diverticulitis with intra-abd abscess and colovesical fistula:  No peritoneal signs  Advance diet to full liquids today  Family has declined surgery  Given improvement will continue cefepime and flagyl for 7 days total (now on day 5)  Then will switch to oral abx for lifelong suppresssion  Appreciate ID recommendations    - Dementia:  Continue home namenda    - COPD on home O2:  Not in exacerbation  Continue breo and albuterol prn    - hx of hypothyroidism:  TSH within normal limits  Not currently on synthroid    Code: DNR  DVT Prophylaxis: Hep SQ  Dispo: PT/OT following, PPD placed in case needs STR at discharge; likely DC to [facility] once IV abx are complete (12/6)    Jero Schuler MD

## 2017-12-05 LAB
ANION GAP SERPL CALC-SCNC: 8 MMOL/L (ref 7–16)
APPEARANCE UR: ABNORMAL
BACTERIA SPEC CULT: NORMAL
BACTERIA SPEC CULT: NORMAL
BACTERIA URNS QL MICRO: 0 /HPF
BASOPHILS # BLD: 0 K/UL (ref 0–0.2)
BASOPHILS NFR BLD: 0 % (ref 0–2)
BILIRUB UR QL: NEGATIVE
BUN SERPL-MCNC: 5 MG/DL (ref 8–23)
CALCIUM SERPL-MCNC: 8.2 MG/DL (ref 8.3–10.4)
CHLORIDE SERPL-SCNC: 108 MMOL/L (ref 98–107)
CO2 SERPL-SCNC: 31 MMOL/L (ref 21–32)
COLOR UR: YELLOW
CREAT SERPL-MCNC: 0.42 MG/DL (ref 0.6–1)
DIFFERENTIAL METHOD BLD: ABNORMAL
EOSINOPHIL # BLD: 0.1 K/UL (ref 0–0.8)
EOSINOPHIL NFR BLD: 1 % (ref 0.5–7.8)
EPI CELLS #/AREA URNS HPF: ABNORMAL /HPF
ERYTHROCYTE [DISTWIDTH] IN BLOOD BY AUTOMATED COUNT: 13.5 % (ref 11.9–14.6)
GLUCOSE SERPL-MCNC: 125 MG/DL (ref 65–100)
GLUCOSE UR STRIP.AUTO-MCNC: NEGATIVE MG/DL
HCT VFR BLD AUTO: 33.6 % (ref 35.8–46.3)
HGB BLD-MCNC: 11 G/DL (ref 11.7–15.4)
HGB UR QL STRIP: ABNORMAL
IMM GRANULOCYTES # BLD: 0 K/UL (ref 0–0.5)
IMM GRANULOCYTES NFR BLD AUTO: 0 % (ref 0–5)
KETONES UR QL STRIP.AUTO: NEGATIVE MG/DL
LEUKOCYTE ESTERASE UR QL STRIP.AUTO: ABNORMAL
LYMPHOCYTES # BLD: 2 K/UL (ref 0.5–4.6)
LYMPHOCYTES NFR BLD: 29 % (ref 13–44)
MCH RBC QN AUTO: 29.2 PG (ref 26.1–32.9)
MCHC RBC AUTO-ENTMCNC: 32.7 G/DL (ref 31.4–35)
MCV RBC AUTO: 89.1 FL (ref 79.6–97.8)
MONOCYTES # BLD: 0.6 K/UL (ref 0.1–1.3)
MONOCYTES NFR BLD: 8 % (ref 4–12)
NEUTS SEG # BLD: 4.4 K/UL (ref 1.7–8.2)
NEUTS SEG NFR BLD: 62 % (ref 43–78)
NITRITE UR QL STRIP.AUTO: NEGATIVE
OTHER OBSERVATIONS,UCOM: ABNORMAL
PH UR STRIP: 6.5 [PH] (ref 5–9)
PLATELET # BLD AUTO: 267 K/UL (ref 150–450)
PMV BLD AUTO: 9.6 FL (ref 10.8–14.1)
POTASSIUM SERPL-SCNC: 3 MMOL/L (ref 3.5–5.1)
PROT UR STRIP-MCNC: NEGATIVE MG/DL
RBC # BLD AUTO: 3.77 M/UL (ref 4.05–5.25)
RBC #/AREA URNS HPF: ABNORMAL /HPF
SERVICE CMNT-IMP: NORMAL
SERVICE CMNT-IMP: NORMAL
SODIUM SERPL-SCNC: 147 MMOL/L (ref 136–145)
SP GR UR REFRACTOMETRY: 1 (ref 1–1.02)
UROBILINOGEN UR QL STRIP.AUTO: 0.2 EU/DL (ref 0.2–1)
WBC # BLD AUTO: 7.1 K/UL (ref 4.3–11.1)
WBC URNS QL MICRO: ABNORMAL /HPF

## 2017-12-05 PROCEDURE — 77010033678 HC OXYGEN DAILY

## 2017-12-05 PROCEDURE — 74011250636 HC RX REV CODE- 250/636: Performed by: INTERNAL MEDICINE

## 2017-12-05 PROCEDURE — 74011000258 HC RX REV CODE- 258: Performed by: NURSE PRACTITIONER

## 2017-12-05 PROCEDURE — 94760 N-INVAS EAR/PLS OXIMETRY 1: CPT

## 2017-12-05 PROCEDURE — 77030034849

## 2017-12-05 PROCEDURE — 85025 COMPLETE CBC W/AUTO DIFF WBC: CPT | Performed by: INTERNAL MEDICINE

## 2017-12-05 PROCEDURE — 36415 COLL VENOUS BLD VENIPUNCTURE: CPT | Performed by: INTERNAL MEDICINE

## 2017-12-05 PROCEDURE — 74011000250 HC RX REV CODE- 250: Performed by: INTERNAL MEDICINE

## 2017-12-05 PROCEDURE — 74011250637 HC RX REV CODE- 250/637: Performed by: INTERNAL MEDICINE

## 2017-12-05 PROCEDURE — 74011250636 HC RX REV CODE- 250/636: Performed by: NURSE PRACTITIONER

## 2017-12-05 PROCEDURE — 81001 URINALYSIS AUTO W/SCOPE: CPT | Performed by: INTERNAL MEDICINE

## 2017-12-05 PROCEDURE — 94640 AIRWAY INHALATION TREATMENT: CPT

## 2017-12-05 PROCEDURE — 80048 BASIC METABOLIC PNL TOTAL CA: CPT | Performed by: INTERNAL MEDICINE

## 2017-12-05 PROCEDURE — 65270000029 HC RM PRIVATE

## 2017-12-05 RX ADMIN — HEPARIN SODIUM 5000 UNITS: 5000 INJECTION, SOLUTION INTRAVENOUS; SUBCUTANEOUS at 22:01

## 2017-12-05 RX ADMIN — ALBUTEROL SULFATE 2.5 MG: 2.5 SOLUTION RESPIRATORY (INHALATION) at 01:52

## 2017-12-05 RX ADMIN — Medication 1 AMPULE: at 09:41

## 2017-12-05 RX ADMIN — ALBUTEROL SULFATE 2.5 MG: 2.5 SOLUTION RESPIRATORY (INHALATION) at 07:03

## 2017-12-05 RX ADMIN — METRONIDAZOLE 500 MG: 500 INJECTION, SOLUTION INTRAVENOUS at 00:45

## 2017-12-05 RX ADMIN — ALBUTEROL SULFATE 2.5 MG: 2.5 SOLUTION RESPIRATORY (INHALATION) at 19:43

## 2017-12-05 RX ADMIN — MEMANTINE HYDROCHLORIDE 10 MG: 5 TABLET ORAL at 18:33

## 2017-12-05 RX ADMIN — HEPARIN SODIUM 5000 UNITS: 5000 INJECTION, SOLUTION INTRAVENOUS; SUBCUTANEOUS at 09:42

## 2017-12-05 RX ADMIN — METRONIDAZOLE 500 MG: 500 INJECTION, SOLUTION INTRAVENOUS at 22:09

## 2017-12-05 RX ADMIN — SODIUM CHLORIDE 2 G: 900 INJECTION, SOLUTION INTRAVENOUS at 18:33

## 2017-12-05 RX ADMIN — METRONIDAZOLE 500 MG: 500 INJECTION, SOLUTION INTRAVENOUS at 11:47

## 2017-12-05 RX ADMIN — BUDESONIDE 500 MCG: 0.5 INHALANT RESPIRATORY (INHALATION) at 19:43

## 2017-12-05 RX ADMIN — Medication 1 AMPULE: at 22:02

## 2017-12-05 RX ADMIN — ALBUTEROL SULFATE 2.5 MG: 2.5 SOLUTION RESPIRATORY (INHALATION) at 14:30

## 2017-12-05 RX ADMIN — BUDESONIDE 500 MCG: 0.5 INHALANT RESPIRATORY (INHALATION) at 07:03

## 2017-12-05 RX ADMIN — Medication 1 AMPULE: at 00:40

## 2017-12-05 RX ADMIN — MEMANTINE HYDROCHLORIDE 10 MG: 5 TABLET ORAL at 08:31

## 2017-12-05 RX ADMIN — HALOPERIDOL LACTATE 4 MG: 5 INJECTION, SOLUTION INTRAMUSCULAR at 22:05

## 2017-12-05 RX ADMIN — SODIUM CHLORIDE 2 G: 900 INJECTION, SOLUTION INTRAVENOUS at 06:26

## 2017-12-05 NOTE — DISCHARGE INSTRUCTIONS
NUTRITION       Continue Oral Nutrition Supplement (ONS) at discharge.  Recommend Ensure Enlive or a comparable/similar product Three times daily Kady Robbins RD, LD

## 2017-12-05 NOTE — PROGRESS NOTES
PT Note:  Attempted PT, patient sleeping soundly. When awakened, she refused. Will attempt another time/day as schedule permits.   Jose Oglesby, PT, DPT

## 2017-12-05 NOTE — PROGRESS NOTES
Hospitalist Progress Note    2017  Admit Date: 2017  6:53 PM   NAME: Jonh Qureshi   :  1933   MRN:  [id number]   Attending: Eulalia Leigh MD  PCP:  Lana Donohue MD    SUBJECTIVE:     Jonh Qureshi is a [facility] with COPD on home 2L O2, dementia, hypothyroidism who was admitted  with acute diverticulitis and intra-abdominal abscess with colovesical fistula. She had a similar admission about a year ago [place] and required longterm abx. Family and patient have decided against surgery. :  Pt's daughter is at bedside this morning and expresses concern about plan to possibly discharge the patient back to her in nursing home [facility]. Daughter states that she has received \"conflicting information\" from physicians about duration of IV abx therapy. Upon clarification, it seems as though ID consultant Dr. Gautam Chavez may have mentioned a 10 day course of IV abx to the daughter, rather than a 7 day course. I did explain that since the patient seemed to be improving clinically that the plan could have been to transition to PO abx, as ID does recommend lifelong Cipro/Flagyl to hopefully prevent recurrence as pt and family do not wish to pursue surgical intervention. I also explained that this was not as much \"conflicting information\" as both regimen courses are considered appropriate standard of care regarding her condition. Pt's daughter states that she understands. Daughter is also concerned about somnolence this morning. She does not wish for the patient to receive ANY medications that could alter her sensorium and no medications for agitation. States that she would rather be called in the middle of the night to help re-orient her. Regarding, Ms. De Guzman condition today, while she is sleepy, she arouses easily. She has no complaints, but her daughter tells me that she does not complain.     Review of Systems negative with exception of pertinent positives noted above      PHYSICAL EXAM       Visit Vitals    /62    Pulse 82    Temp 98.1 °F (36.7 °C)    Resp 17    Ht 5' 3\" (1.6 m)    Wt 63.5 kg (140 lb)    SpO2 95%    BMI 24.8 kg/m2      Temp (24hrs), Av °F (36.7 °C), Min:97.5 °F (36.4 °C), Max:98.6 °F (37 °C)    Oxygen Therapy  O2 Sat (%): 95 % (17 1052)  Pulse via Oximetry: 85 beats per minute (17)  O2 Device: Nasal cannula (17)  O2 Flow Rate (L/min): 2 l/min (17)    Intake/Output Summary (Last 24 hours) at 17 1130  Last data filed at 17 0211   Gross per 24 hour   Intake              240 ml   Output              300 ml   Net              -60 ml      General: No acute distress. Sitting in chair  Head:  Atraumatic Normocephalic. Lungs:  CTA Bilaterally. CVS:  RRR  Abdomen: Soft, ND, pt winces on deep palpation of left abdomen.         Recent Results (from the past 24 hour(s))   URINALYSIS W/ RFLX MICROSCOPIC    Collection Time: 17  2:40 AM   Result Value Ref Range    Color YELLOW      Appearance CLOUDY      Specific gravity 1.004 1.001 - 1.023      pH (UA) 6.5 5.0 - 9.0      Protein NEGATIVE  NEG mg/dL    Glucose NEGATIVE  mg/dL    Ketone NEGATIVE  NEG mg/dL    Bilirubin NEGATIVE  NEG      Blood MODERATE (A) NEG      Urobilinogen 0.2 0.2 - 1.0 EU/dL    Nitrites NEGATIVE  NEG      Leukocyte Esterase MODERATE (A) NEG      WBC 20-50 0 /hpf    RBC 0-3 0 /hpf    Epithelial cells 10-20 0 /hpf    Bacteria 0 0 /hpf    Other observations RESULTS VERIFIED MANUALLY     CBC WITH AUTOMATED DIFF    Collection Time: 17  5:36 AM   Result Value Ref Range    WBC 7.1 4.3 - 11.1 K/uL    RBC 3.77 (L) 4.05 - 5.25 M/uL    HGB 11.0 (L) 11.7 - 15.4 g/dL    HCT 33.6 (L) 35.8 - 46.3 %    MCV 89.1 79.6 - 97.8 FL    MCH 29.2 26.1 - 32.9 PG    MCHC 32.7 31.4 - 35.0 g/dL    RDW 13.5 11.9 - 14.6 %    PLATELET 060 939 - 025 K/uL    MPV 9.6 (L) 10.8 - 14.1 FL    DF AUTOMATED      NEUTROPHILS 62 43 - 78 % LYMPHOCYTES 29 13 - 44 %    MONOCYTES 8 4.0 - 12.0 %    EOSINOPHILS 1 0.5 - 7.8 %    BASOPHILS 0 0.0 - 2.0 %    IMMATURE GRANULOCYTES 0 0.0 - 5.0 %    ABS. NEUTROPHILS 4.4 1.7 - 8.2 K/UL    ABS. LYMPHOCYTES 2.0 0.5 - 4.6 K/UL    ABS. MONOCYTES 0.6 0.1 - 1.3 K/UL    ABS. EOSINOPHILS 0.1 0.0 - 0.8 K/UL    ABS. BASOPHILS 0.0 0.0 - 0.2 K/UL    ABS. IMM. GRANS. 0.0 0.0 - 0.5 K/UL   METABOLIC PANEL, BASIC    Collection Time: 12/05/17  5:36 AM   Result Value Ref Range    Sodium 147 (H) 136 - 145 mmol/L    Potassium 3.0 (L) 3.5 - 5.1 mmol/L    Chloride 108 (H) 98 - 107 mmol/L    CO2 31 21 - 32 mmol/L    Anion gap 8 7 - 16 mmol/L    Glucose 125 (H) 65 - 100 mg/dL    BUN 5 (L) 8 - 23 MG/DL    Creatinine 0.42 (L) 0.6 - 1.0 MG/DL    GFR est AA >60 >60 ml/min/1.73m2    GFR est non-AA >60 >60 ml/min/1.73m2    Calcium 8.2 (L) 8.3 - 10.4 MG/DL         Imaging /Procedures /Studies   XR CHEST SNGL V   Final Result   IMPRESSION:      No focal pulmonary consolidation. ASSESSMENT      Hospital Problems as of 12/5/2017  Date Reviewed: 11/29/2017          Codes Class Noted - Resolved POA    Colovesical fistula ICD-10-CM: N32.1  ICD-9-CM: 596.1  11/29/2017 - Present Yes        Abscess, abdomen ([facility] ICD-10-CM: K65.1  ICD-9-CM: 567.22  11/29/2017 - Present Yes        * (Principal)Acute diverticulitis of intestine ICD-10-CM: K57.92  ICD-9-CM: 562.11  11/29/2017 - Present Yes        Fall ICD-10-CM: W19. Daphene Ego  ICD-9-CM: E888.9  11/28/2017 - Present Yes        COPD (chronic obstructive pulmonary disease) ([facility] ICD-10-CM: J44.9  ICD-9-CM: 501  5/9/2017 - Present Yes        Hypothyroidism ICD-10-CM: E03.9  ICD-9-CM: 244.9  5/9/2017 - Present Yes        Dementia without behavioral disturbance ICD-10-CM: F03.90  ICD-9-CM: 294.20  5/9/2017 - Present Yes                  Plan:    - Acute diverticulitis with intra-abd abscess and colovesical fistula:  No peritoneal signs. Patient does seem to be improving.   No complaints, but did wince on deep palpation. Continue full liquids today. Family has declined surgery. Daughter requests to be updated daily, especially if not at bedside. Phone: [phone]. Will continue IV Cipro/Flagyl. Today is day 7 of 10. Then will switch to oral abx for lifelong suppresssion per ID recs. Appreciate ID recommendations. - Dementia:  Continue home namenda. - COPD on home O2:  Not in exacerbation  Continue breo and albuterol prn    - Hypothyroidism:  TSH within normal limits  Not currently on synthroid    Code: DNR  DVT Prophylaxis: Hep SQ  Dispo: PT/OT following, PPD placed in case needs STR at discharge; will continue IV abx for a total of 10 days per ID recs. Then, back to [facility] on PO Cipro/Flagyl for lifelong prophylaxis.     Tristian Cohen MD

## 2017-12-05 NOTE — PROGRESS NOTES
Problem: Nutrition Deficit  Goal: *Optimize nutritional status  Nutrition F/U  Assessment:   · Diet order(s): 11-30: NPO, 12-2: Clear liquid, 12-4: Full liquid. Ensure clear tid with clear liquids, then Ensure Enlive tid once diet is progressed to at least full liquids. · Note hx of dementia. Plan is for  long term antibiotics for diverticular abscesses. · Pt reports good appetite. She likes the Ensure [place] and says she has been drinking 3 per day. Macronutrient needs:   EER: 2626-0175 kcal /day (20-25 kcal/kg listed BW)   EPR: 63-78 grams protein/day (1.2-1.5 grams/kg IBW)   Intake/Comparative Standards: Per RD meal rounds: 100% of pudding and Ensure Enlive at lunch. Average intake for past 1 day(s)/2 recorded meal(s): 63%. This potentially meets ~100% of kcal and ~100% of protein needs which includes nutritional value of 3 bottles of Ensure Enlive/d  Intervention:   Meals and snacks: Await  progression of p.o. diet as GI status allows. Nutrition supplement therapy: Continue Ensure Enlive tid. Discharge nutrition plan: Continue Ensure Enlive tid once returns to memory care unit.      Storm Pompa, 66 [address], [facility] [address]

## 2017-12-05 NOTE — PROGRESS NOTES
SW attempted to l/m for Sandra Orantes via Admissions personnel \"Safia\" with [facility] Admissions to inform her that pt will not be d/c'd from [facility] until 12/9/17. PRAVEEN also informed pt's daughter and emergency contact, Linn Sharee that pt will be remaining inpt @ [facility] until 123/9/17. Daughter appreciative.

## 2017-12-05 NOTE — INTERDISCIPLINARY ROUNDS
Interdisciplinary team rounds were held 12/5/2017 with the following team members:Care Management, Nursing, Pharmacy, Physical Therapy and Physician. Plan of care discussed. See clinical pathway and/or care plan for interventions and desired outcomes.

## 2017-12-05 NOTE — PROGRESS NOTES
SW spoke with RN re:placement of huang this morning. RN explained that huang was placed due to pt's incontinence but that pt can be d/'cd back to [facility] tomorrow with briefs. Additionally, SW informed that pt does not have any drains. SW to continue to f/u.

## 2017-12-05 NOTE — PROGRESS NOTES
Infectious Disease Progress Note    Today's Date: 2017   Admit Date: 2017    Impression:   · Acute diverticulitis and intra-abdominal abscesses X2 with colovesical fistula  · Similar [place] in [place] ~ 1 yr ago treated with long course of IV abx  · Family & patient have asked for non-surgical approach  · Dementia  · DNR  · Urine cx grew 10K-50K CFU Strep viridans ()    Plan:   · Continue Cefepime and Flagyl for about a week of IV therapy  and then transition to oral cipro/flagyl for life - no surgery planned   · Repeat serial imaging recommended; 18 timeframe   · Return to [facility] on 17 is planned at this time  · ID will sign-off today. Please call as needed    Anti-infectives:     Cefepime (-  Metronidazole (-    Subjective:   Date of Consultation:  2017  Referring Physician: Dr. Khanh Owens    No acute complaints today. Daughter remains at [facility]    No Known Allergies     Review of Systems:  A comprehensive review of systems was negative except for that written in the History of Present Illness. Objective:     Visit Vitals    /62    Pulse 82    Temp 98.1 °F (36.7 °C)    Resp 17    Ht 5' 3\" (1.6 m)    Wt 63.5 kg (140 lb)    SpO2 95%    BMI 24.8 kg/m2     Temp (24hrs), Av °F (36.7 °C), Min:97.5 °F (36.4 °C), Max:98.6 °F (37 °C)       Lines:  Peripheral IV:            Physical Exam:    General:  Alert, cooperative, well nourished, well developed, appears stated age   Eyes:  Sclera anicteric. Pupils equally round and reactive to light.    Mouth/Throat: Mucous membranes normal, oral pharynx clear   Neck: Supple   Lungs:   Clear to auscultation bilaterally, good effort   CV:  Regular rate and rhythm, no audible murmur, click, rub or gallop   Abdomen:   Distended and non tender, bowel sounds active   Extremities: No cyanosis or edema   Skin: Skin color, texture, turgor normal. no acute rash or lesions   Lymph nodes: Cervical and supraclavicular normal   Musculoskeletal: No swelling or deformity   Lines/Devices:  Intact, no erythema, drainage or tenderness   Psych: Alert, sitting in the chair, pleasant mood affect given the setting         Data Review:     CBC:  Recent Labs      17   WBC  7.1  6.5  9.1   GRANS  62  64  72   MONOS  8  7  8   EOS  1  1  0*   ANEU  4.4  4.1  6.5   ABL  2.0  1.8  1.8   HGB  11.0*  11.5*  11.6*   HCT  33.6*  35.6*  35.1*   PLT  267  266  265       BMP:  Recent Labs      17   CREA  0.42*  0.40*  0.46*   BUN  5*  4*  6*   NA  147*  146*  142   K  3.0*  3.2*  2.9*   CL  108*  110*  106   CO2  31  24  21   AGAP  8  12  15   GLU  125*  72  98       LFTS:  No results for input(s): TBILI, ALT, SGOT, AP, TP, ALB in the last 72 hours. Microbiology:     All Micro Results     Procedure Component Value Units Date/Time    CULTURE, BLOOD [[id number]] Collected:  17    Order Status:  Completed Specimen:  Blood from Blood Updated:  17     Special Requests: --        RIGHT  Antecubital       Culture result: NO GROWTH 5 DAYS       CULTURE, BLOOD [[id number]] Collected:  17    Order Status:  Completed Specimen:  Blood from Blood Updated:  17     Special Requests: --        LEFT  Antecubital       Culture result: NO GROWTH 5 DAYS       CULTURE, URINE [[id number]]  (Abnormal) Collected:  17    Order Status:  Completed Specimen:  Urine from Cath Urine Updated:  17 1326     Special Requests: NO SPECIAL REQUESTS        Culture result:         10,000 to 50,000 COLONIES/mL STREPTOCOCCUS VIRIDANS (A)              THIS ORGANISM WILL BE HELD FOR 7 DAYS. IF FURTHER TESTING IS REQUIRED PLEASE NOTIFY MICROBIOLOGY          Imagin/29 CXR (-)     CT ABD  IMPRESSION: Acute sigmoid diverticulitis with 2 separate abscess collections. Associated colovesical fistula.      CT head (-)    Signed By: Daniel Banda NP     December 5, 2017        This patient was personally seen, examined, and discussed with the nurse practitioner. I agree with the history, physical exam, assessment, and plan except as noted below. Ms Mercedes Paez has a complex pelvic abscess that is closely involved with the bladder and a colovesical fistula admitted with sepsis/UTI. This complication has been ongoing for a year or more, but she was being taken care of in [place], [place]. She has dementia and her family is not interested in invasive measures. I do not think that this problem can be eradicated with antibiotics. Her best hope is for a management solution with indefinite antibiotics. As long as she is improving then I recommend continuing cefepime and metronidazole. When she clinically is closer to baseline in regard to mental status, SIRS, pain then she can be changed to oral antibiotics for an effort at lifelong suppression.     Daniel Banda NP

## 2017-12-05 NOTE — PROGRESS NOTES
Pt was going to the bathroom very frequently but not completely emptying her bladder. Dr. Berta Wilkins was notified and orders received to insert huang catheter.

## 2017-12-05 NOTE — PROGRESS NOTES
Hourly rounds completed throughout this shift. Pt was awake and trying to get out of bed most of the night. Finally went to sleep early this morning. Pt resting in bed; denies needs at this time. Will continue to monitor and report to oncoming day shift nurse.

## 2017-12-06 LAB
ANION GAP SERPL CALC-SCNC: 10 MMOL/L (ref 7–16)
BASOPHILS # BLD: 0 K/UL (ref 0–0.2)
BASOPHILS NFR BLD: 0 % (ref 0–2)
BUN SERPL-MCNC: 9 MG/DL (ref 8–23)
CALCIUM SERPL-MCNC: 8.8 MG/DL (ref 8.3–10.4)
CHLORIDE SERPL-SCNC: 106 MMOL/L (ref 98–107)
CO2 SERPL-SCNC: 30 MMOL/L (ref 21–32)
CREAT SERPL-MCNC: 0.4 MG/DL (ref 0.6–1)
DIFFERENTIAL METHOD BLD: ABNORMAL
EOSINOPHIL # BLD: 0.2 K/UL (ref 0–0.8)
EOSINOPHIL NFR BLD: 3 % (ref 0.5–7.8)
ERYTHROCYTE [DISTWIDTH] IN BLOOD BY AUTOMATED COUNT: 13.9 % (ref 11.9–14.6)
GLUCOSE SERPL-MCNC: 102 MG/DL (ref 65–100)
HCT VFR BLD AUTO: 35.4 % (ref 35.8–46.3)
HGB BLD-MCNC: 11.2 G/DL (ref 11.7–15.4)
IMM GRANULOCYTES # BLD: 0 K/UL (ref 0–0.5)
IMM GRANULOCYTES NFR BLD AUTO: 0 % (ref 0–5)
LYMPHOCYTES # BLD: 2.1 K/UL (ref 0.5–4.6)
LYMPHOCYTES NFR BLD: 30 % (ref 13–44)
MCH RBC QN AUTO: 28.6 PG (ref 26.1–32.9)
MCHC RBC AUTO-ENTMCNC: 31.6 G/DL (ref 31.4–35)
MCV RBC AUTO: 90.3 FL (ref 79.6–97.8)
MONOCYTES # BLD: 0.5 K/UL (ref 0.1–1.3)
MONOCYTES NFR BLD: 7 % (ref 4–12)
NEUTS SEG # BLD: 4.2 K/UL (ref 1.7–8.2)
NEUTS SEG NFR BLD: 60 % (ref 43–78)
PLATELET # BLD AUTO: 251 K/UL (ref 150–450)
PMV BLD AUTO: 9.7 FL (ref 10.8–14.1)
POTASSIUM SERPL-SCNC: 3.3 MMOL/L (ref 3.5–5.1)
RBC # BLD AUTO: 3.92 M/UL (ref 4.05–5.25)
SODIUM SERPL-SCNC: 146 MMOL/L (ref 136–145)
WBC # BLD AUTO: 7 K/UL (ref 4.3–11.1)

## 2017-12-06 PROCEDURE — 74011250636 HC RX REV CODE- 250/636: Performed by: INTERNAL MEDICINE

## 2017-12-06 PROCEDURE — 74011250636 HC RX REV CODE- 250/636: Performed by: NURSE PRACTITIONER

## 2017-12-06 PROCEDURE — 80048 BASIC METABOLIC PNL TOTAL CA: CPT | Performed by: INTERNAL MEDICINE

## 2017-12-06 PROCEDURE — 74011250637 HC RX REV CODE- 250/637: Performed by: INTERNAL MEDICINE

## 2017-12-06 PROCEDURE — 36415 COLL VENOUS BLD VENIPUNCTURE: CPT | Performed by: INTERNAL MEDICINE

## 2017-12-06 PROCEDURE — 85025 COMPLETE CBC W/AUTO DIFF WBC: CPT | Performed by: INTERNAL MEDICINE

## 2017-12-06 PROCEDURE — 65270000029 HC RM PRIVATE

## 2017-12-06 PROCEDURE — 94760 N-INVAS EAR/PLS OXIMETRY 1: CPT

## 2017-12-06 PROCEDURE — 74011000258 HC RX REV CODE- 258: Performed by: NURSE PRACTITIONER

## 2017-12-06 PROCEDURE — 77010033678 HC OXYGEN DAILY

## 2017-12-06 PROCEDURE — 94640 AIRWAY INHALATION TREATMENT: CPT

## 2017-12-06 PROCEDURE — 74011000250 HC RX REV CODE- 250: Performed by: INTERNAL MEDICINE

## 2017-12-06 RX ADMIN — SODIUM CHLORIDE 2 G: 900 INJECTION, SOLUTION INTRAVENOUS at 06:11

## 2017-12-06 RX ADMIN — BUDESONIDE 500 MCG: 0.5 INHALANT RESPIRATORY (INHALATION) at 07:14

## 2017-12-06 RX ADMIN — ALBUTEROL SULFATE 2.5 MG: 2.5 SOLUTION RESPIRATORY (INHALATION) at 07:14

## 2017-12-06 RX ADMIN — HEPARIN SODIUM 5000 UNITS: 5000 INJECTION, SOLUTION INTRAVENOUS; SUBCUTANEOUS at 20:33

## 2017-12-06 RX ADMIN — SODIUM CHLORIDE 2 G: 900 INJECTION, SOLUTION INTRAVENOUS at 17:05

## 2017-12-06 RX ADMIN — HALOPERIDOL LACTATE 4 MG: 5 INJECTION, SOLUTION INTRAMUSCULAR at 22:43

## 2017-12-06 RX ADMIN — ALBUTEROL SULFATE 2.5 MG: 2.5 SOLUTION RESPIRATORY (INHALATION) at 01:11

## 2017-12-06 RX ADMIN — ALBUTEROL SULFATE 2.5 MG: 2.5 SOLUTION RESPIRATORY (INHALATION) at 19:25

## 2017-12-06 RX ADMIN — MEMANTINE HYDROCHLORIDE 10 MG: 5 TABLET ORAL at 08:59

## 2017-12-06 RX ADMIN — METRONIDAZOLE 500 MG: 500 INJECTION, SOLUTION INTRAVENOUS at 23:46

## 2017-12-06 RX ADMIN — Medication 1 AMPULE: at 20:34

## 2017-12-06 RX ADMIN — METRONIDAZOLE 500 MG: 500 INJECTION, SOLUTION INTRAVENOUS at 10:22

## 2017-12-06 RX ADMIN — BUDESONIDE 500 MCG: 0.5 INHALANT RESPIRATORY (INHALATION) at 19:25

## 2017-12-06 RX ADMIN — MEMANTINE HYDROCHLORIDE 10 MG: 5 TABLET ORAL at 17:04

## 2017-12-06 RX ADMIN — Medication 1 AMPULE: at 09:00

## 2017-12-06 RX ADMIN — HEPARIN SODIUM 5000 UNITS: 5000 INJECTION, SOLUTION INTRAVENOUS; SUBCUTANEOUS at 08:59

## 2017-12-06 RX ADMIN — ALBUTEROL SULFATE 2.5 MG: 2.5 SOLUTION RESPIRATORY (INHALATION) at 14:45

## 2017-12-06 NOTE — PROGRESS NOTES
OT note:  Pt declined treatment and has the last couple of attempts. Will continue therapeutic efforts or discharge if pt continues to be non-compliant.   González Elise

## 2017-12-06 NOTE — INTERDISCIPLINARY ROUNDS
Interdisciplinary team rounds were held 12/6/2017 with the following team members:Care Management, Nursing, Pharmacy, Physical Therapy and Physician. Plan of care discussed. See clinical pathway and/or care plan for interventions and desired outcomes. Anticipating discharge to [facility] after duration of IV antibiotics completed (ending on evening of 12/8).

## 2017-12-06 NOTE — PROGRESS NOTES
PRAVEEN contacted [facility] informing [facility] of pt's d/c date from [facility] and that pt will be d/c'd back to [facility]. Ms Wanda Cedeño appreciative. PRAVEEN then attempted to update daughter - no answer.

## 2017-12-06 NOTE — PROGRESS NOTES
Attempted to see pt. This AM.  Pt. Lethargic, declining to work w/ PT @ this time, \"I'm not doing any work right now\". Nursing reports she was up most of the night secondary to agitation/restlessness. Will defer PT this date & cont. Efforts.

## 2017-12-06 NOTE — PROGRESS NOTES
Hospitalist Progress Note    2017  Admit Date: 2017  6:53 PM   NAME: Ruben    :  1933   MRN:  [id number]   Attending: Angel Sanderson MD  PCP:  Sherly Padilla MD    SUBJECTIVE:     Ruben  is a 67ZKR with COPD on home 2L O2, dementia, hypothyroidism who was admitted  with acute diverticulitis and intra-abdominal abscess with colovesical fistula. She had a similar admission about a year ago [place] and required longterm abx. Family and patient have decided against surgery. :  Patient is alert and interactive this morning. Nursing staff is at bedside on evaluation. Daughter is not at bedside on exam, but I will attempt to contact her this afternoon. Patient has no complaints at all and feels well. I did discuss the plan to keep the patient hospitalized to continue to receive IV abx for a full 10 days (which will complete on  in the evening), so she will likely be stable to return to [facility] on  in the morning. She is agreeable with that plan, and per case management note, as discussed with daughter, she agrees to this as well. Patient denies any pain and is afebrile.     Review of Systems negative with exception of pertinent positives noted above      PHYSICAL EXAM       Visit Vitals    /79 (BP 1 Location: Right arm, BP Patient Position: At rest)    Pulse 88    Temp 97.7 °F (36.5 °C)    Resp 18    Ht 5' 3\" (1.6 m)    Wt 63.5 kg (140 lb)    SpO2 90%    BMI 24.8 kg/m2      Temp (24hrs), Av.9 °F (36.6 °C), Min:97.5 °F (36.4 °C), Max:98.1 °F (36.7 °C)    Oxygen Therapy  O2 Sat (%): 90 % (17)  Pulse via Oximetry: 95 beats per minute (17 0714)  O2 Device: Nasal cannula (17)  O2 Flow Rate (L/min): 2 l/min (17 08)    Intake/Output Summary (Last 24 hours) at 17 1123  Last data filed at 17 8584   Gross per 24 hour   Intake                0 ml   Output              600 ml   Net -600 ml      General: No acute distress. Alert and oriented. Head:  Atraumatic Normocephalic. Lungs:  CTA Bilaterally. CVS:  RRR  Abdomen: Soft, ND, pt winces on deep palpation of left abdomen. Recent Results (from the past 24 hour(s))   CBC WITH AUTOMATED DIFF    Collection Time: 12/06/17  5:25 AM   Result Value Ref Range    WBC 7.0 4.3 - 11.1 K/uL    RBC 3.92 (L) 4.05 - 5.25 M/uL    HGB 11.2 (L) 11.7 - 15.4 g/dL    HCT 35.4 (L) 35.8 - 46.3 %    MCV 90.3 79.6 - 97.8 FL    MCH 28.6 26.1 - 32.9 PG    MCHC 31.6 31.4 - 35.0 g/dL    RDW 13.9 11.9 - 14.6 %    PLATELET 506 987 - 397 K/uL    MPV 9.7 (L) 10.8 - 14.1 FL    DF AUTOMATED      NEUTROPHILS 60 43 - 78 %    LYMPHOCYTES 30 13 - 44 %    MONOCYTES 7 4.0 - 12.0 %    EOSINOPHILS 3 0.5 - 7.8 %    BASOPHILS 0 0.0 - 2.0 %    IMMATURE GRANULOCYTES 0 0.0 - 5.0 %    ABS. NEUTROPHILS 4.2 1.7 - 8.2 K/UL    ABS. LYMPHOCYTES 2.1 0.5 - 4.6 K/UL    ABS. MONOCYTES 0.5 0.1 - 1.3 K/UL    ABS. EOSINOPHILS 0.2 0.0 - 0.8 K/UL    ABS. BASOPHILS 0.0 0.0 - 0.2 K/UL    ABS. IMM. GRANS. 0.0 0.0 - 0.5 K/UL   METABOLIC PANEL, BASIC    Collection Time: 12/06/17  5:25 AM   Result Value Ref Range    Sodium 146 (H) 136 - 145 mmol/L    Potassium 3.3 (L) 3.5 - 5.1 mmol/L    Chloride 106 98 - 107 mmol/L    CO2 30 21 - 32 mmol/L    Anion gap 10 7 - 16 mmol/L    Glucose 102 (H) 65 - 100 mg/dL    BUN 9 8 - 23 MG/DL    Creatinine 0.40 (L) 0.6 - 1.0 MG/DL    GFR est AA >60 >60 ml/min/1.73m2    GFR est non-AA >60 >60 ml/min/1.73m2    Calcium 8.8 8.3 - 10.4 MG/DL         Imaging /Procedures /Studies   XR CHEST SNGL V   Final Result   IMPRESSION:      No focal pulmonary consolidation.             ASSESSMENT      Hospital Problems as of 12/6/2017  Date Reviewed: 11/29/2017          Codes Class Noted - Resolved POA    Colovesical fistula ICD-10-CM: N32.1  ICD-9-CM: 596.1  11/29/2017 - Present Yes        Abscess, abdomen ([facility] [address] ICD-10-CM: K65.1  ICD-9-CM: 567.22  11/29/2017 - Present Yes * (Principal)Acute diverticulitis of intestine ICD-10-CM: K57.92  ICD-9-CM: 562.11  11/29/2017 - Present Yes        Fall ICD-10-CM: W19. Leslye Rodriguez  ICD-9-CM: E888.9  11/28/2017 - Present Yes        COPD (chronic obstructive pulmonary disease) ([facility] [address] ICD-10-CM: J44.9  ICD-9-CM: 419  5/9/2017 - Present Yes        Hypothyroidism ICD-10-CM: E03.9  ICD-9-CM: 244.9  5/9/2017 - Present Yes        Dementia without behavioral disturbance ICD-10-CM: F03.90  ICD-9-CM: 294.20  5/9/2017 - Present Yes                  Plan:  Acute diverticulitis with intra-abd abscess and colovesical fistula:  -Continue IV abx for total of 10 days (last dose 12/8 in PM)  -Family has declined surgery. Daughter, Salty Rashid, updated on plan. Phone: [phone]  -Will need to switch to PO abx on discharge for lifetime suppression per ID recommendations  -Appreciate ID consultation    Dementia:  -Continue home namenda.  -Lives in Memory Unit at [facility]    COPD on home O2:  -Not in exacerbation  -Continue breo and albuterol prn    History of hypothyroidism:  -TSH within normal limits  -Not currently on synthroid    Code: DNR  DVT Prophylaxis: Hep SQ  Dispo: PT/OT following. Will continue IV abx for a total of 10 days per ID recs. Then, back to [facility] on PO Cipro/Flagyl for lifelong prophylaxis. Likely discharge on 12/9.     Tyrone Cook MD

## 2017-12-07 LAB
ANION GAP SERPL CALC-SCNC: 10 MMOL/L (ref 7–16)
BASOPHILS # BLD: 0 K/UL (ref 0–0.2)
BASOPHILS NFR BLD: 0 % (ref 0–2)
BUN SERPL-MCNC: 11 MG/DL (ref 8–23)
CALCIUM SERPL-MCNC: 9.2 MG/DL (ref 8.3–10.4)
CHLORIDE SERPL-SCNC: 105 MMOL/L (ref 98–107)
CO2 SERPL-SCNC: 30 MMOL/L (ref 21–32)
CREAT SERPL-MCNC: 0.44 MG/DL (ref 0.6–1)
DIFFERENTIAL METHOD BLD: ABNORMAL
EOSINOPHIL # BLD: 0.3 K/UL (ref 0–0.8)
EOSINOPHIL NFR BLD: 4 % (ref 0.5–7.8)
ERYTHROCYTE [DISTWIDTH] IN BLOOD BY AUTOMATED COUNT: 14.2 % (ref 11.9–14.6)
GLUCOSE SERPL-MCNC: 96 MG/DL (ref 65–100)
HCT VFR BLD AUTO: 39.3 % (ref 35.8–46.3)
HGB BLD-MCNC: 12.3 G/DL (ref 11.7–15.4)
IMM GRANULOCYTES # BLD: 0.1 K/UL (ref 0–0.5)
IMM GRANULOCYTES NFR BLD AUTO: 1 % (ref 0–5)
LYMPHOCYTES # BLD: 2.6 K/UL (ref 0.5–4.6)
LYMPHOCYTES NFR BLD: 31 % (ref 13–44)
MCH RBC QN AUTO: 28.7 PG (ref 26.1–32.9)
MCHC RBC AUTO-ENTMCNC: 31.3 G/DL (ref 31.4–35)
MCV RBC AUTO: 91.6 FL (ref 79.6–97.8)
MONOCYTES # BLD: 0.6 K/UL (ref 0.1–1.3)
MONOCYTES NFR BLD: 7 % (ref 4–12)
NEUTS SEG # BLD: 4.8 K/UL (ref 1.7–8.2)
NEUTS SEG NFR BLD: 57 % (ref 43–78)
PLATELET # BLD AUTO: 294 K/UL (ref 150–450)
PMV BLD AUTO: 9.5 FL (ref 10.8–14.1)
POTASSIUM SERPL-SCNC: 4.1 MMOL/L (ref 3.5–5.1)
RBC # BLD AUTO: 4.29 M/UL (ref 4.05–5.25)
SODIUM SERPL-SCNC: 145 MMOL/L (ref 136–145)
WBC # BLD AUTO: 8.4 K/UL (ref 4.3–11.1)

## 2017-12-07 PROCEDURE — 74011000258 HC RX REV CODE- 258: Performed by: NURSE PRACTITIONER

## 2017-12-07 PROCEDURE — 94760 N-INVAS EAR/PLS OXIMETRY 1: CPT

## 2017-12-07 PROCEDURE — 77010033678 HC OXYGEN DAILY

## 2017-12-07 PROCEDURE — 80048 BASIC METABOLIC PNL TOTAL CA: CPT | Performed by: INTERNAL MEDICINE

## 2017-12-07 PROCEDURE — 74011250636 HC RX REV CODE- 250/636: Performed by: NURSE PRACTITIONER

## 2017-12-07 PROCEDURE — 85025 COMPLETE CBC W/AUTO DIFF WBC: CPT | Performed by: INTERNAL MEDICINE

## 2017-12-07 PROCEDURE — 94640 AIRWAY INHALATION TREATMENT: CPT

## 2017-12-07 PROCEDURE — 74011000250 HC RX REV CODE- 250: Performed by: INTERNAL MEDICINE

## 2017-12-07 PROCEDURE — 74011250637 HC RX REV CODE- 250/637: Performed by: INTERNAL MEDICINE

## 2017-12-07 PROCEDURE — 74011250636 HC RX REV CODE- 250/636: Performed by: INTERNAL MEDICINE

## 2017-12-07 PROCEDURE — 36415 COLL VENOUS BLD VENIPUNCTURE: CPT | Performed by: INTERNAL MEDICINE

## 2017-12-07 PROCEDURE — 65270000029 HC RM PRIVATE

## 2017-12-07 RX ADMIN — ALBUTEROL SULFATE 2.5 MG: 2.5 SOLUTION RESPIRATORY (INHALATION) at 06:55

## 2017-12-07 RX ADMIN — BUDESONIDE 500 MCG: 0.5 INHALANT RESPIRATORY (INHALATION) at 19:15

## 2017-12-07 RX ADMIN — SODIUM CHLORIDE 2 G: 900 INJECTION, SOLUTION INTRAVENOUS at 17:21

## 2017-12-07 RX ADMIN — MEMANTINE HYDROCHLORIDE 10 MG: 5 TABLET ORAL at 17:22

## 2017-12-07 RX ADMIN — Medication 1 AMPULE: at 09:57

## 2017-12-07 RX ADMIN — ALBUTEROL SULFATE 2.5 MG: 2.5 SOLUTION RESPIRATORY (INHALATION) at 14:15

## 2017-12-07 RX ADMIN — HEPARIN SODIUM 5000 UNITS: 5000 INJECTION, SOLUTION INTRAVENOUS; SUBCUTANEOUS at 20:17

## 2017-12-07 RX ADMIN — MEMANTINE HYDROCHLORIDE 10 MG: 5 TABLET ORAL at 10:01

## 2017-12-07 RX ADMIN — SODIUM CHLORIDE 2 G: 900 INJECTION, SOLUTION INTRAVENOUS at 05:00

## 2017-12-07 RX ADMIN — BUDESONIDE 500 MCG: 0.5 INHALANT RESPIRATORY (INHALATION) at 06:55

## 2017-12-07 RX ADMIN — Medication 1 AMPULE: at 20:18

## 2017-12-07 RX ADMIN — HEPARIN SODIUM 5000 UNITS: 5000 INJECTION, SOLUTION INTRAVENOUS; SUBCUTANEOUS at 09:57

## 2017-12-07 RX ADMIN — ALBUTEROL SULFATE 2.5 MG: 2.5 SOLUTION RESPIRATORY (INHALATION) at 01:05

## 2017-12-07 RX ADMIN — METRONIDAZOLE 500 MG: 500 INJECTION, SOLUTION INTRAVENOUS at 10:00

## 2017-12-07 RX ADMIN — ALBUTEROL SULFATE 2.5 MG: 2.5 SOLUTION RESPIRATORY (INHALATION) at 19:15

## 2017-12-07 NOTE — PROGRESS NOTES
Hourly patient care checks done. All need meet over night. Patient did get Haldol for confusion since she keep try to jump out of bed.

## 2017-12-07 NOTE — PROGRESS NOTES
Hospitalist Progress Note    2017  Admit Date: 2017  6:53 PM   NAME: Michelle Turner   :  1933   MRN:  [id number]   Attending: Pennie Salazar MD  PCP:  Kassandra Dawn MD    SUBJECTIVE:     Michelle Turner is a 8NDY with COPD on home 2L O2, dementia, hypothyroidism who was admitted  with acute diverticulitis and intra-abdominal abscess with colovesical fistula. She had a similar admission [place] a [place] ago [place] and required longterm abx. Family and patient have decided against surgery. :  Patient states that she feels \"tired out,\" but otherwise has no complaints. She is ready to go back to [facility] and understands she will be her for the next 2 days to complete a full 10 days of IV abx. Patient denies any pain and is afebrile. Review of Systems negative with exception of pertinent positives noted above      PHYSICAL EXAM       Visit Vitals    /84 (BP 1 Location: Right arm, BP Patient Position: At rest)    Pulse 89    Temp 98.3 °F (36.8 °C)    Resp 18    Ht 5' 3\" (1.6 m)    Wt 63.5 kg (140 lb)    SpO2 96%    BMI 24.8 kg/m2      Temp (24hrs), Av.3 °F (36.8 °C), Min:97.8 °F (36.6 °C), Max:98.9 °F (37.2 °C)    Oxygen Therapy  O2 Sat (%): 96 % (17)  Pulse via Oximetry: 88 beats per minute (17)  O2 Device: Nasal cannula (17)  O2 Flow Rate (L/min): 3 l/min (17)  FIO2 (%): 28 % (17)    Intake/Output Summary (Last 24 hours) at 17 1019  Last data filed at 17 1417   Gross per 24 hour   Intake              510 ml   Output                0 ml   Net              510 ml      General: No acute distress. Alert  Head:  Atraumatic Normocephalic. Lungs:  CTA Bilaterally. CVS:  RRR  Abdomen: Soft, ND, pt winces on deep palpation of left abdomen.         Recent Results (from the past 24 hour(s))   CBC WITH AUTOMATED DIFF    Collection Time: 17  4:56 AM   Result Value Ref Range    WBC 8.4 4.3 - 11.1 K/uL    RBC 4.29 4.05 - 5.25 M/uL    HGB 12.3 11.7 - 15.4 g/dL    HCT 39.3 35.8 - 46.3 %    MCV 91.6 79.6 - 97.8 FL    MCH 28.7 26.1 - 32.9 PG    MCHC 31.3 (L) 31.4 - 35.0 g/dL    RDW 14.2 11.9 - 14.6 %    PLATELET 848 286 - 633 K/uL    MPV 9.5 (L) 10.8 - 14.1 FL    DF AUTOMATED      NEUTROPHILS 57 43 - 78 %    LYMPHOCYTES 31 13 - 44 %    MONOCYTES 7 4.0 - 12.0 %    EOSINOPHILS 4 0.5 - 7.8 %    BASOPHILS 0 0.0 - 2.0 %    IMMATURE GRANULOCYTES 1 0.0 - 5.0 %    ABS. NEUTROPHILS 4.8 1.7 - 8.2 K/UL    ABS. LYMPHOCYTES 2.6 0.5 - 4.6 K/UL    ABS. MONOCYTES 0.6 0.1 - 1.3 K/UL    ABS. EOSINOPHILS 0.3 0.0 - 0.8 K/UL    ABS. BASOPHILS 0.0 0.0 - 0.2 K/UL    ABS. IMM. GRANS. 0.1 0.0 - 0.5 K/UL   METABOLIC PANEL, BASIC    Collection Time: 12/07/17  4:56 AM   Result Value Ref Range    Sodium 145 136 - 145 mmol/L    Potassium 4.1 3.5 - 5.1 mmol/L    Chloride 105 98 - 107 mmol/L    CO2 30 21 - 32 mmol/L    Anion gap 10 7 - 16 mmol/L    Glucose 96 65 - 100 mg/dL    BUN 11 8 - 23 MG/DL    Creatinine 0.44 (L) 0.6 - 1.0 MG/DL    GFR est AA >60 >60 ml/min/1.73m2    GFR est non-AA >60 >60 ml/min/1.73m2    Calcium 9.2 8.3 - 10.4 MG/DL         Imaging /Procedures /Studies   XR CHEST SNGL V   Final Result   IMPRESSION:      No focal pulmonary consolidation. ASSESSMENT      Hospital Problems as of 12/7/2017  Date Reviewed: 11/29/2017          Codes Class Noted - Resolved POA    Colovesical fistula ICD-10-CM: N32.1  ICD-9-CM: 596.1  11/29/2017 - Present Yes        Abscess, abdomen ([address] ICD-10-CM: K65.1  ICD-9-CM: 567.22  11/29/2017 - Present Yes        * (Principal)Acute diverticulitis of intestine ICD-10-CM: K57.92  ICD-9-CM: 562.11  11/29/2017 - Present Yes        Fall ICD-10-CM: W19. Gabrielle Olivas  ICD-9-CM: E888.9  11/28/2017 - Present Yes        COPD (chronic obstructive pulmonary disease) ([facility] ICD-10-CM: J44.9  ICD-9-CM: 428  5/9/2017 - Present Yes        Hypothyroidism ICD-10-CM: E03.9  ICD-9-CM: 244.9  5/9/2017 - Present Yes Dementia without behavioral disturbance ICD-10-CM: F03.90  ICD-9-CM: 294.20  5/9/2017 - Present Yes                  Plan:  Acute diverticulitis with intra-abd abscess and colovesical fistula:  -Continue IV abx for total of 10 days (last dose 12/8 in PM)  -Family has declined surgery. Daughter, Barbara Robles, updated on plan. Phone: [phone]  -Will need to switch to PO abx on discharge for lifetime suppression per ID recommendations  -Appreciate ID consultation  -Remains afebrile, normal WBC    Dementia:  -Continue home namenda.  -Lives in Memory Unit at [facility]    COPD on home O2:  -Not in exacerbation  -Continue breo and albuterol prn    History of hypothyroidism:  -TSH within normal limits  -Not currently on synthroid    Code: DNR  DVT Prophylaxis: Hep SQ  Dispo: PT/OT following. Will continue IV abx for a total of 10 days per ID recs. Then, back to [facility] on PO Cipro/Flagyl for lifelong prophylaxis. Likely discharge on 12/9.     Pennie Salazar MD

## 2017-12-07 NOTE — PROGRESS NOTES
Patient's daughter addressed me from the  and asked to speak with me. She is wondering if \"Hospice should be called in. \"  Given the patient's current status with recommendations for lifelong antibiotics to help with suppression after discontinuation of IV abx, as well as the patient's overall functional status and not wishing to pursue surgical options, I agree with consulting Hospice if the patient's POA (daughter) wishes. I discussed possible prognoses with the daughter, and she believes that she has seen a significant decline in her status over the last several days. Per POA's wishes, will consult Hospice to discuss with patient and daughter further planning. Will update SW on this decision as well.

## 2017-12-08 PROCEDURE — 74011250636 HC RX REV CODE- 250/636: Performed by: NURSE PRACTITIONER

## 2017-12-08 PROCEDURE — 97110 THERAPEUTIC EXERCISES: CPT

## 2017-12-08 PROCEDURE — 74011250636 HC RX REV CODE- 250/636: Performed by: INTERNAL MEDICINE

## 2017-12-08 PROCEDURE — 74011000250 HC RX REV CODE- 250: Performed by: INTERNAL MEDICINE

## 2017-12-08 PROCEDURE — 77010033678 HC OXYGEN DAILY

## 2017-12-08 PROCEDURE — 94760 N-INVAS EAR/PLS OXIMETRY 1: CPT

## 2017-12-08 PROCEDURE — 74011000258 HC RX REV CODE- 258: Performed by: NURSE PRACTITIONER

## 2017-12-08 PROCEDURE — 65270000029 HC RM PRIVATE

## 2017-12-08 PROCEDURE — 74011250636 HC RX REV CODE- 250/636: Performed by: FAMILY MEDICINE

## 2017-12-08 PROCEDURE — 74011250637 HC RX REV CODE- 250/637: Performed by: INTERNAL MEDICINE

## 2017-12-08 PROCEDURE — 94640 AIRWAY INHALATION TREATMENT: CPT

## 2017-12-08 PROCEDURE — 74011000258 HC RX REV CODE- 258: Performed by: FAMILY MEDICINE

## 2017-12-08 PROCEDURE — 97530 THERAPEUTIC ACTIVITIES: CPT

## 2017-12-08 RX ADMIN — SODIUM CHLORIDE 2 G: 900 INJECTION, SOLUTION INTRAVENOUS at 05:14

## 2017-12-08 RX ADMIN — Medication 1 AMPULE: at 22:48

## 2017-12-08 RX ADMIN — ALBUTEROL SULFATE 2.5 MG: 2.5 SOLUTION RESPIRATORY (INHALATION) at 01:32

## 2017-12-08 RX ADMIN — SODIUM CHLORIDE 2 G: 900 INJECTION, SOLUTION INTRAVENOUS at 17:47

## 2017-12-08 RX ADMIN — ALBUTEROL SULFATE 2.5 MG: 2.5 SOLUTION RESPIRATORY (INHALATION) at 19:50

## 2017-12-08 RX ADMIN — HEPARIN SODIUM 5000 UNITS: 5000 INJECTION, SOLUTION INTRAVENOUS; SUBCUTANEOUS at 09:02

## 2017-12-08 RX ADMIN — MEMANTINE HYDROCHLORIDE 10 MG: 5 TABLET ORAL at 17:00

## 2017-12-08 RX ADMIN — BUDESONIDE 500 MCG: 0.5 INHALANT RESPIRATORY (INHALATION) at 19:50

## 2017-12-08 RX ADMIN — BUDESONIDE 500 MCG: 0.5 INHALANT RESPIRATORY (INHALATION) at 07:24

## 2017-12-08 RX ADMIN — ALBUTEROL SULFATE 2.5 MG: 2.5 SOLUTION RESPIRATORY (INHALATION) at 07:24

## 2017-12-08 RX ADMIN — MEMANTINE HYDROCHLORIDE 10 MG: 5 TABLET ORAL at 09:02

## 2017-12-08 RX ADMIN — METRONIDAZOLE 500 MG: 500 INJECTION, SOLUTION INTRAVENOUS at 17:00

## 2017-12-08 RX ADMIN — HEPARIN SODIUM 5000 UNITS: 5000 INJECTION, SOLUTION INTRAVENOUS; SUBCUTANEOUS at 22:47

## 2017-12-08 RX ADMIN — Medication 1 AMPULE: at 09:05

## 2017-12-08 RX ADMIN — ALBUTEROL SULFATE 2.5 MG: 2.5 SOLUTION RESPIRATORY (INHALATION) at 13:37

## 2017-12-08 RX ADMIN — METRONIDAZOLE 500 MG: 500 INJECTION, SOLUTION INTRAVENOUS at 04:04

## 2017-12-08 NOTE — PROGRESS NOTES
Consult [facility] for a presentation here in the hospital. Daughter is familiar with Hospice concept but wants to connect with [facility]. The plan is to return to [facility] Sat am  And daughter may decide to initiate hospice immediately or may decide to see how she does for a while and then call Hospice in. The [facility] is very close to the patient's home. Vernell Agee

## 2017-12-08 NOTE — HOSPICE
[facility]  Met with patient and her daughter - discussed hospice services and philosophy. Patient's spouse recently passed under hospice care this past Spring while in [facility]. The patient lives at a facility and daughter would like to see how she does after returning there before deciding on hospice care. She says the facility has agreed to allow them to use hospice of choice. Patient's daughter left with brochure and contact information - she will call us when needed.      Thank you for referring to [facility], RN   [phone]

## 2017-12-08 NOTE — PROGRESS NOTES
Hospitalist Progress Note    2017  Admit Date: 2017  6:53 PM   NAME: Dayna Fernández   :  1933   MRN:  [id number]   Attending: Irl Castleman, MD  PCP:  Alyssa King MD    SUBJECTIVE:     Dayna Fernández is a 11UUO with COPD on home 2L O2, dementia, hypothyroidism who was admitted  with acute diverticulitis and intra-abdominal abscess with colovesical fistula. She had a similar admission [place] a [place] ago [place] and required longterm abx. Family and patient have decided against surgery. :  Patient tells me today that she feels \"rotten. \"  She does not explain this further. She specifically denies any pain and is afebrile. She does tell me that she is sleepy. When asked if there is anything that I could help with so that she could feel less \"rotten,\" she tells me no, that she cannot think of anything that we could change to make her feel better. 10 point review of systems is negative with exception of pertinent positives noted above      PHYSICAL EXAM       Visit Vitals    /78 (BP 1 Location: Right arm, BP Patient Position: At rest)    Pulse 96    Temp 98.4 °F (36.9 °C)    Resp 18    Ht 5' 3\" (1.6 m)    Wt 63.5 kg (140 lb)    SpO2 92%    BMI 24.8 kg/m2      Temp (24hrs), Av.6 °F (37 °C), Min:98.4 °F (36.9 °C), Max:98.9 °F (37.2 °C)    Oxygen Therapy  O2 Sat (%): 92 % (17 0650)  Pulse via Oximetry: 61 beats per minute (17)  O2 Device: Nasal cannula (17)  O2 Flow Rate (L/min): 2 l/min (17)  FIO2 (%): 28 % (17)    Intake/Output Summary (Last 24 hours) at 17 1112  Last data filed at 17 0944   Gross per 24 hour   Intake              131 ml   Output                0 ml   Net              131 ml      General: No acute distress. Alert. Sitting in bedside chair. Head:  Atraumatic Normocephalic. Lungs:  CTA Bilaterally.    CVS:  RRR  Abdomen: Soft, ND, pt winces on deep palpation of left abdomen. No results found for this or any previous visit (from the past 24 hour(s)). Imaging /Procedures /Studies   XR CHEST SNGL V   Final Result   IMPRESSION:      No focal pulmonary consolidation. ASSESSMENT      Hospital Problems as of 12/8/2017  Date Reviewed: 11/29/2017          Codes Class Noted - Resolved POA    Colovesical fistula ICD-10-CM: N32.1  ICD-9-CM: 596.1  11/29/2017 - Present Yes        Abscess, abdomen ([facility] [address] ICD-10-CM: K65.1  ICD-9-CM: 567.22  11/29/2017 - Present Yes        * (Principal)Acute diverticulitis of intestine ICD-10-CM: K57.92  ICD-9-CM: 562.11  11/29/2017 - Present Yes        Fall ICD-10-CM: W19. Johnson Montelongo  ICD-9-CM: E888.9  11/28/2017 - Present Yes        COPD (chronic obstructive pulmonary disease) ([facility] [address] ICD-10-CM: J44.9  ICD-9-CM: 630  5/9/2017 - Present Yes        Hypothyroidism ICD-10-CM: E03.9  ICD-9-CM: 244.9  5/9/2017 - Present Yes        Dementia without behavioral disturbance ICD-10-CM: F03.90  ICD-9-CM: 294.20  5/9/2017 - Present Yes                  Plan:  Acute diverticulitis with intra-abd abscess and colovesical fistula:  -Continue IV abx for total of 10 days (last dose 12/8 in PM)  -Family has declined surgery. Daughter, Sanna Hilton, updated on plan. Phone: [phone]  -Will need to switch to PO abx on discharge for lifetime suppression per ID recommendations  -Remains afebrile, normal WBC  -Consulted with Hospice per daughter's wishes yesterday. Awaiting their consultation and pt & daughter's wishes for continued care on discharge    Dementia:  -Continue home namenda.  -Lives in Memory Unit at [facility]    COPD on home O2:  -Not in exacerbation  -Continue breo and albuterol prn    History of hypothyroidism:  -TSH within normal limits  -Not currently on synthroid    Code: DNR  DVT Prophylaxis: Hep SQ  Dispo: PT/OT following. Will continue IV abx for a total of 10 days per ID recs.   Hospice has been consulted per daughter's request, but as we do not have plan set up yet, will continue plan as outlined above, until decision has been made.       Francisca Albarran MD

## 2017-12-08 NOTE — PROGRESS NOTES
Bedside report to Susan Roberts RN. Hourly rounds this shift. Poor intake, no pain or nausea reported. Family at bedside.

## 2017-12-08 NOTE — PROGRESS NOTES
Visit with patient to build rapport with . Patient is calm. Holding her bear Floresita Blade. Receptive to  presence. Encouraged and assured patient of our continued care. Gaby.       Carina Majano,  Staff   C: [phone]  /  [email or web address]

## 2017-12-08 NOTE — PROGRESS NOTES
Problem: Self Care Deficits Care Plan (Adult)  Goal: *Acute Goals and Plan of Care (Insert Text)  1. Patient will complete lower body bathing and dressing with modified independence and adaptive equipment as needed. 2. Patient will complete toileting with modified independence. 3. Patient will tolerate 25 minutes of OT treatment with minimal rest breaks to increase activity tolerance for ADLs. 4. Patient will complete functional transfers with modified independence and adaptive equipment as needed. Timeframe: 7 visits     OCCUPATIONAL THERAPY: Daily Note, Treatment Day: 1st and AM 12/8/2017  INPATIENT: Hospital Day: 10  [place] / Product Type: Medicare /      NAME/AGE/GENDER: Shaylee Nicole is a 80 y.o. female   PRIMARY DIAGNOSIS:  Acute diverticulitis of intestine  Abscess, abdomen ([address]  Colovesical fistula Acute diverticulitis of intestine Acute diverticulitis of intestine        ICD-10: Treatment Diagnosis:    · Generalized Muscle Weakness (M62.81)   Precautions/Allergies:     Review of patient's allergies indicates no known allergies. ASSESSMENT:   Ms. Lopez was admitted for the above diagnosis. Pt presents sitting up in chair upon arrival. Pt performed UE exercises (in grid below) to increase strength, coordination, and activity tolerance to perform mobility and ADLs. Pt followed commands well and did fair with exercises. Required a few rest breaks in between sets. Lunch came so session was cut short. Pt left with all needs in reach. Will continue to benefit from skilled OT during stay. This section established at most recent assessment   PROBLEM LIST (Impairments causing functional limitations):  1. Decreased Strength  2. Decreased ADL/Functional Activities  3. Decreased Balance  4. Decreased Activity Tolerance  5. Increased Fatigue  6.  Decreased Cognition   INTERVENTIONS PLANNED: (Benefits and precautions of occupational therapy have been discussed with the patient.)  1. Activities of daily living training  2. Adaptive equipment training  3. Balance training  4. Cognitive training  5. Donning&doffing training  6. Group therapy  7. Therapeutic activity  8. Therapeutic exercise     TREATMENT PLAN: Frequency/Duration: Follow patient 2x/week to address above goals. Rehabilitation Potential For Stated Goals: Good     RECOMMENDED REHABILITATION/EQUIPMENT: (at time of discharge pending progress): Due to the probability of continued deficits (see above) this patient will likely need continued skilled occupational therapy after discharge. Equipment:    to be determined              OCCUPATIONAL PROFILE AND HISTORY:   History of Present Injury/Illness (Reason for Referral):  See H&P  Past Medical History/Comorbidities:   Ms. Lopez  has a past medical history of COPD (chronic obstructive pulmonary disease) ([facility] [address] Depression; HLD (hyperlipidemia); Hypothyroidism; and Nocturnal hypoxia. Ms. Lopez  has a past surgical history that includes cholecystectomy. Social History/Living Environment:   Home Environment: Assisted living  One/Two Story Residence: One story  Living Alone: No  Support Systems: Child(cesar), Assisted living  Patient Expects to be Discharged to[de-identified] Assisted living  Current DME Used/Available at Home: Shower chair, Grab bars  Tub or Shower Type: Shower  Prior Level of Function/Work/Activity:  assisted     Number of Personal Factors/Comorbidities that affect the Plan of Care: Brief history (0):  LOW COMPLEXITY   ASSESSMENT OF OCCUPATIONAL PERFORMANCE[de-identified]   Activities of Daily Living:           Basic ADLs (From Assessment) Complex ADLs (From Assessment)   Basic ADL  Feeding: Independent  Oral Facial Hygiene/Grooming: Modified Independent  Bathing: Contact guard assistance  Upper Body Dressing: Setup  Lower Body Dressing: Minimum assistance  Toileting: Contact guard assistance Instrumental ADL  Meal Preparation: Total assistance  Homemaking:  Total assistance  Medication Management: Total assistance  Financial Management: Total assistance   Grooming/Bathing/Dressing Activities of Daily Living     Cognitive Retraining  Safety/Judgement: Decreased awareness of environment                       Bed/Mat Mobility  Rolling: Supervision  Supine to Sit: Minimum assistance  Sit to Stand: Minimum assistance  Bed to Chair: Contact guard assistance;Minimum assistance  Scooting: Supervision       Most Recent Physical Functioning:   Gross Assessment:                  Posture:  Posture (WDL): Within defined limits  Balance:  Sitting: Intact  Standing: Impaired  Standing - Static: Fair  Standing - Dynamic : Fair Bed Mobility:  Rolling: Supervision  Supine to Sit: Minimum assistance  Scooting: Supervision  Wheelchair Mobility:     Transfers:  Sit to Stand: Minimum assistance  Stand to Sit: Contact guard assistance  Bed to Chair: Contact guard assistance;Minimum assistance  Interventions: Verbal cues; Safety awareness training; Tactile cues  Duration: 17 Minutes                Patient Vitals for the past 6 hrs:   SpO2 O2 Flow Rate (L/min)   12/08/17 0724 94 % 2 l/min       Mental Status  Neurologic State: Alert, Confused  Orientation Level: Oriented to person  Cognition: Follows commands  Perception: Appears intact  Perseveration: No perseveration noted  Safety/Judgement: Decreased awareness of environment                          Physical Skills Involved:  1. Balance  2. Strength  3. Activity Tolerance Cognitive Skills Affected (resulting in the inability to perform in a timely and safe manner):  1. Perception  2. Executive Function  3. Short Term Recall  4. Long Term Memory  5. Sustained Attention  6. Divided Attention  7. Comprehension Psychosocial Skills Affected:  1. Habits/Routines  2. Environmental Adaptation  3. Self-Awareness  4. Awareness of Others  5.  Social Roles   Number of elements that affect the Plan of Care: 5+:  HIGH COMPLEXITY   CLINICAL DECISION MAKING: [address] [place] [id number] AM-PAC 6 Clicks   Daily Activity Inpatient Short Form  How much help from another person does the patient currently need. .. Total A Lot A Little None   1. Putting on and taking off regular lower body clothing? [] 1   [] 2   [x] 3   [] 4   2. Bathing (including washing, rinsing, drying)? [] 1   [] 2   [x] 3   [] 4   3. Toileting, which includes using toilet, bedpan or urinal?   [] 1   [] 2   [x] 3   [] 4   4. Putting on and taking off regular upper body clothing? [] 1   [] 2   [] 3   [x] 4   5. Taking care of personal grooming such as brushing teeth? [] 1   [] 2   [] 3   [x] 4   6. Eating meals? [] 1   [] 2   [] 3   [x] 4   © 2007, Trustees of [address] [place] [id number], under license to [employer] All rights reserved      Score:  Initial: 21 Most Recent: X (Date: -- )    Interpretation of Tool:  Represents activities that are increasingly more difficult (i.e. Bed mobility, Transfers, Gait). Score 24 23 22-20 19-15 14-10 9-7 6     Modifier CH CI CJ CK CL CM CN      ? Self Care:     - CURRENT STATUS: CJ - 20%-39% impaired, limited or restricted    - GOAL STATUS: CI - 1%-19% impaired, limited or restricted    - D/C STATUS:  ---------------To be determined---------------  [place] / Product Type: Medicare /      Medical Necessity:     · Patient demonstrates good rehab potential due to higher previous functional level. Reason for Services/Other Comments:  · Patient continues to require skilled intervention due to decreased ability to perform self care.    Use of outcome tool(s) and clinical judgement create a POC that gives a: LOW COMPLEXITY         TREATMENT:   (In addition to Assessment/Re-Assessment sessions the following treatments were rendered)     Pre-treatment Symptoms/Complaints:    Pain: Initial:   Pain Intensity 1: 0  Post Session:  0     Therapeutic Exercise: (12 minutes):  Exercises per grid below to improve mobility, strength, coordination and activity tolerance. Required minimal visual, verbal and tactile cues to promote proper body alignment and promote proper body mechanics. Progressed resistance and repetitions as indicated. UE Exercises (with yellow theraband and 1# dowel) Date:  12/8/2017 Date:   Date:     Activity/Exercise Parameters Parameters Parameters   Shoulder Abd/Adduction 15 reps (10 reps AROM) (5 reps with theraband)     Shoulder Flexion 2 sets 10 reps     Elbow Flexion      Punches  2 sets 10 reps                                 Braces/Orthotics/Lines/Etc:   · O2 Device: Room air  Treatment/Session Assessment:    · Response to Treatment:  Agrees to therapy  · Interdisciplinary Collaboration:   o Certified Occupational Therapy Assistant  o Registered Nurse  · After treatment position/precautions:   o Up in chair  o Bed alarm/tab alert on  o Bed/Chair-wheels locked  o Call light within reach  o Family at bedside   · Compliance with Program/Exercises: Will assess as treatment progresses. · Recommendations/Intent for next treatment session: \"Next visit will focus on advancements to more challenging activities and reduction in assistance provided\".   Total Treatment Duration:OT Patient Time In/Time Out  Time In: 6600  Time Out: [address] Letha Colon

## 2017-12-08 NOTE — PROGRESS NOTES
Problem: Mobility Impaired (Adult and Pediatric)  Goal: *Acute Goals and Plan of Care (Insert Text)  1. Ms. De Guzman will perform supine to sit and sit to supine independently in 5 days. 2.  Ms. De Guzman will perform sit to stand and bed to chair independently in 5 days. 3.  Ms. De Guzman will perform gait with rolling walker 300 ft independently in 5 days. PHYSICAL THERAPY: Daily Note, Treatment Day: 3rd, AM 12/8/2017  INPATIENT: Hospital Day: 10  [place] / Product Type: Medicare /      NAME/AGE/GENDER: Shaylee Nicole is a 80 y.o. female   PRIMARY DIAGNOSIS: Acute diverticulitis of intestine  Abscess, abdomen ([facility] [address]  Colovesical fistula Acute diverticulitis of intestine Acute diverticulitis of intestine        ICD-10: Treatment Diagnosis:   · Generalized Muscle Weakness (M62.81)  · Difficulty in walking, Not elsewhere classified (R26.2)   Precaution/Allergies:  Review of patient's allergies indicates no known allergies. ASSESSMENT:     Ms. Lopez is agreeable to therapy treatment this morning without complaints. She asks for assist with bed mobility and needs only min handheld assist to transfer to sitting and standing. Pt ambulates shorter gait distance today and seems to fatigue much quicker (also seems limited by mental status/motivation) but is very pleasant and takes a few short standing rest breaks. She needs CGA-brief min assist for safety during ambulation. Returned to room and up chair afterwards with chair alarm on and needs in reach. Shaylee Nicole appears to have refused therapies recently but is very pleasant and easy to encourage. Will continue with efforts to maximize strength and safety with mobility. This section established at most recent assessment   PROBLEM LIST (Impairments causing functional limitations):  1. Decreased Transfer Abilities  2. Decreased Ambulation Ability/Technique  3.  Decreased Activity Tolerance   INTERVENTIONS PLANNED: (Benefits and precautions of physical therapy have been discussed with the patient.)  1. Bed Mobility  2. Gait Training  3. Therapeutic Activites  4. Transfer Training  5. Group Therapy     TREATMENT PLAN: Frequency/Duration: 3 times a week for duration of hospital stay  Rehabilitation Potential For Stated Goals: Good     RECOMMENDED REHABILITATION/EQUIPMENT: (at time of discharge pending progress): Due to the probability of continued deficits (see above) this patient will likely need continued skilled physical therapy after discharge. Equipment:    None at this time              HISTORY:   History of Present Injury/Illness (Reason for Referral):  Guadalupe Ramirez is a 80 y.o. female who has a PMH of COPD on home oxygen, dementia, Hypothyroidism who was brought in by her daughter after she was diagnosed with acute diverticulitis and intraabdominal abscess with colovesical fistula. This is the second time of this episode, since 1 year ago she was admitted at [facility] for sepsis after an intraabdominal infection. She was not a candidate for surgery and received long-term atb via PICC line. Ms Carson Mora used to be on hospice due to her COPD and dementia, but was recently discharged and send to an snf. Has had episodes of UTI, with recent trial of keflex 1 week ago. Currently she complains of dysuria, foul-smell in urine, decreased appetite, confusion and multiple episodes of falls according to her daughter. Denies vomiting, fever, chills, abdominal pain, diarrhea, melena, hematochezia. Upon arrival to ER VS /63  HR 98  T97F  O2: 91% room air. Pertinent labs: wbc 13k, HB 12, normal chemistry, normal creatinine. Low albumin, high alk phosphatase. EKG NSR,unspecif st changes. Abdomen/pelvis CT scan: Acute sigmoid diverticulitis with 2 separate abscess collections. Associated colovesical fistula. Brain ct: negative for acute pathology.   Received cefepime/flagyl due to shortage of zosyn in the hospital. Lactic acid, ua pending. General surgeon informed and plan for no surgical procedure at the moment. Past Medical History/Comorbidities:   Ms. Cali Walls  has a past medical history of COPD (chronic obstructive pulmonary disease) ([address] Depression; HLD (hyperlipidemia); Hypothyroidism; and Nocturnal hypoxia. Ms. Cali Walls  has a past surgical history that includes cholecystectomy. Social History/Living Environment:   Home Environment: Assisted living  One/Two Story Residence: One story  Living Alone: No  Support Systems: Child(cesar), Assisted living  Patient Expects to be Discharged to[de-identified] Assisted living  Current DME Used/Available at Home: Shower chair, Grab bars  Tub or Shower Type: Shower  Prior Level of Function/Work/Activity:  Independent with walker in memory care unit. copd, dementia   Number of Personal Factors/Comorbidities that affect the Plan of Care: 1-2: MODERATE COMPLEXITY   EXAMINATION:   Most Recent Physical Functioning:   Gross Assessment:                  Posture:     Balance:  Sitting: Intact  Standing: Impaired  Standing - Static: Fair  Standing - Dynamic : Fair Bed Mobility:  Rolling: Supervision  Supine to Sit: Minimum assistance  Scooting: Supervision  Wheelchair Mobility:     Transfers:  Sit to Stand: Minimum assistance  Stand to Sit: Contact guard assistance  Bed to Chair: Contact guard assistance;Minimum assistance  Interventions: Verbal cues; Safety awareness training; Tactile cues  Duration: 17 Minutes  Gait:     Base of Support: Center of gravity altered;Narrowed  Step Length: Left shortened;Right shortened  Distance (ft): 150 Feet (ft)  Assistive Device: Walker, rolling  Ambulation - Level of Assistance: Contact guard assistance;Minimal assistance  Interventions: Verbal cues; Safety awareness training; Tactile cues      Body Structures Involved:  1. Muscles Body Functions Affected:  1. Movement Related Activities and Participation Affected:  1.  Mobility   Number of elements that affect the Plan of Care: 3: MODERATE COMPLEXITY   CLINICAL PRESENTATION:   Presentation: Evolving clinical presentation with changing clinical characteristics: MODERATE COMPLEXITY   CLINICAL DECISION MAKIN [facility] Mobility Inpatient Short Form  How much difficulty does the patient currently have. .. Unable A Lot A Little None   1. Turning over in bed (including adjusting bedclothes, sheets and blankets)? [] 1   [] 2   [x] 3   [] 4   2. Sitting down on and standing up from a chair with arms ( e.g., wheelchair, bedside commode, etc.)   [] 1   [] 2   [x] 3   [] 4   3. Moving from lying on back to sitting on the side of the bed? [] 1   [] 2   [x] 3   [] 4   How much help from another person does the patient currently need. .. Total A Lot A Little None   4. Moving to and from a bed to a chair (including a wheelchair)? [] 1   [] 2   [x] 3   [] 4   5. Need to walk in hospital room? [] 1   [] 2   [x] 3   [] 4   6. Climbing 3-5 steps with a railing? [] 1   [] 2   [x] 3   [] 4   © , Trustees of [address] [place] [address], under license to [employer]. All rights reserved      Score:  Initial: 18 Most Recent: X (Date: -- )    Interpretation of Tool:  Represents activities that are increasingly more difficult (i.e. Bed mobility, Transfers, Gait). Score 24 23 22-20 19-15 14-10 9-7 6     Modifier CH CI CJ CK CL CM CN      ? Mobility - Walking and Moving Around:     - CURRENT STATUS: CK - 40%-59% impaired, limited or restricted    - GOAL STATUS: CK - 40%-59% impaired, limited or restricted    - D/C STATUS:  ---------------To be determined---------------  [place] / Product Type: Medicare /      Medical Necessity:     · Patient is expected to demonstrate progress in functional technique to increase independence with mobility and gait. .  Reason for Services/Other Comments:  · Patient continues to require present interventions due to patient's inability to function at baseline. .   Use of outcome tool(s) and clinical judgement create a POC that gives a: Questionable prediction of patient's progress: MODERATE COMPLEXITY            TREATMENT:   (In addition to Assessment/Re-Assessment sessions the following treatments were rendered)   Pre-treatment Symptoms/Complaints:  \"I'm getting tired\"  Pain: Initial:   Pain Intensity 1: 0  Post Session:  0/10 no complaints     Therapeutic Activity: (  17 Minutes ):  Therapeutic activities including bed mobility, sit-stand transfers, Chair transfers and Ambulation on level ground to improve mobility, strength, balance and activity tolerance. Required minimal Verbal cues; Safety awareness training; Tactile cues to promote dynamic balance in standing and promote motor control of bilateral, lower extremity(s). Braces/Orthotics/Lines/Etc:   · none  Treatment/Session Assessment:    · Response to Treatment:  Slightly decreased activity tolerance, shorter gait distance today. Pleasant and fairly participatory. · Interdisciplinary Collaboration:   o Physical Therapist  o Registered Nurse  · After treatment position/precautions:   o Up in chair  o Bed alarm/tab alert on  o Bed in low position  o Call light within reach  o RN notified   · Compliance with Program/Exercises: Will assess as treatment progresses. · Recommendations/Intent for next treatment session: \"Next visit will focus on advancements to more challenging activities and reduction in assistance provided\".   Total Treatment Duration  PT Patient Time In/Time Out  Time In: 1046  Time Out: [address], DPT

## 2017-12-08 NOTE — INTERDISCIPLINARY ROUNDS
Interdisciplinary team rounds were held 12/8/2017 with the following team members:Care Management, Nursing, Pharmacy, Physical Therapy and Physician. Plan of care discussed. See clinical pathway and/or care plan for interventions and desired outcomes. Anticipating discharge to [facility] tomorrow.

## 2017-12-09 PROCEDURE — 74011000250 HC RX REV CODE- 250: Performed by: INTERNAL MEDICINE

## 2017-12-09 PROCEDURE — 94760 N-INVAS EAR/PLS OXIMETRY 1: CPT

## 2017-12-09 PROCEDURE — 74011250636 HC RX REV CODE- 250/636: Performed by: INTERNAL MEDICINE

## 2017-12-09 PROCEDURE — 65270000029 HC RM PRIVATE

## 2017-12-09 PROCEDURE — 74011250637 HC RX REV CODE- 250/637: Performed by: INTERNAL MEDICINE

## 2017-12-09 PROCEDURE — 74011250637 HC RX REV CODE- 250/637: Performed by: FAMILY MEDICINE

## 2017-12-09 PROCEDURE — 94640 AIRWAY INHALATION TREATMENT: CPT

## 2017-12-09 RX ORDER — CIPROFLOXACIN 500 MG/1
500 TABLET ORAL 2 TIMES DAILY
Qty: 60 TAB | Refills: 3 | Status: SHIPPED | OUTPATIENT
Start: 2017-12-09 | End: 2017-12-09

## 2017-12-09 RX ORDER — METRONIDAZOLE 500 MG/1
500 TABLET ORAL 3 TIMES DAILY
Qty: 90 TAB | Refills: 3 | Status: SHIPPED | OUTPATIENT
Start: 2017-12-09 | End: 2017-12-13

## 2017-12-09 RX ORDER — METRONIDAZOLE 500 MG/1
500 TABLET ORAL 3 TIMES DAILY
Status: DISCONTINUED | OUTPATIENT
Start: 2017-12-09 | End: 2017-12-11

## 2017-12-09 RX ORDER — CIPROFLOXACIN 500 MG/1
500 TABLET ORAL EVERY 12 HOURS
Status: DISCONTINUED | OUTPATIENT
Start: 2017-12-09 | End: 2017-12-13 | Stop reason: HOSPADM

## 2017-12-09 RX ORDER — METRONIDAZOLE 500 MG/1
500 TABLET ORAL 3 TIMES DAILY
Qty: 90 TAB | Refills: 3 | Status: SHIPPED | OUTPATIENT
Start: 2017-12-09 | End: 2017-12-09

## 2017-12-09 RX ORDER — CIPROFLOXACIN 500 MG/1
500 TABLET ORAL 2 TIMES DAILY
Qty: 60 TAB | Refills: 3 | Status: SHIPPED | OUTPATIENT
Start: 2017-12-09 | End: 2017-12-13

## 2017-12-09 RX ADMIN — HEPARIN SODIUM 5000 UNITS: 5000 INJECTION, SOLUTION INTRAVENOUS; SUBCUTANEOUS at 21:21

## 2017-12-09 RX ADMIN — CIPROFLOXACIN HYDROCHLORIDE 500 MG: 500 TABLET, FILM COATED ORAL at 14:05

## 2017-12-09 RX ADMIN — ALBUTEROL SULFATE 2.5 MG: 2.5 SOLUTION RESPIRATORY (INHALATION) at 01:50

## 2017-12-09 RX ADMIN — HALOPERIDOL LACTATE 4 MG: 5 INJECTION, SOLUTION INTRAMUSCULAR at 00:02

## 2017-12-09 RX ADMIN — Medication 1 AMPULE: at 09:00

## 2017-12-09 RX ADMIN — HEPARIN SODIUM 5000 UNITS: 5000 INJECTION, SOLUTION INTRAVENOUS; SUBCUTANEOUS at 09:31

## 2017-12-09 RX ADMIN — ALBUTEROL SULFATE 2.5 MG: 2.5 SOLUTION RESPIRATORY (INHALATION) at 19:20

## 2017-12-09 RX ADMIN — BUDESONIDE 500 MCG: 0.5 INHALANT RESPIRATORY (INHALATION) at 07:16

## 2017-12-09 RX ADMIN — MEMANTINE HYDROCHLORIDE 10 MG: 5 TABLET ORAL at 18:06

## 2017-12-09 RX ADMIN — MEMANTINE HYDROCHLORIDE 10 MG: 5 TABLET ORAL at 09:31

## 2017-12-09 RX ADMIN — ALBUTEROL SULFATE 2.5 MG: 2.5 SOLUTION RESPIRATORY (INHALATION) at 07:16

## 2017-12-09 RX ADMIN — METRONIDAZOLE 500 MG: 500 TABLET ORAL at 18:09

## 2017-12-09 RX ADMIN — Medication 1 AMPULE: at 21:21

## 2017-12-09 RX ADMIN — CIPROFLOXACIN HYDROCHLORIDE 500 MG: 500 TABLET, FILM COATED ORAL at 21:21

## 2017-12-09 RX ADMIN — METRONIDAZOLE 500 MG: 500 TABLET ORAL at 21:21

## 2017-12-09 RX ADMIN — ALBUTEROL SULFATE 2.5 MG: 2.5 SOLUTION RESPIRATORY (INHALATION) at 14:03

## 2017-12-09 RX ADMIN — BUDESONIDE 500 MCG: 0.5 INHALANT RESPIRATORY (INHALATION) at 19:20

## 2017-12-09 NOTE — PROGRESS NOTES
LMSW spoke with pt daughter Omega Gonzales at bedside today and also spoke with Sisi Headley [phone] at [place] [address]. Expressed concern about pt ability to tolerate a regular diet and feed herself at [facility] as they cannot feed pt per their level of care quide lines. Daughter has also expressed that she feels her Mother needs SNF rehab before she can function at her prior level of mobility necessary to manage at the [facility]. Will follow up with RN and MD about care concerns and proceed with supportive referrals as appropriate.

## 2017-12-09 NOTE — PROGRESS NOTES
Hourly rounds completed on patient. Patient denies any needs at this time. Will report to oncoming nurse.

## 2017-12-09 NOTE — PROGRESS NOTES
Pt. Ambulated to the bathroom with minimal assist. Sat in the chair for a couple of hours. Currently is eating her dinner independently. Family is at bedside.

## 2017-12-09 NOTE — DISCHARGE SUMMARY
Physician Discharge Summary       Patient: Ronny Chino MRN: [id number]  SSN: [id number]    YOB: 1933  Age: 80 y.o. Sex: female    PCP: Yelena Roberson MD    Admit date: 11/29/2017  Admitting Provider: Jessica Archuleta MD    Discharge date: 12/9/2017  Discharging Provider: Carmen Sexton MD    * Admission Diagnoses: Acute diverticulitis of intestine  Abscess, abdomen [facility])  Colovesical fistula    * Discharge Diagnoses:    Hospital Problems as of 12/9/2017  Date Reviewed: 11/29/2017          Codes Class Noted - Resolved POA    Colovesical fistula ICD-10-CM: N32.1  ICD-9-CM: 596.1  11/29/2017 - Present Yes        Abscess, abdomen ([facility] ICD-10-CM: K65.1  ICD-9-CM: 567.22  11/29/2017 - Present Yes        * (Principal)Acute diverticulitis of intestine ICD-10-CM: K57.92  ICD-9-CM: 562.11  11/29/2017 - Present Yes        Fall ICD-10-CM: W19. Myrl Mon  ICD-9-CM: E888.9  11/28/2017 - Present Yes        COPD (chronic obstructive pulmonary disease) ([facility] ICD-10-CM: J44.9  ICD-9-CM: 007  5/9/2017 - Present Yes        Hypothyroidism ICD-10-CM: E03.9  ICD-9-CM: 244.9  5/9/2017 - Present Yes        Dementia without behavioral disturbance ICD-10-CM: F03.90  ICD-9-CM: 294.20  5/9/2017 - Present Yes              * Hospital Course:   Ronny Chino is an 80yoF with PMHx of COPD on home oxygen, dementia, and hypothyroidism who was brought in by her daughter after she was diagnosed with acute diverticulitis and intraabdominal abscess with colovesical fistula. This is the second time of this condition, as 1 year ago she was admitted at [facility] for sepsis after an intraabdominal infection. She was not a candidate for surgery and received long-term atb via PICC line.   Ms. Cali Walls is living at an senior care, [facility] and was previously on hospice secondary to her COPD and dementia but did graduate from hospice and had been living at an [facility] until her presentation to the hospital.    On admission, the patient did have a constellation of symptoms that were consistent with her known diagnosis of colovesical fistula. She complained of dysuria, foul-smell in urine, decreased appetite, along with confusion and multiple episodes of falls according to her daughter. She was started on cefepime/flagyl due to shortage of zosyn in the hospital.    Given her condition and prognosis, family did decide against pursuing surgery. ID was consulted and recommended a total of 10 days of IV cefepime/flagyl with transition to PO cipro/flagyl indefinitely for hopeful lifetime suppression. Patient completed 10 days of IV cefepime and flagyl yesterday evening and will be discharge to [place] [address] for continued care today. During this stay, Hospice was consulted per daughter's [facility]) wishes. She will follow up with the hospice agency of her choice in the future, depending on her mother's clinical progression. * Procedures:   * No surgery found *      Consults: ID    Significant Diagnostic Studies:  Abdomen CT:  The lung bases are clear. There are no lesions in the liver or  spleen. The adrenal glands and pancreas appear normal.  There is normal  enhancement of the kidneys. There is no hydronephrosis. There is no adenopathy. There is mild diffuse prominence of stool pattern. There is no significant  small bowel distention. There are no inflammatory changes or fluid collections  in the abdomen.     Pelvis CT: There is inflammation of the sigmoid colon consistent with acute  diverticulitis. There is a complex air-fluid collection inferior and posterior  to the proximal sigmoid colon extending into the mesentery, at least 5 cm in  length. There is a second separate complex fluid collection between the mid  sigmoid colon and the bladder, 4 cm in diameter. This involves the wall of the  bladder. There is air in the bladder lumen suggesting a colovesical fistula. There are calcified fibroids in the uterus.   There is no significant pelvic  adenopathy. There are no bony lesions.     IMPRESSION  IMPRESSION: Acute sigmoid diverticulitis with 2 separate abscess collections. Associated colovesical fistula. Discharge Exam:  Visit Vitals    /73 (BP 1 Location: Right arm, BP Patient Position: Sitting)    Pulse 89    Temp 98.3 °F (36.8 °C)    Resp 19    Ht 5' 3\" (1.6 m)    Wt 63.5 kg (140 lb)    SpO2 94%    BMI 24.8 kg/m2     General appearance: alert, cooperative, no distress, appears stated age  Head: Normocephalic, without obvious abnormality, atraumatic  Lungs: clear to auscultation bilaterally  Heart: regular rate and rhythm, S1, S2 normal, no murmur, click, rub or gallop  Abdomen: Bowel sounds normal. No masses. Pt does wince on deep palpation of abdomen L>R    * Discharge Condition: stable  * Disposition: [place]    Discharge Medications:  Current Discharge Medication List      START taking these medications    Details   metroNIDAZOLE (FLAGYL) 500 mg tablet Take 1 Tab by mouth three (3) times daily. Qty: 90 Tab, Refills: 3      ciprofloxacin HCl (CIPRO) 500 mg tablet Take 1 Tab by mouth two (2) times a day. Qty: 60 Tab, Refills: 3         CONTINUE these medications which have NOT CHANGED    Details   docusate sodium (COLACE) 100 mg capsule Take 100 mg by mouth two (2) times daily as needed for Constipation. LORazepam (ATIVAN) 0.5 mg tablet Take 0.5 mg by mouth every six (6) hours as needed for Anxiety. DULoxetine 40 mg cpDR Take 40 mg by mouth daily. albuterol (PROVENTIL HFA) 90 mcg/actuation inhaler Take 2 Puffs by inhalation every four (4) hours as needed for Wheezing or Shortness of Breath. Benzocaine-Menthol (CHLORASEPTIC SORE THROAT) 6-10 mg lozg 1-2 Lozenges by Mucous Membrane route every four (4) hours as needed for Other (sore throat). Contracted Patient with [facility]. Using patient supply of home med.       memantine (NAMENDA) 10 mg tablet Take 10 mg by mouth two (2) times a day. krill-om-3-dha-epa-phospho-ast (MEGARED OMEGA-3 KRILL OIL) 564-44-62-50 mg cap Take 300 mg by mouth daily. ergocalciferol, vitamin D2, 2,000 unit tab Take 2,000 Units by mouth daily. fluticasone-vilanterol (BREO ELLIPTA) 200-25 mcg/dose inhaler Take 1 Puff by inhalation daily. ciclopirox (LOPROX) 0.77 % topical cream Apply 1 g to affected area two (2) times a day. To affected area bid. Contracted Patient with [facility]. Using patient supply of home med. STOP taking these medications       cephALEXin (KEFLEX) 500 mg capsule Comments:   Reason for Stopping:               * Follow-up Care/Patient Instructions:   Activity: Activity as tolerated  Diet: Regular Diet  Wound Care: None needed    Follow-up Information     Follow up With Details Comments [id number], MD   [address] [place] [address] [address]            Signed:  Dulce Owens MD  12/9/2017  10:09 AM

## 2017-12-10 PROCEDURE — 74011250636 HC RX REV CODE- 250/636: Performed by: INTERNAL MEDICINE

## 2017-12-10 PROCEDURE — 74011000250 HC RX REV CODE- 250: Performed by: INTERNAL MEDICINE

## 2017-12-10 PROCEDURE — 94640 AIRWAY INHALATION TREATMENT: CPT

## 2017-12-10 PROCEDURE — 74011250637 HC RX REV CODE- 250/637: Performed by: INTERNAL MEDICINE

## 2017-12-10 PROCEDURE — 74011250637 HC RX REV CODE- 250/637: Performed by: FAMILY MEDICINE

## 2017-12-10 PROCEDURE — 65270000029 HC RM PRIVATE

## 2017-12-10 PROCEDURE — 94760 N-INVAS EAR/PLS OXIMETRY 1: CPT

## 2017-12-10 RX ADMIN — ALBUTEROL SULFATE 2.5 MG: 2.5 SOLUTION RESPIRATORY (INHALATION) at 13:57

## 2017-12-10 RX ADMIN — ALBUTEROL SULFATE 2.5 MG: 2.5 SOLUTION RESPIRATORY (INHALATION) at 07:25

## 2017-12-10 RX ADMIN — CIPROFLOXACIN HYDROCHLORIDE 500 MG: 500 TABLET, FILM COATED ORAL at 09:53

## 2017-12-10 RX ADMIN — Medication 1 AMPULE: at 23:33

## 2017-12-10 RX ADMIN — HEPARIN SODIUM 5000 UNITS: 5000 INJECTION, SOLUTION INTRAVENOUS; SUBCUTANEOUS at 09:53

## 2017-12-10 RX ADMIN — MEMANTINE HYDROCHLORIDE 10 MG: 5 TABLET ORAL at 04:58

## 2017-12-10 RX ADMIN — MEMANTINE HYDROCHLORIDE 10 MG: 5 TABLET ORAL at 17:42

## 2017-12-10 RX ADMIN — BUDESONIDE 500 MCG: 0.5 INHALANT RESPIRATORY (INHALATION) at 20:31

## 2017-12-10 RX ADMIN — ALBUTEROL SULFATE 2.5 MG: 2.5 SOLUTION RESPIRATORY (INHALATION) at 20:34

## 2017-12-10 RX ADMIN — METRONIDAZOLE 500 MG: 500 TABLET ORAL at 09:53

## 2017-12-10 RX ADMIN — ALBUTEROL SULFATE 2.5 MG: 2.5 SOLUTION RESPIRATORY (INHALATION) at 01:16

## 2017-12-10 RX ADMIN — CIPROFLOXACIN HYDROCHLORIDE 500 MG: 500 TABLET, FILM COATED ORAL at 23:31

## 2017-12-10 RX ADMIN — BUDESONIDE 500 MCG: 0.5 INHALANT RESPIRATORY (INHALATION) at 07:25

## 2017-12-10 RX ADMIN — METRONIDAZOLE 500 MG: 500 TABLET ORAL at 17:42

## 2017-12-10 RX ADMIN — METRONIDAZOLE 500 MG: 500 TABLET ORAL at 23:31

## 2017-12-10 RX ADMIN — Medication 1 AMPULE: at 09:54

## 2017-12-10 RX ADMIN — HEPARIN SODIUM 5000 UNITS: 5000 INJECTION, SOLUTION INTRAVENOUS; SUBCUTANEOUS at 23:31

## 2017-12-10 NOTE — PROGRESS NOTES
Hospitalist Progress Note    Subjective:   Daily Progress Note: 12/10/2017 7:52 AM    Patient reports that she feels \"well\" this morning. Plan was to discharge the patient yesterday, but daughter expressed concern that she needs STR prior to returning to [facility]. SW is involved. Patient is agreeable to this plan. She has no complaints and is currently eating breakfast unassisted. Current Facility-Administered Medications   Medication Dose Route Frequency    ciprofloxacin HCl (CIPRO) tablet 500 mg  500 mg Oral Q12H    metroNIDAZOLE (FLAGYL) tablet 500 mg  500 mg Oral TID    alum-mag hydroxide-simeth (MYLANTA) oral suspension 30 mL  30 mL Oral Q4H PRN    haloperidol lactate (HALDOL) injection 4 mg  4 mg IntraMUSCular Q6H PRN    albuterol (PROVENTIL HFA, VENTOLIN HFA, PROAIR HFA) inhaler 2 Puff  2 Puff Inhalation Q4H PRN    memantine (NAMENDA) tablet 10 mg  10 mg Oral BID    heparin (porcine) injection 5,000 Units  5,000 Units SubCUTAneous Q12H    ondansetron (ZOFRAN) injection 4 mg  4 mg IntraVENous Q8H PRN    acetaminophen (TYLENOL) tablet 650 mg  650 mg Oral Q6H PRN    morphine injection 1 mg  1 mg IntraVENous Q4H PRN    budesonide (PULMICORT) 500 mcg/2 ml nebulizer suspension  500 mcg Nebulization BID RT    And    albuterol CONCENTRATE 2.5mg/0.5 mL neb soln  2.5 mg Nebulization Q6H RT    alcohol 62% (NOZIN) nasal  1 Ampule  1 Ampule Topical Q12H        Review of Systems  Pertinent items are noted in HPI.     Objective:     Visit Vitals    /66    Pulse 86    Temp 98.6 °F (37 °C)    Resp 17    Ht 5' 3\" (1.6 m)    Wt 63.5 kg (140 lb)    SpO2 (!) 89%    BMI 24.8 kg/m2    O2 Flow Rate (L/min): 2 l/min O2 Device: Room air    Temp (24hrs), Av.2 °F (36.8 °C), Min:97.6 °F (36.4 °C), Max:98.8 °F (37.1 °C)          1901 - 12/10 0700  In: 200 [P.O.:200]  Out: -     General appearance: alert, cooperative, no distress, appears stated age  Lungs: clear to auscultation bilaterally  Heart: regular rate and rhythm  Abdomen: soft, non-tender. Bowel sounds normal. No masses,  no organomegaly    Additional comments:None    Data Review    No results found for this or any previous visit (from the past 24 hour(s)). Assessment/Plan:     Principal Problem:    Acute diverticulitis of intestine (11/29/2017)    Active Problems:    COPD (chronic obstructive pulmonary disease) ([address] (5/9/2017)      Hypothyroidism (5/9/2017)      Dementia without behavioral disturbance (5/9/2017)      Fall (11/28/2017)      Colovesical fistula (11/29/2017)      Abscess, abdomen ([address] (11/29/2017)      Acute diverticulitis with colovesical fistula and abscess  -Continue PO cipro/flagyl for lifelong suppression  -Completed 10 days of IV cefipime/flagyl    Dementia  -Stable  -Able to eat unassisted  -Should be able to return to [facility]    Hypothyroidism  -Stable    Weakness  -Daughter reported concern of weakness yesterday after discharge as planned  -Daughter wishes to pursue STR [facility] Pending sale to [facility] working on this      DISPO:  Attempt to get STR for the patient prior to going back to [facility] per daughter's wishes. Care Plan discussed with: Patient    Total time spent with patient:10 minutes.     Signed By: Dulce Owens MD     December 10, 2017

## 2017-12-10 NOTE — PROGRESS NOTES
Daughter has a preference for SNF referrals to [facility] and [facility] for bed on Monday 12/10 if available.

## 2017-12-11 ENCOUNTER — APPOINTMENT (OUTPATIENT)
Dept: GENERAL RADIOLOGY | Age: 82
DRG: 392 | End: 2017-12-11
Attending: FAMILY MEDICINE
Payer: MEDICARE

## 2017-12-11 LAB
ATRIAL RATE: 123 BPM
CALCULATED P AXIS, ECG09: 81 DEGREES
CALCULATED R AXIS, ECG10: 43 DEGREES
CALCULATED T AXIS, ECG11: 71 DEGREES
DIAGNOSIS, 93000: NORMAL
P-R INTERVAL, ECG05: 142 MS
Q-T INTERVAL, ECG07: 308 MS
QRS DURATION, ECG06: 74 MS
QTC CALCULATION (BEZET), ECG08: 440 MS
VENTRICULAR RATE, ECG03: 123 BPM

## 2017-12-11 PROCEDURE — 77010033678 HC OXYGEN DAILY

## 2017-12-11 PROCEDURE — 97530 THERAPEUTIC ACTIVITIES: CPT

## 2017-12-11 PROCEDURE — 74011250636 HC RX REV CODE- 250/636: Performed by: INTERNAL MEDICINE

## 2017-12-11 PROCEDURE — 93005 ELECTROCARDIOGRAM TRACING: CPT | Performed by: FAMILY MEDICINE

## 2017-12-11 PROCEDURE — 71010 XR CHEST SNGL V: CPT

## 2017-12-11 PROCEDURE — 94640 AIRWAY INHALATION TREATMENT: CPT

## 2017-12-11 PROCEDURE — 74011000250 HC RX REV CODE- 250: Performed by: INTERNAL MEDICINE

## 2017-12-11 PROCEDURE — 94760 N-INVAS EAR/PLS OXIMETRY 1: CPT

## 2017-12-11 PROCEDURE — 74011250637 HC RX REV CODE- 250/637: Performed by: FAMILY MEDICINE

## 2017-12-11 PROCEDURE — 74011250636 HC RX REV CODE- 250/636: Performed by: FAMILY MEDICINE

## 2017-12-11 PROCEDURE — 97150 GROUP THERAPEUTIC PROCEDURES: CPT

## 2017-12-11 PROCEDURE — 65270000029 HC RM PRIVATE

## 2017-12-11 PROCEDURE — 74011250637 HC RX REV CODE- 250/637: Performed by: INTERNAL MEDICINE

## 2017-12-11 RX ORDER — METRONIDAZOLE 500 MG/1
500 TABLET ORAL EVERY 12 HOURS
Status: DISCONTINUED | OUTPATIENT
Start: 2017-12-12 | End: 2017-12-13 | Stop reason: HOSPADM

## 2017-12-11 RX ADMIN — SODIUM CHLORIDE 500 ML: 900 INJECTION, SOLUTION INTRAVENOUS at 01:44

## 2017-12-11 RX ADMIN — MEMANTINE HYDROCHLORIDE 10 MG: 5 TABLET ORAL at 19:33

## 2017-12-11 RX ADMIN — BUDESONIDE 500 MCG: 0.5 INHALANT RESPIRATORY (INHALATION) at 08:13

## 2017-12-11 RX ADMIN — Medication 1 AMPULE: at 10:41

## 2017-12-11 RX ADMIN — CIPROFLOXACIN HYDROCHLORIDE 500 MG: 500 TABLET, FILM COATED ORAL at 10:33

## 2017-12-11 RX ADMIN — METRONIDAZOLE 500 MG: 500 TABLET ORAL at 15:41

## 2017-12-11 RX ADMIN — ALBUTEROL SULFATE 2.5 MG: 2.5 SOLUTION RESPIRATORY (INHALATION) at 08:13

## 2017-12-11 RX ADMIN — ACETAMINOPHEN 650 MG: 325 TABLET ORAL at 21:13

## 2017-12-11 RX ADMIN — HEPARIN SODIUM 5000 UNITS: 5000 INJECTION, SOLUTION INTRAVENOUS; SUBCUTANEOUS at 10:41

## 2017-12-11 RX ADMIN — HEPARIN SODIUM 5000 UNITS: 5000 INJECTION, SOLUTION INTRAVENOUS; SUBCUTANEOUS at 21:13

## 2017-12-11 RX ADMIN — MEMANTINE HYDROCHLORIDE 10 MG: 5 TABLET ORAL at 10:33

## 2017-12-11 RX ADMIN — ALBUTEROL SULFATE 2.5 MG: 2.5 SOLUTION RESPIRATORY (INHALATION) at 21:30

## 2017-12-11 RX ADMIN — Medication 1 AMPULE: at 21:13

## 2017-12-11 RX ADMIN — CIPROFLOXACIN HYDROCHLORIDE 500 MG: 500 TABLET, FILM COATED ORAL at 21:13

## 2017-12-11 RX ADMIN — METRONIDAZOLE 500 MG: 500 TABLET ORAL at 10:33

## 2017-12-11 RX ADMIN — ALBUTEROL SULFATE 2.5 MG: 2.5 SOLUTION RESPIRATORY (INHALATION) at 14:58

## 2017-12-11 RX ADMIN — BUDESONIDE 500 MCG: 0.5 INHALANT RESPIRATORY (INHALATION) at 21:30

## 2017-12-11 NOTE — PROGRESS NOTES
Problem: Mobility Impaired (Adult and Pediatric)  Goal: *Acute Goals and Plan of Care (Insert Text)  1. Ms. De Guzman will perform supine to sit and sit to supine independently in 5 days. 2.  Ms. De Guzman will perform sit to stand and bed to chair independently in 5 days. 3.  Ms. De Guzman will perform gait with rolling walker 300 ft independently in 5 days. PHYSICAL THERAPY: Daily Note, Treatment Day: 4th, PM 12/11/2017  INPATIENT: Hospital Day: 13  [place] / Product Type: Medicare /      NAME/AGE/GENDER: Shaylee Nicole is a 80 y.o. female   PRIMARY DIAGNOSIS: Acute diverticulitis of intestine  Abscess, abdomen ([facility]  Colovesical fistula Acute diverticulitis of intestine Acute diverticulitis of intestine        ICD-10: Treatment Diagnosis:   · Generalized Muscle Weakness (M62.81)  · Difficulty in walking, Not elsewhere classified (R26.2)   Precaution/Allergies:  Review of patient's allergies indicates no known allergies. ASSESSMENT:     Ms. Ale Carvalho is agreeable to therapy treatment with encouragement. She sat to EOB with CGA. Stood to rolling walker and ambulated slowly ~100' with CGA and repeated VC's to ambulate closer to the walker, take longer steps and stand erect. She returned to chair in room then decided to get back in bed. Transferred to EOB, sat then returned supine with min assist for LE. After getting positioned she needed to go to BR. She sat up again and ambulated to BR. Voided and needed assist with hygiene. She ambulated back to EOB and again supine. Required VC's for most activities and all movements are slow. This section established at most recent assessment   PROBLEM LIST (Impairments causing functional limitations):  1. Decreased Transfer Abilities  2. Decreased Ambulation Ability/Technique  3. Decreased Activity Tolerance   INTERVENTIONS PLANNED: (Benefits and precautions of physical therapy have been discussed with the patient.)  1.  Bed Mobility  2. Gait Training  3. Therapeutic Activites  4. Transfer Training  5. Group Therapy     TREATMENT PLAN: Frequency/Duration: 3 times a week for duration of hospital stay  Rehabilitation Potential For Stated Goals: Good     RECOMMENDED REHABILITATION/EQUIPMENT: (at time of discharge pending progress): Due to the probability of continued deficits (see above) this patient will likely need continued skilled physical therapy after discharge. Equipment:    None at this time              HISTORY:   History of Present Injury/Illness (Reason for Referral):  Dayna Fernández is a 80 y.o. female who has a PMH of COPD on home oxygen, dementia, Hypothyroidism who was brought in by her daughter after she was diagnosed with acute diverticulitis and intraabdominal abscess with colovesical fistula. This is the second time of this episode, since 1 year ago she was admitted at [facility] for sepsis after an intraabdominal infection. She was not a candidate for surgery and received long-term atb via PICC line. Ms Sudhakar Hong used to be on hospice due to her COPD and dementia, but was recently discharged and send to an [facility]. Has had episodes of UTI, with recent trial of keflex 1 week ago. Currently she complains of dysuria, foul-smell in urine, decreased appetite, confusion and multiple episodes of falls according to her daughter. Denies vomiting, fever, chills, abdominal pain, diarrhea, melena, hematochezia. Upon arrival to ER VS /63  HR 98  T97F  O2: 91% room air. Pertinent labs: wbc 13k, HB 12, normal chemistry, normal creatinine. Low albumin, high alk phosphatase. EKG NSR,unspecif st changes. Abdomen/pelvis CT scan: Acute sigmoid diverticulitis with 2 separate abscess collections. Associated colovesical fistula. Brain ct: negative for acute pathology. Received cefepime/flagyl due to shortage of zosyn in the hospital. Lactic acid, ua pending.   General surgeon informed and plan for no surgical procedure at the moment. Past Medical History/Comorbidities:   Ms. Smith Sanchez  has a past medical history of COPD (chronic obstructive pulmonary disease) ([address] Depression; HLD (hyperlipidemia); Hypothyroidism; and Nocturnal hypoxia. Ms. Smith Sanchez  has a past surgical history that includes cholecystectomy. Social History/Living Environment:   Home Environment: Assisted living  One/Two Story Residence: One story  Living Alone: No  Support Systems: Child(cesar), Assisted living  Patient Expects to be Discharged to[de-identified] Assisted living  Current DME Used/Available at Home: Shower chair, Grab bars  Tub or Shower Type: Shower  Prior Level of Function/Work/Activity:  Independent with walker in memory care unit. copd, dementia   Number of Personal Factors/Comorbidities that affect the Plan of Care: 1-2: MODERATE COMPLEXITY   EXAMINATION:   Most Recent Physical Functioning:   Gross Assessment:                  Posture:     Balance:  Sitting: Impaired  Sitting - Static: Good (unsupported)  Sitting - Dynamic: Fair (occasional)  Standing: Impaired  Standing - Static: Fair  Standing - Dynamic : Fair Bed Mobility:  Supine to Sit: Contact guard assistance  Scooting: Contact guard assistance  Wheelchair Mobility:     Transfers:  Sit to Stand: Contact guard assistance  Stand to Sit: Contact guard assistance  Gait:     Base of Support: Center of gravity altered;Narrowed  Step Length: Left shortened;Right shortened  Distance (ft): 100 Feet (ft) (+ 10 x 2)  Assistive Device: Walker, rolling  Ambulation - Level of Assistance: Contact guard assistance  Interventions: Safety awareness training;Verbal cues      Body Structures Involved:  1. Muscles Body Functions Affected:  1. Movement Related Activities and Participation Affected:  1.  Mobility   Number of elements that affect the Plan of Care: 3: MODERATE COMPLEXITY   CLINICAL PRESENTATION:   Presentation: Evolving clinical presentation with changing clinical characteristics: MODERATE COMPLEXITY   CLINICAL DECISION MAKIN[address] [place] [id number] AM-PAC 6 Clicks   Basic Mobility Inpatient Short Form  How much difficulty does the patient currently have. .. Unable A Lot A Little None   1. Turning over in bed (including adjusting bedclothes, sheets and blankets)? [] 1   [] 2   [x] 3   [] 4   2. Sitting down on and standing up from a chair with arms ( e.g., wheelchair, bedside commode, etc.)   [] 1   [] 2   [x] 3   [] 4   3. Moving from lying on back to sitting on the side of the bed? [] 1   [] 2   [x] 3   [] 4   How much help from another person does the patient currently need. .. Total A Lot A Little None   4. Moving to and from a bed to a chair (including a wheelchair)? [] 1   [] 2   [x] 3   [] 4   5. Need to walk in hospital room? [] 1   [] 2   [x] 3   [] 4   6. Climbing 3-5 steps with a railing? [] 1   [] 2   [x] 3   [] 4   © 2007, Trustees of [address] [place] [id number], under license to [employer]. All rights reserved      Score:  Initial: 18 Most Recent: X (Date: -- )    Interpretation of Tool:  Represents activities that are increasingly more difficult (i.e. Bed mobility, Transfers, Gait). Score 24 23 22-20 19-15 14-10 9-7 6     Modifier CH CI CJ CK CL CM CN      ? Mobility - Walking and Moving Around:     - CURRENT STATUS: CK - 40%-59% impaired, limited or restricted    - GOAL STATUS: CK - 40%-59% impaired, limited or restricted    - D/C STATUS:  ---------------To be determined---------------  [place] / Product Type: Medicare /      Medical Necessity:     · Patient is expected to demonstrate progress in functional technique to increase independence with mobility and gait. .  Reason for Services/Other Comments:  · Patient continues to require present interventions due to patient's inability to function at baseline. .   Use of outcome tool(s) and clinical judgement create a POC that gives a: Questionable prediction of patient's progress: MODERATE COMPLEXITY            TREATMENT:   (In addition to Assessment/Re-Assessment sessions the following treatments were rendered)   Pre-treatment Symptoms/Complaints:  \"I'm getting dizzy. \"  Pain: Initial:     no complaints Post Session:  0/10 no complaints     Therapeutic Activity: (    ):  Therapeutic activities including bed mobility, sit-stand transfers, Chair transfers, toilet transfers and Ambulation on level ground to improve mobility, strength, balance and activity tolerance. Required minimal Safety awareness training;Verbal cues to promote dynamic balance in standing and promote motor control of bilateral, lower extremity(s). Braces/Orthotics/Lines/Etc:   · none  Treatment/Session Assessment:    · Response to Treatment:  Decreased activity tolerance. · Interdisciplinary Collaboration:   o Physical Therapy Assistant  o Registered Nurse  o Certified Nursing Assistant/Patient Care Technician  · After treatment position/precautions:   o Up in chair  o Bed alarm/tab alert on  o Bed in low position  o Call light within reach  o RN notified   · Compliance with Program/Exercises: Will assess as treatment progresses. · Recommendations/Intent for next treatment session: \"Next visit will focus on advancements to more challenging activities and reduction in assistance provided\".   Total Treatment Duration  PT Patient Time In/Time Out  Time In: 1327  Time Out: Rosy Shaffer. 93. DOMINIC Soto

## 2017-12-11 NOTE — PROGRESS NOTES
Pt has a heart rate of 130, doctor notified, ordered 500ml NS bolus and EKG. ECG results Sinus tachycardia with premature atrial complexes, low voltage QRS . Pt heart rate decreased to 103 after bolus and temp was 98. Hourly rounds completed during this shift and all pt needs met at this time.

## 2017-12-11 NOTE — INTERDISCIPLINARY ROUNDS
Interdisciplinary team rounds were held 12/11/2017 with the following team members:Care Management, Nursing, Pharmacy, Physical Therapy and Physician. Plan of care discussed. See clinical pathway and/or care plan for interventions and desired outcomes.

## 2017-12-11 NOTE — PROGRESS NOTES
Problem: Self Care Deficits Care Plan (Adult)  Goal: *Acute Goals and Plan of Care (Insert Text)  1. Patient will complete lower body bathing and dressing with modified independence and adaptive equipment as needed. 2. Patient will complete toileting with modified independence. 3. Patient will tolerate 25 minutes of OT treatment with minimal rest breaks to increase activity tolerance for ADLs. 4. Patient will complete functional transfers with modified independence and adaptive equipment as needed. Timeframe: 7 visits     OCCUPATIONAL THERAPY: Daily Note, Treatment Day: 2nd and AM 12/11/2017  INPATIENT: Hospital Day: 13  [place] / [place] / Product Type: Medicare /      NAME/AGE/GENDER: Angel iKng is a 80 y.o. female   PRIMARY DIAGNOSIS:  Acute diverticulitis of intestine  Abscess, abdomen ([address]  Colovesical fistula Acute diverticulitis of intestine Acute diverticulitis of intestine        ICD-10: Treatment Diagnosis:    · Generalized Muscle Weakness (M62.81)   Precautions/Allergies:     Review of patient's allergies indicates no known allergies. ASSESSMENT:   Ms. Courtney Anguiano was admitted for the above diagnosis. Pt presents attempting to get OOB without assistance. Pt required CGA/min assist to complete supine to sit transfer. Pt demonstrates fair sitting balance at edge of bed. Pt was able to transfer over to chair with HHA and CGA. Pt later was brought to therapy gym to participate in group exercises (in grid below) to increase strength and activity tolerance to perform mobility and ADLs. Pt did well following commands. Tolerated exercises well. Returned to room by recliner and lunch tray was placed in front of patient. Pt left with all needs in reach and daughter in room. Will continue to benefit from skilled OT during stay. This section established at most recent assessment   PROBLEM LIST (Impairments causing functional limitations):  1.  Decreased Strength  2. Decreased ADL/Functional Activities  3. Decreased Balance  4. Decreased Activity Tolerance  5. Increased Fatigue  6. Decreased Cognition   INTERVENTIONS PLANNED: (Benefits and precautions of occupational therapy have been discussed with the patient.)  1. Activities of daily living training  2. Adaptive equipment training  3. Balance training  4. Cognitive training  5. Donning&doffing training  6. Group therapy  7. Therapeutic activity  8. Therapeutic exercise     TREATMENT PLAN: Frequency/Duration: Follow patient 2x/week to address above goals. Rehabilitation Potential For Stated Goals: Good     RECOMMENDED REHABILITATION/EQUIPMENT: (at time of discharge pending progress): Due to the probability of continued deficits (see above) this patient will likely need continued skilled occupational therapy after discharge. Equipment:    to be determined              OCCUPATIONAL PROFILE AND HISTORY:   History of Present Injury/Illness (Reason for Referral):  See H&P  Past Medical History/Comorbidities:   Ms. Catrina Maya  has a past medical history of COPD (chronic obstructive pulmonary disease) ([facility] [address] Depression; HLD (hyperlipidemia); Hypothyroidism; and Nocturnal hypoxia. Ms. Catrina Maya  has a past surgical history that includes cholecystectomy.   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     Number of Personal Factors/Comorbidities that affect the Plan of Care: Brief history (0):  LOW COMPLEXITY   ASSESSMENT OF OCCUPATIONAL PERFORMANCE[de-identified]   Activities of Daily Living:           Basic ADLs (From Assessment) Complex ADLs (From Assessment)   Basic ADL  Feeding: Independent  Oral Facial Hygiene/Grooming: Modified Independent  Bathing: Contact guard assistance  Upper Body Dressing: Setup  Lower Body Dressing: Minimum assistance  Toileting: Contact guard assistance Instrumental ADL  Meal Preparation: Total assistance  Homemaking: Total assistance  Medication Management: Total assistance  Financial Management: Total assistance   Grooming/Bathing/Dressing Activities of Daily Living     Cognitive Retraining  Safety/Judgement: Decreased awareness of environment                       Bed/Mat Mobility  Supine to Sit: Contact guard assistance;Minimum assistance  Sit to Stand: Contact guard assistance;Minimum assistance  Bed to Chair: Contact guard assistance;Minimum assistance       Most Recent Physical Functioning:   Gross Assessment:                  Posture:  Posture (WDL): Within defined limits  Balance:  Sitting: Impaired  Sitting - Static: Good (unsupported)  Sitting - Dynamic: Fair (occasional)  Standing: Impaired  Standing - Static: Fair  Standing - Dynamic : Fair Bed Mobility:  Supine to Sit: Contact guard assistance;Minimum assistance  Wheelchair Mobility:     Transfers:  Sit to Stand: Contact guard assistance;Minimum assistance  Bed to Chair: Contact guard assistance;Minimum assistance                Patient Vitals for the past 6 hrs:   BP BP Patient Position SpO2 O2 Flow Rate (L/min) Pulse   12/11/17 0720 97/55 At rest 94 % 2 l/min 93   12/11/17 0813 - - 94 % 3 l/min -   12/11/17 1159 111/73 Sitting 92 % 3 l/min 83       Mental Status  Neurologic State: Alert, Confused  Orientation Level: Oriented to person  Cognition: Decreased command following  Perception: Appears intact  Perseveration: No perseveration noted  Safety/Judgement: Decreased awareness of environment                          Physical Skills Involved:  1. Balance  2. Strength  3. Activity Tolerance Cognitive Skills Affected (resulting in the inability to perform in a timely and safe manner):  1. Perception  2. Executive Function  3.  Short Term Recall  4. Long Term Memory  5. Sustained Attention  6. Divided Attention  7. Comprehension Psychosocial Skills Affected:  1. Habits/Routines  2. Environmental Adaptation  3. Self-Awareness  4. Awareness of Others  5. Social Roles   Number of elements that affect the Plan of Care: 5+:  HIGH COMPLEXITY   CLINICAL DECISION MAKIN[address] [place] [address] AM-PAC 6 Clicks   Daily Activity Inpatient Short Form  How much help from another person does the patient currently need. .. Total A Lot A Little None   1. Putting on and taking off regular lower body clothing? [] 1   [] 2   [x] 3   [] 4   2. Bathing (including washing, rinsing, drying)? [] 1   [] 2   [x] 3   [] 4   3. Toileting, which includes using toilet, bedpan or urinal?   [] 1   [] 2   [x] 3   [] 4   4. Putting on and taking off regular upper body clothing? [] 1   [] 2   [] 3   [x] 4   5. Taking care of personal grooming such as brushing teeth? [] 1   [] 2   [] 3   [x] 4   6. Eating meals? [] 1   [] 2   [] 3   [x] 4   © , Trustees of [address] [place] [address], under license to [employer]. All rights reserved      Score:  Initial: 21 Most Recent: X (Date: -- )    Interpretation of Tool:  Represents activities that are increasingly more difficult (i.e. Bed mobility, Transfers, Gait). Score 24 23 22-20 19-15 14-10 9-7 6     Modifier CH CI CJ CK CL CM CN      ? Self Care:     - CURRENT STATUS: CJ - 20%-39% impaired, limited or restricted    - GOAL STATUS: CI - 1%-19% impaired, limited or restricted    - D/C STATUS:  ---------------To be determined---------------  [place] / [place] / Product Type: Medicare /      Medical Necessity:     · Patient demonstrates good rehab potential due to higher previous functional level. Reason for Services/Other Comments:  · Patient continues to require skilled intervention due to decreased ability to perform self care.    Use of outcome tool(s) and clinical judgement create a POC that gives a: LOW COMPLEXITY         TREATMENT:   (In addition to Assessment/Re-Assessment sessions the following treatments were rendered)     Pre-treatment Symptoms/Complaints:    Pain: Initial:   Pain Intensity 1: 0  Post Session:  0     Therapeutic Activity: (13 minutes): Therapeutic activities including Bed transfers and Chair transfers to improve mobility, strength and balance. Required minimal   to promote static and dynamic balance in standing. Group Therapeutic Exercise: (10 minutes):  Exercises per grid below to improve mobility, strength, coordination and activity tolerance. Required minimal visual, verbal and tactile cues to promote proper body alignment and promote proper body mechanics. Progressed resistance and repetitions as indicated. UE Exercises (with yellow theraband and 1# dowel) Date:  12/8/2017 Date:  12/11/2017 Date:     Activity/Exercise Parameters Parameters Parameters   Shoulder Abd/Adduction 15 reps (10 reps AROM) (5 reps with theraband) 10 reps with yellow theraband    Shoulder Flexion 2 sets 10 reps 10 reps with 1# dowel    Elbow Flexion  15 reps with 1# dowel    Punches  2 sets 10 reps 10 reps with 1# dowel                          Braces/Orthotics/Lines/Etc:   · O2 Device: Room air  Treatment/Session Assessment:    · Response to Treatment: Tolerated well  · Interdisciplinary Collaboration:   o Certified Occupational Therapy Assistant  o Registered Nurse  · After treatment position/precautions:   o Up in chair  o Bed alarm/tab alert on  o Bed/Chair-wheels locked  o Call light within reach  o Family at bedside   · Compliance with Program/Exercises: Will assess as treatment progresses. · Recommendations/Intent for next treatment session: \"Next visit will focus on advancements to more challenging activities and reduction in assistance provided\".   Total Treatment Duration:OT Patient Time In/Time Out  Time In: 1100 (1135)  Time Out: 1113 (1145)     Angella PINTO Theodore Crow

## 2017-12-11 NOTE — PROGRESS NOTES
Hospitalist Progress Note    Subjective:   Daily Progress Note: 12/11/2017 12PM    Nereida Mccann is an 80yoF with PMHx of COPD on home O2, dementia, and hypothyroidism who presented with evidence of acute sigmoid diverticulitis and abscess. Patient lives at [facility] given her h/o dementia, and previously was on Hospice before surviving and going to her [facility]. Surgery and ID were consulted, and patient completed a 10 day course of IV cefipime/flagyl for her intra-abdominal infection, as patient's daughter (POA) declined pursuing surgery. ID recommends indefinite continuation of PO cipro/flagyl for hopeful lifetime suppression. Plan originally was to discharge the patient back to [facility] on Saturday after completion of IV abx, but daughter wishes to pursue STR, prior to her going back to [facility]. Therefore referral was made, and we are currently awaiting acceptance. Today, pt feels fine. Has no complaints. Daughter states that the pt sounds coarse on breathing and is concerned about her developing a pneumonia.     Current Facility-Administered Medications   Medication Dose Route Frequency    ciprofloxacin HCl (CIPRO) tablet 500 mg  500 mg Oral Q12H    metroNIDAZOLE (FLAGYL) tablet 500 mg  500 mg Oral TID    alum-mag hydroxide-simeth (MYLANTA) oral suspension 30 mL  30 mL Oral Q4H PRN    haloperidol lactate (HALDOL) injection 4 mg  4 mg IntraMUSCular Q6H PRN    albuterol (PROVENTIL HFA, VENTOLIN HFA, PROAIR HFA) inhaler 2 Puff  2 Puff Inhalation Q4H PRN    memantine (NAMENDA) tablet 10 mg  10 mg Oral BID    heparin (porcine) injection 5,000 Units  5,000 Units SubCUTAneous Q12H    ondansetron (ZOFRAN) injection 4 mg  4 mg IntraVENous Q8H PRN    acetaminophen (TYLENOL) tablet 650 mg  650 mg Oral Q6H PRN    morphine injection 1 mg  1 mg IntraVENous Q4H PRN    budesonide (PULMICORT) 500 mcg/2 ml nebulizer suspension  500 mcg Nebulization BID RT    And    albuterol CONCENTRATE 2.5mg/0.5 mL neb soln  2.5 mg Nebulization Q6H RT    alcohol 62% (NOZIN) nasal  1 Ampule  1 Ampule Topical Q12H        Review of Systems  Pertinent items are noted in HPI. Objective:     Visit Vitals    /73 (BP 1 Location: Right arm, BP Patient Position: Sitting)    Pulse 83    Temp 98 °F (36.7 °C)    Resp 16    Ht 5' 3\" (1.6 m)    Wt 63.5 kg (140 lb)    SpO2 92%    BMI 24.8 kg/m2    O2 Flow Rate (L/min): 3 l/min O2 Device: Nasal cannula    Temp (24hrs), Av.8 °F (37.1 °C), Min:98 °F (36.7 °C), Max:99.5 °F (37.5 °C)          1901 -  0700  In: 480 [P.O.:480]  Out: -     General appearance: alert, cooperative, no distress, appears stated age  Lungs: Diminished, but clear to auscultation bilaterally  Heart: regular rate and rhythm  Abdomen: soft, non-tender.  Bowel sounds normal. No masses,  no organomegaly    Additional comments:None    Data Review    Recent Results (from the past 24 hour(s))   EKG, 12 LEAD, INITIAL    Collection Time: 17  1:36 AM   Result Value Ref Range    Ventricular Rate 123 BPM    Atrial Rate 123 BPM    P-R Interval 142 ms    QRS Duration 74 ms    Q-T Interval 308 ms    QTC Calculation (Bezet) 440 ms    Calculated P Axis 81 degrees    Calculated R Axis 43 degrees    Calculated T Axis 71 degrees    Diagnosis       Sinus tachycardia with Premature atrial complexes  Low voltage QRS  Septal infarct (cited on or before 2017)  Abnormal ECG  When compared with ECG of 2017 19:23,  Premature atrial complexes are now Present  Confirmed by Julia Graham MD (), ALEX WALKER ([id number]) on 2017 6:44:31 AM           Assessment/Plan:     Principal Problem:    Acute diverticulitis of intestine (2017)    Active Problems:    COPD (chronic obstructive pulmonary disease) ([facility] [address] (2017)      Hypothyroidism (2017)      Dementia without behavioral disturbance (2017)      Fall (2017)      Colovesical fistula (2017)      Abscess, abdomen ([facility] [address] (11/29/2017)      Acute diverticulitis with colovesical fistula and abscess  -Continue PO cipro/flagyl for lifelong suppression  -Completed 10 days of IV cefipime/flagyl    Dementia  -Stable  -Able to eat unassisted  -Should be able to return to [facility] after STR    Hypothyroidism  -Stable    Weakness  -Daughter wishes to pursue STR prior to discharge back to [facility] include 234 beds at the [facility] working on this      DISPO:  Attempt to get STR for the patient prior to going back to [facility] per daughter's wishes. Care Plan discussed with: Patient & Daughter    Total time spent with patient:10 minutes.     Signed By: Oliver Jaramillo MD     December 11, 2017

## 2017-12-12 PROCEDURE — 94640 AIRWAY INHALATION TREATMENT: CPT

## 2017-12-12 PROCEDURE — 74011250636 HC RX REV CODE- 250/636: Performed by: INTERNAL MEDICINE

## 2017-12-12 PROCEDURE — 94760 N-INVAS EAR/PLS OXIMETRY 1: CPT

## 2017-12-12 PROCEDURE — 65270000029 HC RM PRIVATE

## 2017-12-12 PROCEDURE — 74011250637 HC RX REV CODE- 250/637: Performed by: FAMILY MEDICINE

## 2017-12-12 PROCEDURE — 74011250637 HC RX REV CODE- 250/637: Performed by: INTERNAL MEDICINE

## 2017-12-12 PROCEDURE — 74011000250 HC RX REV CODE- 250: Performed by: INTERNAL MEDICINE

## 2017-12-12 RX ADMIN — Medication 1 AMPULE: at 09:33

## 2017-12-12 RX ADMIN — CIPROFLOXACIN HYDROCHLORIDE 500 MG: 500 TABLET, FILM COATED ORAL at 09:32

## 2017-12-12 RX ADMIN — Medication 1 AMPULE: at 20:32

## 2017-12-12 RX ADMIN — ALBUTEROL SULFATE 2.5 MG: 2.5 SOLUTION RESPIRATORY (INHALATION) at 19:49

## 2017-12-12 RX ADMIN — BUDESONIDE 500 MCG: 0.5 INHALANT RESPIRATORY (INHALATION) at 19:49

## 2017-12-12 RX ADMIN — ALBUTEROL SULFATE 2.5 MG: 2.5 SOLUTION RESPIRATORY (INHALATION) at 01:59

## 2017-12-12 RX ADMIN — ALBUTEROL SULFATE 2.5 MG: 2.5 SOLUTION RESPIRATORY (INHALATION) at 07:48

## 2017-12-12 RX ADMIN — MEMANTINE HYDROCHLORIDE 10 MG: 5 TABLET ORAL at 09:32

## 2017-12-12 RX ADMIN — HEPARIN SODIUM 5000 UNITS: 5000 INJECTION, SOLUTION INTRAVENOUS; SUBCUTANEOUS at 20:32

## 2017-12-12 RX ADMIN — HALOPERIDOL LACTATE 4 MG: 5 INJECTION, SOLUTION INTRAMUSCULAR at 13:21

## 2017-12-12 RX ADMIN — HEPARIN SODIUM 5000 UNITS: 5000 INJECTION, SOLUTION INTRAVENOUS; SUBCUTANEOUS at 09:32

## 2017-12-12 RX ADMIN — METRONIDAZOLE 500 MG: 500 TABLET ORAL at 20:31

## 2017-12-12 RX ADMIN — CIPROFLOXACIN HYDROCHLORIDE 500 MG: 500 TABLET, FILM COATED ORAL at 20:31

## 2017-12-12 RX ADMIN — BUDESONIDE 500 MCG: 0.5 INHALANT RESPIRATORY (INHALATION) at 07:48

## 2017-12-12 RX ADMIN — MEMANTINE HYDROCHLORIDE 10 MG: 5 TABLET ORAL at 18:51

## 2017-12-12 NOTE — PROGRESS NOTES
Hourly rounds performed;all needs met at this time. Bed in low position and call light/ personal items within reach. Will continue to monitor and give bedside shift report to oncoming day shift nurse.

## 2017-12-12 NOTE — PROGRESS NOTES
Hourly rounds completed throughout this shift. Pt resting in bed; denies needs at this time. Will continue to monitor and report to oncoming night shift nurse.

## 2017-12-12 NOTE — PROGRESS NOTES
Hospitalist Progress Note    Subjective:   Daily Progress Note: 12/12/2017 12PM  Cali Walls is an 80yoF with PMHx of COPD on home O2, dementia, and hypothyroidism who presented with evidence of acute sigmoid diverticulitis and abscess. Patient lives at [facility] given her h/o dementia, and previously was on Hospice before surviving and going to her [facility]. Surgery and ID were consulted, and patient completed a 10 day course of IV cefipime/flagyl for her intra-abdominal infection, as patient's daughter (POA) declined pursuing surgery. ID recommends indefinite continuation of PO cipro/flagyl for hopeful lifetime suppression. Plan originally was to discharge the patient back to [facility] on Saturday after completion of IV abx, but daughter wishes to pursue STR, prior to her going back to [facility]. Therefore referral was made, and we are currently awaiting acceptance. This morning, she denied the following: abdominal pain, nausea or vomiting.     Current Facility-Administered Medications   Medication Dose Route Frequency    metroNIDAZOLE (FLAGYL) tablet 500 mg  500 mg Oral Q12H    ciprofloxacin HCl (CIPRO) tablet 500 mg  500 mg Oral Q12H    alum-mag hydroxide-simeth (MYLANTA) oral suspension 30 mL  30 mL Oral Q4H PRN    haloperidol lactate (HALDOL) injection 4 mg  4 mg IntraMUSCular Q6H PRN    albuterol (PROVENTIL HFA, VENTOLIN HFA, PROAIR HFA) inhaler 2 Puff  2 Puff Inhalation Q4H PRN    memantine (NAMENDA) tablet 10 mg  10 mg Oral BID    heparin (porcine) injection 5,000 Units  5,000 Units SubCUTAneous Q12H    ondansetron (ZOFRAN) injection 4 mg  4 mg IntraVENous Q8H PRN    acetaminophen (TYLENOL) tablet 650 mg  650 mg Oral Q6H PRN    morphine injection 1 mg  1 mg IntraVENous Q4H PRN    budesonide (PULMICORT) 500 mcg/2 ml nebulizer suspension  500 mcg Nebulization BID RT    And    albuterol CONCENTRATE 2.5mg/0.5 mL neb soln  2.5 mg Nebulization Q6H RT    alcohol 62% (NOZIN) nasal  1 Ampule  1 Ampule Topical Q12H        Review of Systems  Pertinent items are noted in HPI. Objective:     Visit Vitals    /71 (BP 1 Location: Right arm, BP Patient Position: At rest)    Pulse (!) 101    Temp 98.2 °F (36.8 °C)    Resp 18    Ht 5' 3\" (1.6 m)    Wt 63.5 kg (140 lb)    SpO2 93%    BMI 24.8 kg/m2    O2 Flow Rate (L/min): 3 l/min O2 Device: Room air    Temp (24hrs), Av °F (37.2 °C), Min:97.9 °F (36.6 °C), Max:101.5 °F (38.6 °C)      701 - 1900  In: 118 [P.O.:118]  Out: -   12/10 1901 -  07  In: 360 [P.O.:360]  Out: -     General appearance: alert, cooperative, no distress, appears stated age  Lungs: Diminished, but clear to auscultation bilaterally  Heart: regular rate and rhythm  Abdomen: soft, non-tender. Bowel sounds normal. No masses,  no organomegaly    Additional comments:None    Data Review    No results found for this or any previous visit (from the past 24 hour(s)). Assessment/Plan:     Principal Problem:    Acute diverticulitis of intestine (2017)    Active Problems:    COPD (chronic obstructive pulmonary disease) ([facility] [address] (2017)      Hypothyroidism (2017)      Dementia without behavioral disturbance (2017)      Fall (2017)      Colovesical fistula (2017)      Abscess, abdomen ([address] (2017)      Acute diverticulitis with colovesical fistula and abscess  -Continue PO cipro/flagyl for lifelong suppression  -Completed 10 days of IV cefipime/flagyl    Dementia  -Stable  -Able to eat unassisted  -Should be able to return to [facility] after STR    Hypothyroidism  -Stable    Weakness  -Daughter wishes to pursue STR prior to discharge back to [address] [place] [address] Rd:  Discharge likely today or tomorrow     Care Plan discussed with: Patient & Daughter    Total time spent with patient:10 minutes.     Signed By: Anabel Grey MD     2017

## 2017-12-12 NOTE — PROGRESS NOTES
Pt's daughter requested for pt to be given Haldol because pt was getting agitated. Haldol 4 mg given to pt as ordered PRN. Daughter at bed side.

## 2017-12-13 VITALS
HEIGHT: 63 IN | WEIGHT: 140 LBS | DIASTOLIC BLOOD PRESSURE: 62 MMHG | RESPIRATION RATE: 18 BRPM | SYSTOLIC BLOOD PRESSURE: 123 MMHG | HEART RATE: 104 BPM | TEMPERATURE: 98.5 F | OXYGEN SATURATION: 90 % | BODY MASS INDEX: 24.8 KG/M2

## 2017-12-13 LAB
ANION GAP SERPL CALC-SCNC: 6 MMOL/L (ref 7–16)
BUN SERPL-MCNC: 17 MG/DL (ref 8–23)
CALCIUM SERPL-MCNC: 9.2 MG/DL (ref 8.3–10.4)
CHLORIDE SERPL-SCNC: 106 MMOL/L (ref 98–107)
CO2 SERPL-SCNC: 31 MMOL/L (ref 21–32)
CREAT SERPL-MCNC: 0.65 MG/DL (ref 0.6–1)
GLUCOSE SERPL-MCNC: 109 MG/DL (ref 65–100)
POTASSIUM SERPL-SCNC: 4 MMOL/L (ref 3.5–5.1)
SODIUM SERPL-SCNC: 143 MMOL/L (ref 136–145)

## 2017-12-13 PROCEDURE — 74011250636 HC RX REV CODE- 250/636: Performed by: INTERNAL MEDICINE

## 2017-12-13 PROCEDURE — 80048 BASIC METABOLIC PNL TOTAL CA: CPT | Performed by: INTERNAL MEDICINE

## 2017-12-13 PROCEDURE — 36415 COLL VENOUS BLD VENIPUNCTURE: CPT | Performed by: INTERNAL MEDICINE

## 2017-12-13 PROCEDURE — 74011000250 HC RX REV CODE- 250: Performed by: INTERNAL MEDICINE

## 2017-12-13 PROCEDURE — 77010033678 HC OXYGEN DAILY

## 2017-12-13 PROCEDURE — 74011250637 HC RX REV CODE- 250/637: Performed by: FAMILY MEDICINE

## 2017-12-13 PROCEDURE — 74011250637 HC RX REV CODE- 250/637: Performed by: INTERNAL MEDICINE

## 2017-12-13 PROCEDURE — 94760 N-INVAS EAR/PLS OXIMETRY 1: CPT

## 2017-12-13 PROCEDURE — 94640 AIRWAY INHALATION TREATMENT: CPT

## 2017-12-13 RX ORDER — CIPROFLOXACIN 500 MG/1
500 TABLET ORAL EVERY 12 HOURS
Qty: 30 TAB | Refills: 0 | Status: SHIPPED | OUTPATIENT
Start: 2017-12-13

## 2017-12-13 RX ORDER — METRONIDAZOLE 500 MG/1
500 TABLET ORAL EVERY 12 HOURS
Qty: 30 TAB | Refills: 1 | Status: SHIPPED | OUTPATIENT
Start: 2017-12-13

## 2017-12-13 RX ADMIN — CIPROFLOXACIN HYDROCHLORIDE 500 MG: 500 TABLET, FILM COATED ORAL at 10:17

## 2017-12-13 RX ADMIN — MEMANTINE HYDROCHLORIDE 10 MG: 5 TABLET ORAL at 10:17

## 2017-12-13 RX ADMIN — Medication 1 AMPULE: at 10:18

## 2017-12-13 RX ADMIN — HEPARIN SODIUM 5000 UNITS: 5000 INJECTION, SOLUTION INTRAVENOUS; SUBCUTANEOUS at 10:18

## 2017-12-13 RX ADMIN — ALBUTEROL SULFATE 2.5 MG: 2.5 SOLUTION RESPIRATORY (INHALATION) at 07:17

## 2017-12-13 RX ADMIN — ALBUTEROL SULFATE 2.5 MG: 2.5 SOLUTION RESPIRATORY (INHALATION) at 01:54

## 2017-12-13 RX ADMIN — METRONIDAZOLE 500 MG: 500 TABLET ORAL at 10:17

## 2017-12-13 RX ADMIN — BUDESONIDE 500 MCG: 0.5 INHALANT RESPIRATORY (INHALATION) at 07:17

## 2017-12-13 NOTE — PROGRESS NOTES
Hourly rounds completed throughout this shift. Pt denies needs at this time. Will continue to monitor and report to oncoming day shift nurse.

## 2017-12-13 NOTE — PROGRESS NOTES
Hourly rounds completed throughout this shift. Pt slept most of the day. Pt resting in bed; denies needs at this time. Will continue to monitor and report to oncoming night shift nurse.

## 2017-12-13 NOTE — DISCHARGE SUMMARY
Hospitalist Discharge Summary     Admit Date:  2017  6:53 PM   Name:  Guadalupe Ramirez   Age:  80 y.o.  :  1933   MRN:  [id number]   PCP:  Orville Love MD  Treatment Team: Attending Provider: Tyrone Cook MD; Care Manager: Tyler Simon [facility]; Utilization Review: Everette Loss    Problem List for this Hospitalization:  Hospital Problems as of 2017  Date Reviewed: 2017          Codes Class Noted - Resolved POA    Colovesical fistula ICD-10-CM: N32.1  ICD-9-CM: 596.1  2017 - Present Yes        Abscess, abdomen ([facility] ICD-10-CM: K65.1  ICD-9-CM: 567.22  2017 - Present Yes        * (Principal)Acute diverticulitis of intestine ICD-10-CM: K57.92  ICD-9-CM: 562.11  2017 - Present Yes        Fall ICD-10-CM: W19. Leslye Rodriguez  ICD-9-CM: E888.9  2017 - Present Yes        COPD (chronic obstructive pulmonary disease) ([facility] ICD-10-CM: J44.9  ICD-9-CM: 629  2017 - Present Yes        Hypothyroidism ICD-10-CM: E03.9  ICD-9-CM: 244.9  2017 - Present Yes        Dementia without behavioral disturbance ICD-10-CM: F03.90  ICD-9-CM: 294.20  2017 - Present Yes                Admission HPI from 2017:    Rosalia Cruz is a 80 y.o. female who has a PMH of COPD on home oxygen, dementia, Hypothyroidism who was brought in by her daughter after she was diagnosed with acute diverticulitis and intraabdominal abscess with colovesical fistula. This is the second time of this episode, since 1 year ago she was admitted at [facility] for sepsis after an intraabdominal infection. She was not a candidate for surgery and received long-term atb via PICC line. Ms Carson Mora used to be on hospice due to her COPD and dementia, but was recently discharged and send to an [facility]. Has had episodes of UTI, with recent trial of keflex 1 week ago. Currently she complains of dysuria, foul-smell in urine, decreased appetite, confusion and multiple episodes of falls according to her daughter.  Denies vomiting, fever, chills, abdominal pain, diarrhea, melena, hematochezia. Upon arrival to ER VS /63  HR 98  T97F  O2: 91% room air. Pertinent labs: wbc 13k, HB 12, normal chemistry, normal creatinine. Low albumin, high alk phosphatase. EKG NSR,unspecif st changes. Abdomen/pelvis CT scan: Acute sigmoid diverticulitis with 2 separate abscess collections. Associated colovesical fistula. Brain ct: negative for acute pathology. Received cefepime/flagyl due to shortage of zosyn in the hospital. Lactic acid, ua pending. General surgeon informed and plan for no surgical procedure at the moment. \"    Hospital Course:      Derrick June is a 80 y.o. female who has a PMH of COPD on home oxygen, dementia, Hypothyroidism who was brought in by her daughter for increased dizziness and fatigue. Inpatient, abdomen/pelvis CT scan was consistent with acute sigmoid diverticulitis with 2 separate abscess collections (IR was unable to drain the two fluid collections because of location). Associated colovesical fistula. Brain ct: negative for acute pathology. She was diagnosed with acute diverticulitis and intra abdominal abscess x 2 with colovesical fistula. She received cefepime/flagyl due to shortage of zosyn in the hospital. Surgery and ID were consulted, and patient completed a 10 day course of IV cefipime/flagyl for her intra-abdominal infection, as patient's daughter (POA) declined pursuing surgery (general surgery would not offer resection with anastomosis due to baseline status and due to profound debility and prolonged recovery risk). She received 10 days of IV cefepime and metronidazole. ID recommended indefinite continuation of PO cipro/flagyl for hopeful lifetime suppression. She was discharged on 12/13/2017 with PO ciprofloxacin and metronidazole (will need lifetime suppression with those antibiotics) and the discharge summary was sent to the PCP.  She will follow up with her PCP within 1 week.       Follow up instructions below. Plan was discussed with the patient. All questions answered. Patient was stable at time of discharge and was instructed to call or return if there are any concerns or recurrence of symptoms. Diagnostic Imaging/Tests:   Xr Chest Sngl V    Result Date: 11/29/2017   Portable view of the chest COMPARISON: None. CLINICAL HISTORY: COPD. Shortness of breath FINDINGS: There is mild left basilar density, likely subsegmental atelectasis. No focal pulmonary consolidation, pleural effusion or pneumothorax. No pulmonary edema. Cardiac mediastinal contour is within normal limits. Bones are osteopenic. IMPRESSION: No focal pulmonary consolidation. Ct Head Wo Cont    Result Date: 11/29/2017  HEAD CT WITHOUT CONTRAST  11/29/2017 HISTORY:   Dementia. Increasingly frequent falls. Unsteady gait. TECHNIQUE: Noncontrast axial images were obtained through the brain. All CT scans at this facility used dose modulation, interactive reconstruction and/or weight based dosing when appropriate to reduce radiation dose to as low as reasonably achievable. COMPARISON: None FINDINGS: There is no acute intracranial hemorrhage, significant mass effect or CT evidence of acute large artery territorial infarction. Please note that a hyperacute infarct or small vessel infarct may not be apparent on initial CT imaging. Mild to moderate cerebral volume loss is present with compensatory ventricular enlargement. Extensive areas of low-attenuation are present in the supratentorial white matter. This pattern suggests chronic small vessel ischemic disease. There is no hydrocephalus , intra-axial mass or abnormal extra-axial fluid collection. There are no displaced skull fractures. The mastoid air cells and paranasal sinuses are clear where imaged. IMPRESSION: 1. No acute intracranial hemorrhage. 2. White matter findings compatible with advanced chronic small vessel ischemic disease.      Ct Abd Pelv W Cont    Result Date: 11/29/2017  CT of the Abdomen and Pelvis INDICATION:  History of abdominal abscess, abdominal pain Multiple axial images were obtained through the abdomen and pelvis after intravenous injection of 100mL of Isovue 370. Radiation dose reduction techniques were used for this study: All CT scans performed at this facility use one or all of the following: Automated exposure control, adjustment of the mA and/or kVp according to patient's size, iterative reconstruction. FINDINGS: Abdomen CT:  The lung bases are clear. There are no lesions in the liver or spleen. The adrenal glands and pancreas appear normal.  There is normal enhancement of the kidneys. There is no hydronephrosis. There is no adenopathy. There is mild diffuse prominence of stool pattern. There is no significant small bowel distention. There are no inflammatory changes or fluid collections in the abdomen. Pelvis CT: There is inflammation of the sigmoid colon consistent with acute diverticulitis. There is a complex air-fluid collection inferior and posterior to the proximal sigmoid colon extending into the mesentery, at least 5 cm in length. There is a second separate complex fluid collection between the mid sigmoid colon and the bladder, 4 cm in diameter. This involves the wall of the bladder. There is air in the bladder lumen suggesting a colovesical fistula. There are calcified fibroids in the uterus. There is no significant pelvic adenopathy. There are no bony lesions. IMPRESSION: Acute sigmoid diverticulitis with 2 separate abscess collections. Associated colovesical fistula. Echocardiogram results:  No results found for this visit on 11/29/17.       All Micro Results     Procedure Component Value Units Date/Time    CULTURE, BLOOD [[id number]]     Order Status:  Canceled Specimen:  Blood from Blood     CULTURE, BLOOD [[id number]]     Order Status:  Canceled Specimen:  Blood from Blood     CULTURE, URINE [[id number]] Collected: 12/09/17 2215    Order Status:  Canceled Specimen:  Urine from Clean catch     CULTURE, BLOOD [[id number]] Collected:  11/29/17 1935    Order Status:  Completed Specimen:  Blood from Blood Updated:  12/05/17 0614     Special Requests: --        RIGHT  Antecubital       Culture result: NO GROWTH 5 DAYS       CULTURE, BLOOD [[id number]] Collected:  11/29/17 1935    Order Status:  Completed Specimen:  Blood from Blood Updated:  12/05/17 0614     Special Requests: --        LEFT  Antecubital       Culture result: NO GROWTH 5 DAYS       CULTURE, URINE [[id number]]  (Abnormal) Collected:  11/29/17 2227    Order Status:  Completed Specimen:  Urine from Cath Urine Updated:  12/03/17 1326     Special Requests: NO SPECIAL REQUESTS        Culture result:         10,000 to 50,000 COLONIES/mL STREPTOCOCCUS VIRIDANS (A)              THIS ORGANISM WILL BE HELD FOR 7 DAYS. IF FURTHER TESTING IS REQUIRED PLEASE NOTIFY MICROBIOLOGY          Labs: Results:       BMP, Mg, Phos Recent Labs      12/13/17   0542   NA  143   K  4.0   CL  106   CO2  31   AGAP  6*   BUN  17   CREA  0.65   CA  9.2   GLU  109*      CBC No results for input(s): WBC, RBC, HGB, HCT, PLT, GRANS, LYMPH, EOS, MONOS, BASOS, IG, ANEU, ABL, TATYANA, ABM, ABB, AIG, HGBEXT, HCTEXT, PLTEXT in the last 72 hours. LFT No results for input(s): SGOT, ALT, TBIL, AP, TP, ALB, GLOB, AGRAT, GPT in the last 72 hours.    Cardiac Testing No results found for: BNPP, BNP, CPK, RCK1, RCK2, RCK3, RCK4, CKMB, CKNDX, CKND1, TROPT, TROIQ   Coagulation Tests No results found for: PTP, INR, APTT   A1c No results found for: HBA1C, HGBE8, FCW8WOSV   Lipid Panel No results found for: CHOL, CHOLPOCT, CHOLX, CHLST, CHOLV, 261119, HDL, LDL, LDLC, DLDLP, 060811, VLDLC, VLDL, TGLX, TRIGL, TRIGP, TGLPOCT, CHHD, [facility]   Thyroid Panel Lab Results   Component Value Date/Time    TSH 1.540 11/30/2017 05:33 AM    TSH 2.210 05/09/2017 04:38 PM        Most Recent UA Lab Results   Component Value Date/Time    Color YELLOW 12/05/2017 02:40 AM    Appearance CLOUDY 12/05/2017 02:40 AM    Specific gravity 1.004 12/05/2017 02:40 AM    pH (UA) 6.5 12/05/2017 02:40 AM    Protein NEGATIVE  12/05/2017 02:40 AM    Glucose NEGATIVE  12/05/2017 02:40 AM    Ketone NEGATIVE  12/05/2017 02:40 AM    Bilirubin NEGATIVE  12/05/2017 02:40 AM    Blood MODERATE 12/05/2017 02:40 AM    Urobilinogen 0.2 12/05/2017 02:40 AM    Nitrites NEGATIVE  12/05/2017 02:40 AM    Leukocyte Esterase MODERATE 12/05/2017 02:40 AM        No Known Allergies  Immunization History   Administered Date(s) Administered    TB Skin Test (PPD) Intradermal 11/29/2017       All Labs from Last 24 Hrs:  Recent Results (from the past 24 hour(s))   METABOLIC PANEL, BASIC    Collection Time: 12/13/17  5:42 AM   Result Value Ref Range    Sodium 143 136 - 145 mmol/L    Potassium 4.0 3.5 - 5.1 mmol/L    Chloride 106 98 - 107 mmol/L    CO2 31 21 - 32 mmol/L    Anion gap 6 (L) 7 - 16 mmol/L    Glucose 109 (H) 65 - 100 mg/dL    BUN 17 8 - 23 MG/DL    Creatinine 0.65 0.6 - 1.0 MG/DL    GFR est AA >60 >60 ml/min/1.73m2    GFR est non-AA >60 >60 ml/min/1.73m2    Calcium 9.2 8.3 - 10.4 MG/DL       Discharge Exam:  Patient Vitals for the past 24 hrs:   Temp Pulse Resp BP SpO2   12/13/17 0745 99.1 °F (37.3 °C) (!) 103 18 126/58 96 %   12/13/17 0719 - - - - (!) 87 %   12/13/17 0437 99.9 °F (37.7 °C) 93 18 118/60 95 %   12/13/17 0154 - - - - 97 %   12/12/17 2249 98.1 °F (36.7 °C) (!) 107 18 113/75 97 %   12/12/17 1949 - - - - 98 %   12/12/17 1945 98.7 °F (37.1 °C) (!) 104 18 137/72 98 %   12/12/17 1506 98.1 °F (36.7 °C) (!) 106 18 111/71 95 %   12/12/17 1104 98.2 °F (36.8 °C) (!) 101 18 110/71 93 %     Oxygen Therapy  O2 Sat (%): 96 % (12/13/17 0745)  Pulse via Oximetry: 90 beats per minute (12/13/17 0719)  O2 Device: Nasal cannula (12/13/17 0745)  O2 Flow Rate (L/min): 1.5 l/min (12/13/17 0745)  FIO2 (%): 28 % (12/08/17 0132)  No intake or output data in the 24 hours ending 12/13/17 1022 General:    Well nourished. Alert. No distress. Eyes:   Normal sclera. Extraocular movements intact. ENT:  Normocephalic, atraumatic. Moist mucous membranes  CV:   Regular rate and rhythm. No murmur, rub, or gallop. Lungs:  Clear to auscultation bilaterally. No wheezing, rhonchi, or rales. Abdomen: Soft, nontender, nondistended. Bowel sounds normal.   Extremities: Warm and dry. No cyanosis or edema. Neurologic: CN II-XII grossly intact. Sensation intact. Skin:     No rashes or jaundice. Psych:  Normal mood and affect. Oriented to name only. Discharge Info:   Current Discharge Medication List      START taking these medications    Details   ciprofloxacin HCl (CIPRO) 500 mg tablet Take 1 Tab by mouth every twelve (12) hours. Qty: 30 Tab, Refills: 0      metroNIDAZOLE (FLAGYL) 500 mg tablet Take 1 Tab by mouth every twelve (12) hours. Qty: 30 Tab, Refills: 1         CONTINUE these medications which have NOT CHANGED    Details   docusate sodium (COLACE) 100 mg capsule Take 100 mg by mouth two (2) times daily as needed for Constipation. LORazepam (ATIVAN) 0.5 mg tablet Take 0.5 mg by mouth every six (6) hours as needed for Anxiety. DULoxetine 40 mg cpDR Take 40 mg by mouth daily. albuterol (PROVENTIL HFA) 90 mcg/actuation inhaler Take 2 Puffs by inhalation every four (4) hours as needed for Wheezing or Shortness of Breath. Benzocaine-Menthol (CHLORASEPTIC SORE THROAT) 6-10 mg lozg 1-2 Lozenges by Mucous Membrane route every four (4) hours as needed for Other (sore throat). Contracted Patient with [facility]. Using patient supply of home med. memantine (NAMENDA) 10 mg tablet Take 10 mg by mouth two (2) times a day. krill-om-3-dha-epa-phospho-ast (MEGARED OMEGA-3 KRILL OIL) 328-89-67-50 mg cap Take 300 mg by mouth daily. ergocalciferol, vitamin D2, 2,000 unit tab Take 2,000 Units by mouth daily.       fluticasone-vilanterol (BREO ELLIPTA) 200-25 mcg/dose inhaler Take 1 Puff by inhalation daily. ciclopirox (LOPROX) 0.77 % topical cream Apply 1 g to affected area two (2) times a day. To affected area bid. Contracted Patient with [facility]. Using patient supply of home med. STOP taking these medications       cephALEXin (KEFLEX) 500 mg capsule Comments:   Reason for Stopping:                 Disposition: rehab    Activity: Activity as tolerated  Diet: DIET NUTRITIONAL SUPPLEMENTS All Meals; Ensure Enlive  DIET REGULAR    Follow-up Information     Follow up With Details Comments [address], MD   [address] [place] [address]  [phone]              Time spent in patient discharge planning and coordination 45 minutes.     Signed:  Mary Carmen Martin MD

## 2017-12-13 NOTE — PROGRESS NOTES
Hospitalist Progress Note    Subjective:   Daily Progress Note: 12/13/2017 12PM  John is an 80yoF with PMHx of COPD on home O2, dementia, and hypothyroidism who presented with evidence of acute sigmoid diverticulitis and abscess. Patient lives at [facility] given her h/o dementia, and previously was on Hospice before surviving and going to her [facility]. Surgery and ID were consulted, and patient completed a 10 day course of IV cefipime/flagyl for her intra-abdominal infection, as patient's daughter (POA) declined pursuing surgery. ID recommends indefinite continuation of PO cipro/flagyl for hopeful lifetime suppression. Plan originally was to discharge the patient back to [facility] on Saturday after completion of IV abx, but daughter wishes to pursue STR, prior to her going back to [facility]. This morning, she reported some abdominal pain but denies any dyspnea or chest pain.     Current Facility-Administered Medications   Medication Dose Route Frequency    metroNIDAZOLE (FLAGYL) tablet 500 mg  500 mg Oral Q12H    ciprofloxacin HCl (CIPRO) tablet 500 mg  500 mg Oral Q12H    alum-mag hydroxide-simeth (MYLANTA) oral suspension 30 mL  30 mL Oral Q4H PRN    haloperidol lactate (HALDOL) injection 4 mg  4 mg IntraMUSCular Q6H PRN    albuterol (PROVENTIL HFA, VENTOLIN HFA, PROAIR HFA) inhaler 2 Puff  2 Puff Inhalation Q4H PRN    memantine (NAMENDA) tablet 10 mg  10 mg Oral BID    heparin (porcine) injection 5,000 Units  5,000 Units SubCUTAneous Q12H    ondansetron (ZOFRAN) injection 4 mg  4 mg IntraVENous Q8H PRN    acetaminophen (TYLENOL) tablet 650 mg  650 mg Oral Q6H PRN    morphine injection 1 mg  1 mg IntraVENous Q4H PRN    budesonide (PULMICORT) 500 mcg/2 ml nebulizer suspension  500 mcg Nebulization BID RT    And    albuterol CONCENTRATE 2.5mg/0.5 mL neb soln  2.5 mg Nebulization Q6H RT    alcohol 62% (NOZIN) nasal  1 Ampule  1 Ampule Topical Q12H        Review of Systems  Pertinent items are noted in HPI. Objective:     Visit Vitals    /58 (BP 1 Location: Right arm, BP Patient Position: At rest)    Pulse (!) 103    Temp 99.1 °F (37.3 °C)    Resp 18    Ht 5' 3\" (1.6 m)    Wt 63.5 kg (140 lb)    SpO2 96%    BMI 24.8 kg/m2    O2 Flow Rate (L/min): 1.5 l/min O2 Device: Nasal cannula    Temp (24hrs), Av.7 °F (37.1 °C), Min:98.1 °F (36.7 °C), Max:99.9 °F (37.7 °C)          190 -  0700  In: 118 [P.O.:118]  Out: -     General appearance: alert, cooperative, no distress, appears stated age  Lungs: Diminished, but clear to auscultation bilaterally  Heart: regular rate and rhythm  Abdomen: soft, mild tenderness on palpation of epigastrium.  Bowel sounds normal. No masses,  no organomegaly    Additional comments:None    Data Review    Recent Results (from the past 24 hour(s))   METABOLIC PANEL, BASIC    Collection Time: 17  5:42 AM   Result Value Ref Range    Sodium 143 136 - 145 mmol/L    Potassium 4.0 3.5 - 5.1 mmol/L    Chloride 106 98 - 107 mmol/L    CO2 31 21 - 32 mmol/L    Anion gap 6 (L) 7 - 16 mmol/L    Glucose 109 (H) 65 - 100 mg/dL    BUN 17 8 - 23 MG/DL    Creatinine 0.65 0.6 - 1.0 MG/DL    GFR est AA >60 >60 ml/min/1.73m2    GFR est non-AA >60 >60 ml/min/1.73m2    Calcium 9.2 8.3 - 10.4 MG/DL         Assessment/Plan:     Principal Problem:    Acute diverticulitis of intestine (2017)    Active Problems:    COPD (chronic obstructive pulmonary disease) ([facility] (2017)      Hypothyroidism (2017)      Dementia without behavioral disturbance (2017)      Fall (2017)      Colovesical fistula (2017)      Abscess, abdomen ([facility] (2017)      Acute diverticulitis with colovesical fistula and abscess  -Continue PO cipro/flagyl for lifelong suppression  -Completed 10 days of IV cefipime/flagyl    Dementia  -Stable  -Able to eat unassisted  -Should be able to return to [facility] after STR    Hypothyroidism  -Stable    Weakness  -Daughter wishes to pursue STR prior to discharge back to [facility]        DISPO:  Discharge likely today    Care Plan discussed with: Patient & Daughter    Total time spent with patient:10 minutes.     Signed By: Carly Rahman MD     December 13, 2017

## 2017-12-14 ENCOUNTER — PATIENT OUTREACH (OUTPATIENT)
Dept: CASE MANAGEMENT | Age: 82
End: 2017-12-14

## 2017-12-14 NOTE — PROGRESS NOTES
Per [facility] patient discharged back to [facility] 12/13/2017, where patient resident and receives nursing care and services. No Transitions of Care Outreach due to patient discharge disposition. This note will not be viewable in [address]

## 2018-01-04 ENCOUNTER — HOSPITAL ENCOUNTER (EMERGENCY)
Age: 83
Discharge: HOME | End: 2018-01-04
Attending: EMERGENCY MEDICINE
Payer: MEDICARE

## 2018-01-04 ENCOUNTER — APPOINTMENT (OUTPATIENT)
Dept: GENERAL RADIOLOGY | Age: 83
End: 2018-01-04
Attending: EMERGENCY MEDICINE
Payer: MEDICARE

## 2018-01-04 ENCOUNTER — APPOINTMENT (OUTPATIENT)
Dept: CT IMAGING | Age: 83
End: 2018-01-04
Attending: EMERGENCY MEDICINE
Payer: MEDICARE

## 2018-01-04 VITALS
TEMPERATURE: 98.1 F | HEIGHT: 62 IN | SYSTOLIC BLOOD PRESSURE: 118 MMHG | BODY MASS INDEX: 23.55 KG/M2 | WEIGHT: 128 LBS | DIASTOLIC BLOOD PRESSURE: 66 MMHG | HEART RATE: 92 BPM | OXYGEN SATURATION: 98 % | RESPIRATION RATE: 16 BRPM

## 2018-01-04 DIAGNOSIS — S01.01XA LACERATION OF SCALP, INITIAL ENCOUNTER: ICD-10-CM
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
## 2018-01-04 DIAGNOSIS — K56.41 FECAL IMPACTION (HCC): ICD-10-CM

## 2018-01-04 LAB
ALBUMIN SERPL-MCNC: 2.6 G/DL (ref 3.2–4.6)
ALBUMIN/GLOB SERPL: 0.7 {RATIO} (ref 1.2–3.5)
ALP SERPL-CCNC: 67 U/L (ref 50–136)
ALT SERPL-CCNC: 8 U/L (ref 12–65)
ANION GAP SERPL CALC-SCNC: 7 MMOL/L (ref 7–16)
AST SERPL-CCNC: 16 U/L (ref 15–37)
ATRIAL RATE: 108 BPM
BASOPHILS # BLD: 0 K/UL (ref 0–0.2)
BASOPHILS NFR BLD: 0 % (ref 0–2)
BILIRUB SERPL-MCNC: 0.8 MG/DL (ref 0.2–1.1)
BUN SERPL-MCNC: 16 MG/DL (ref 8–23)
CALCIUM SERPL-MCNC: 9.2 MG/DL (ref 8.3–10.4)
CALCULATED P AXIS, ECG09: 76 DEGREES
CALCULATED R AXIS, ECG10: 57 DEGREES
CALCULATED T AXIS, ECG11: 65 DEGREES
CHLORIDE SERPL-SCNC: 103 MMOL/L (ref 98–107)
CO2 SERPL-SCNC: 30 MMOL/L (ref 21–32)
CREAT SERPL-MCNC: 0.73 MG/DL (ref 0.6–1)
DIAGNOSIS, 93000: NORMAL
DIFFERENTIAL METHOD BLD: ABNORMAL
EOSINOPHIL # BLD: 0 K/UL (ref 0–0.8)
EOSINOPHIL NFR BLD: 0 % (ref 0.5–7.8)
ERYTHROCYTE [DISTWIDTH] IN BLOOD BY AUTOMATED COUNT: 15 % (ref 11.9–14.6)
GLOBULIN SER CALC-MCNC: 3.8 G/DL (ref 2.3–3.5)
GLUCOSE SERPL-MCNC: 112 MG/DL (ref 65–100)
HCT VFR BLD AUTO: 36.6 % (ref 35.8–46.3)
HGB BLD-MCNC: 11.4 G/DL (ref 11.7–15.4)
IMM GRANULOCYTES # BLD: 0 K/UL (ref 0–0.5)
IMM GRANULOCYTES NFR BLD AUTO: 0 % (ref 0–5)
LIPASE SERPL-CCNC: 57 U/L (ref 73–393)
LYMPHOCYTES # BLD: 1.2 K/UL (ref 0.5–4.6)
LYMPHOCYTES NFR BLD: 13 % (ref 13–44)
MCH RBC QN AUTO: 28.6 PG (ref 26.1–32.9)
MCHC RBC AUTO-ENTMCNC: 31.1 G/DL (ref 31.4–35)
MCV RBC AUTO: 92 FL (ref 79.6–97.8)
MONOCYTES # BLD: 0.9 K/UL (ref 0.1–1.3)
MONOCYTES NFR BLD: 9 % (ref 4–12)
NEUTS SEG # BLD: 7.4 K/UL (ref 1.7–8.2)
NEUTS SEG NFR BLD: 78 % (ref 43–78)
P-R INTERVAL, ECG05: 148 MS
PLATELET # BLD AUTO: 230 K/UL (ref 150–450)
PMV BLD AUTO: 10 FL (ref 10.8–14.1)
POTASSIUM SERPL-SCNC: 3.9 MMOL/L (ref 3.5–5.1)
PROT SERPL-MCNC: 6.4 G/DL (ref 6.3–8.2)
Q-T INTERVAL, ECG07: 354 MS
QRS DURATION, ECG06: 76 MS
QTC CALCULATION (BEZET), ECG08: 465 MS
RBC # BLD AUTO: 3.98 M/UL (ref 4.05–5.25)
SODIUM SERPL-SCNC: 140 MMOL/L (ref 136–145)
VENTRICULAR RATE, ECG03: 104 BPM
WBC # BLD AUTO: 9.6 K/UL (ref 4.3–11.1)

## 2018-01-04 PROCEDURE — 99285 EMERGENCY DEPT VISIT HI MDM: CPT | Performed by: EMERGENCY MEDICINE

## 2018-01-04 PROCEDURE — 93005 ELECTROCARDIOGRAM TRACING: CPT | Performed by: EMERGENCY MEDICINE

## 2018-01-04 PROCEDURE — 80053 COMPREHEN METABOLIC PANEL: CPT | Performed by: EMERGENCY MEDICINE

## 2018-01-04 PROCEDURE — 70450 CT HEAD/BRAIN W/O DYE: CPT

## 2018-01-04 PROCEDURE — 83690 ASSAY OF LIPASE: CPT | Performed by: EMERGENCY MEDICINE

## 2018-01-04 PROCEDURE — 85025 COMPLETE CBC W/AUTO DIFF WBC: CPT | Performed by: EMERGENCY MEDICINE

## 2018-01-04 PROCEDURE — 74019 RADEX ABDOMEN 2 VIEWS: CPT

## 2018-01-04 RX ORDER — FLUTICASONE PROPIONATE AND SALMETEROL 250; 50 UG/1; UG/1
1 POWDER RESPIRATORY (INHALATION) 2 TIMES DAILY
COMMUNITY

## 2018-01-04 NOTE — ED NOTES
TRANSFER - OUT REPORT:    Verbal report given to Jero Coyne on Saman Cameron  being returned to [facility] for routine progression of care       Report consisted of patients Situation, Background, Assessment and   Recommendations(SBAR). Information from the following report(s) ED Summary and Recent Results was reviewed with the receiving nurse. Lines:   Peripheral IV 01/04/18 Right Antecubital (Active)   Site Assessment Clean, dry, & intact 1/4/2018 12:17 PM   Phlebitis Assessment 0 1/4/2018 12:17 PM   Infiltration Assessment 0 1/4/2018 12:17 PM        Opportunity for questions and clarification was provided. Pt left via POV with her daughter driving.

## 2018-01-04 NOTE — ED PROVIDER NOTES
HPI Comments: Patient is an 80-year-old female had an assisted-living facility presenting after fall earlier today. Patient has advanced dementia, COPD and was recently hospitalized for diverticulitis, intra-abdominal abscess as well as colovesical fistula. She is currently on antibiotics Cipro and Flagyl. History of recurrent urinary tract infections. Daughter is bedside and states they actually have a hospice meeting scheduled. Daughter who is the power of  states that intra-abdominal abscesses are nonoperative in nature and they're not requesting any further treatment of this problem. Patient has some scalp leading to her right posterior scalp. Denies any loss consciousness. Daughter states patient has been falling more frequently since discharge from hospital last month. No fevers, use of blood thinners, vomiting or increased shortness of breath.patient is a very poor historian secondary to dementia. Most history taken via daughter who is bedside. The history is provided by the patient. No  was used. Past Medical History:   Diagnosis Date    COPD (chronic obstructive pulmonary disease) ([facility] [address]     Dementia     Depression     HLD (hyperlipidemia)     Hypothyroidism     Nocturnal hypoxia        Past Surgical History:   Procedure Laterality Date    HX CHOLECYSTECTOMY           Family History:   Problem Relation Age of Onset    Hypertension Mother     Heart Disease Father     Cancer Sister     Dementia Sister     Thyroid Disease Sister     Dementia Brother        Social History     Social History    Marital status:      Spouse name: N/A    Number of children: N/A    Years of education: N/A     Occupational History    Not on file.      Social History Main Topics    Smoking status: Former Smoker     Packs/day: 1.00     Years: 50.00    Smokeless tobacco: Never Used    Alcohol use 2.4 oz/week     4 Glasses of wine per week    Drug use: No    Sexual activity: Not Currently     Partners: Male     Other Topics Concern    Not on file     Social History Narrative         ALLERGIES: Review of patient's allergies indicates no known allergies. Review of Systems   Unable to perform ROS: Dementia       Vitals:    01/04/18 1100   BP: 102/57   Resp: 18   Temp: 98.3 °F (36.8 °C)   SpO2: 95%   Weight: 58.1 kg (128 lb)   Height: 5' 2\" (1.575 m)            Physical Exam   Constitutional: She appears well-developed and well-nourished. No distress. HENT:   Head: Normocephalic. Eyes: Conjunctivae and EOM are normal. Pupils are equal, round, and reactive to light. Neck: Normal range of motion. Neck supple. Cardiovascular: Normal rate. Pulmonary/Chest: Effort normal. No respiratory distress. Abdominal: Soft. There is no tenderness. Genitourinary:   Genitourinary Comments: Rectal examination demonstrated a stool ball digital exam.  Fecal disimpaction performed with large volume stool ball retrieved. Musculoskeletal: Normal range of motion. She exhibits no edema. Neurological: She is alert. Patient at baseline per daughter. Skin: Skin is warm. No rash noted. Psychiatric: She has a normal mood and affect. Vitals reviewed. MDM  Number of Diagnoses or Management Options  Fall, initial encounter: new and does not require workup  Fecal impaction [facility]): new and does not require workup  Laceration of scalp, initial encounter: new and does not require workup  Diagnosis management comments: CT head negative. There is a small black/abrasion of the right aspect of her forehead. It is very small and shallow. Discussed staples with daughter who states she would not like this at this time. It is very small and should come together fine. Fecal disimpaction performed. Patient feels improved. We'll discharge to home. Return precautions discussed.        Amount and/or Complexity of Data Reviewed  Clinical lab tests: ordered and reviewed (Results for orders placed or performed during the hospital encounter of 01/04/18  -CBC WITH AUTOMATED DIFF       Result                                            Value                         Ref Range                       WBC                                               9.6                           4.3 - 11.1 K/uL                 RBC                                               3.98 (L)                      4.05 - 5.25 M/uL                HGB                                               11.4 (L)                      11.7 - 15.4 g/dL                HCT                                               36.6                          35.8 - 46.3 %                   MCV                                               92.0                          79.6 - 97.8 FL                  MCH                                               28.6                          26.1 - 32.9 PG                  MCHC                                              31.1 (L)                      31.4 - 35.0 g/dL                RDW                                               15.0 (H)                      11.9 - 14.6 %                   PLATELET                                          230                           150 - 450 K/uL                  MPV                                               10.0 (L)                      10.8 - 14.1 FL                  DF                                                AUTOMATED                                                     NEUTROPHILS                                       78                            43 - 78 %                       LYMPHOCYTES                                       13                            13 - 44 %                       MONOCYTES                                         9                             4.0 - 12.0 %                    EOSINOPHILS                                       0 (L)                         0.5 - 7.8 %                     BASOPHILS                                         0 0.0 - 2.0 %                     IMMATURE GRANULOCYTES                             0                             0.0 - 5.0 %                     ABS. NEUTROPHILS                                  7.4                           1.7 - 8.2 K/UL                  ABS. LYMPHOCYTES                                  1.2                           0.5 - 4.6 K/UL                  ABS. MONOCYTES                                    0.9                           0.1 - 1.3 K/UL                  ABS. EOSINOPHILS                                  0.0                           0.0 - 0.8 K/UL                  ABS. BASOPHILS                                    0.0                           0.0 - 0.2 K/UL                  ABS. IMM.  GRANS.                                  0.0                           0.0 - 0.5 K/UL             -METABOLIC PANEL, COMPREHENSIVE       Result                                            Value                         Ref Range                       Sodium                                            140                           136 - 145 mmol/L                Potassium                                         3.9                           3.5 - 5.1 mmol/L                Chloride                                          103                           98 - 107 mmol/L                 CO2                                               30                            21 - 32 mmol/L                  Anion gap                                         7                             7 - 16 mmol/L                   Glucose                                           112 (H)                       65 - 100 mg/dL                  BUN                                               16                            8 - 23 MG/DL                    Creatinine                                        0.73                          0.6 - 1.0 MG/DL                 GFR est AA                                        >60 >60 ml/min/1.73m2               GFR est non-AA                                    >60                           >60 ml/min/1.73m2               Calcium                                           9.2                           8.3 - 10.4 MG/DL                Bilirubin, total                                  0.8                           0.2 - 1.1 MG/DL                 ALT (SGPT)                                        8 (L)                         12 - 65 U/L                     AST (SGOT)                                        16                            15 - 37 U/L                     Alk. phosphatase                                  67                            50 - 136 U/L                    Protein, total                                    6.4                           6.3 - 8.2 g/dL                  Albumin                                           2.6 (L)                       3.2 - 4.6 g/dL                  Globulin                                          3.8 (H)                       2.3 - 3.5 g/dL                  A-G Ratio                                         0.7 (L)                       1.2 - 3.5                  -LIPASE       Result                                            Value                         Ref Range                       Lipase                                            57 (L)                        73 - 393 U/L               -EKG, 12 LEAD, INITIAL       Result                                            Value                         Ref Range                       Ventricular Rate                                  104                           BPM                             Atrial Rate                                       108                           BPM                             P-R Interval                                      148                           ms                              QRS Duration                                      76 ms                              Q-T Interval                                      354                           ms                              QTC Calculation (Bezet)                           465                           ms                              Calculated P Axis                                 76                            degrees                         Calculated R Axis                                 57                            degrees                         Calculated T Axis                                 65                            degrees                         Diagnosis                                                                                                   Sinus tachycardia with Premature atrial complexes and consecutive pacs   Septal infarct , age undetermined   Abnormal ECG   When compared with ECG of 17-NOV-2014 22:11,   Premature atrial complexes are now Present   Questionable change in QRS axis   Confirmed by Ale Campbell MD (), DOMINICK ANTOINE ([id number]) on 1/4/2018 12:56:58 PM     )  Tests in the radiology section of CPT®: ordered and reviewed (Xr Abd (ap And Erect Or Decub)    Result Date: 1/4/2018  Abdominal radiograph dated 1/4/2018 History: Fecal impaction. Comparison: None. Findings: Flat and upright views of the abdomen are submitted. The visualized bowel gas pattern is non-specific. Dense stool is seen throughout the colon most prominently in the descending colon. No dilated loops of small bowel are seen. On the upright  view, no air fluid levels are seen. No evidence for free intraperitoneal air is seen. A calcified structure is seen in the right pelvis likely representing a calcified fibroid. IMPRESSION: 1. Nonspecific bowel gas pattern. Dense stool is seen throughout the colon most prominently in the descending colon. 2.  No free intraperitoneal air.      Ct Head Wo Cont    Result Date: 1/4/2018  CT HEAD WITHOUT CONTRAST, 1/4/2018 History: Fall with altered mental status. Comparison: CT head without contrast 11/29/2017 Technique:   5 mm axial scans from the skull base to the vertex. All CT scans performed at this facility use one or all of the following: Automated exposure control, adjustment of the mA and/or kVp according to patient's size, iterative reconstruction. Findings:  No evidence of intracranial hemorrhage is seen. No abnormal extra-axial fluid collections are seen. No evidence for acute hydrocephalus is seen. No evidence of midline shift or herniation is seen. No abnormal edema pattern is seen in a vascular distribution to suggest large artery infarction. Chronic appearing parenchymal changes are seen which are not felt to be significantly changed. Evaluation with bone windows shows no acute osseous changes. The visualized sinuses, middle ears, and mastoid air cells are well aerated. IMPRESSION:  1. No acute intracranial process evident by noncontrast CT study of the head.     )  Review and summarize past medical records: yes  Independent visualization of images, tracings, or specimens: yes    Risk of Complications, Morbidity, and/or Mortality  Presenting problems: moderate  Diagnostic procedures: moderate  Management options: moderate    Patient Progress  Patient progress: improved    ED Course   Comment By Time   Disimpaction performed with a large stool ball retrieved. enema was unsuccessful. Patient tolerated well without any acute issue.  Finn Robb MD 01/04 1850       Procedures

## 2018-01-04 NOTE — ED TRIAGE NOTES
Pt here via EMS after a fall this morning. Abrasion to the right side of her scalp. Pt was hospitalized at [facility] and continues to be treated for a UTI. Pt is in the memory care unit at [facility]. Gerri Terri when trying to stand up this morning. Daughter states that pt has numerous falls since her hospitalization. Also, might have a fecal impaction.

## 2018-01-04 NOTE — ED NOTES
I have reviewed discharge instructions with the patient. The patient verbalized understanding. Patient left ED via Discharge Method: wheelchair to [facility]. Opportunity for questions and clarification provided. Patient given 0 scripts. To continue your aftercare when you leave the hospital, you may receive an automated call from our care team to check in on how you are doing. This is a free service and part of our promise to provide the best care and service to meet your aftercare needs.  If you have questions, or wish to unsubscribe from this service please call [phone]. Thank you for Choosing our [facility] Emergency Department.

## 2018-01-04 NOTE — DISCHARGE INSTRUCTIONS
